# Patient Record
Sex: MALE | Race: WHITE | Employment: OTHER | URBAN - METROPOLITAN AREA
[De-identification: names, ages, dates, MRNs, and addresses within clinical notes are randomized per-mention and may not be internally consistent; named-entity substitution may affect disease eponyms.]

---

## 2018-04-23 ENCOUNTER — OFFICE VISIT (OUTPATIENT)
Dept: GERIATRIC MEDICINE | Age: 78
End: 2018-04-23
Payer: MEDICARE

## 2018-04-23 VITALS
SYSTOLIC BLOOD PRESSURE: 110 MMHG | WEIGHT: 153 LBS | HEART RATE: 68 BPM | TEMPERATURE: 98 F | BODY MASS INDEX: 24.59 KG/M2 | DIASTOLIC BLOOD PRESSURE: 78 MMHG | HEIGHT: 66 IN | RESPIRATION RATE: 16 BRPM

## 2018-04-23 DIAGNOSIS — G31.84 MCI (MILD COGNITIVE IMPAIRMENT): Primary | ICD-10-CM

## 2018-04-23 PROCEDURE — 99212 OFFICE O/P EST SF 10 MIN: CPT | Performed by: INTERNAL MEDICINE

## 2018-04-23 PROCEDURE — G8427 DOCREV CUR MEDS BY ELIG CLIN: HCPCS | Performed by: INTERNAL MEDICINE

## 2018-04-23 PROCEDURE — 1123F ACP DISCUSS/DSCN MKR DOCD: CPT | Performed by: INTERNAL MEDICINE

## 2018-04-23 PROCEDURE — 1036F TOBACCO NON-USER: CPT | Performed by: INTERNAL MEDICINE

## 2018-04-23 PROCEDURE — 4040F PNEUMOC VAC/ADMIN/RCVD: CPT | Performed by: INTERNAL MEDICINE

## 2018-04-23 PROCEDURE — 99211 OFF/OP EST MAY X REQ PHY/QHP: CPT

## 2018-04-23 PROCEDURE — G8598 ASA/ANTIPLAT THER USED: HCPCS | Performed by: INTERNAL MEDICINE

## 2018-04-23 PROCEDURE — G8420 CALC BMI NORM PARAMETERS: HCPCS | Performed by: INTERNAL MEDICINE

## 2018-04-23 RX ORDER — CYANOCOBALAMIN 1000 UG/ML
1000 INJECTION INTRAMUSCULAR; SUBCUTANEOUS
COMMUNITY
End: 2021-01-01

## 2018-04-23 RX ORDER — AMOXICILLIN 500 MG/1
2000 CAPSULE ORAL
Status: ON HOLD | COMMUNITY
End: 2019-04-04 | Stop reason: HOSPADM

## 2018-04-23 RX ORDER — WARFARIN SODIUM 5 MG/1
TABLET ORAL
COMMUNITY
End: 2020-09-09 | Stop reason: SDUPTHER

## 2018-10-15 ENCOUNTER — HOSPITAL ENCOUNTER (OUTPATIENT)
Age: 78
Discharge: HOME | End: 2018-10-17
Payer: MEDICARE

## 2018-10-15 LAB — PROSTATE SPECIFIC ANTIGEN: 5.88 NG/ML (ref 0–4)

## 2018-10-15 PROCEDURE — 84153 ASSAY OF PSA TOTAL: CPT

## 2018-10-23 ENCOUNTER — OFFICE VISIT (OUTPATIENT)
Dept: GERIATRIC MEDICINE | Age: 78
End: 2018-10-23
Payer: MEDICARE

## 2018-10-23 VITALS
WEIGHT: 151.3 LBS | HEART RATE: 72 BPM | SYSTOLIC BLOOD PRESSURE: 132 MMHG | HEIGHT: 66 IN | TEMPERATURE: 98 F | DIASTOLIC BLOOD PRESSURE: 76 MMHG | BODY MASS INDEX: 24.32 KG/M2 | RESPIRATION RATE: 16 BRPM

## 2018-10-23 DIAGNOSIS — R41.0 CONFUSION: Primary | ICD-10-CM

## 2018-10-23 PROCEDURE — G8598 ASA/ANTIPLAT THER USED: HCPCS | Performed by: INTERNAL MEDICINE

## 2018-10-23 PROCEDURE — G8427 DOCREV CUR MEDS BY ELIG CLIN: HCPCS | Performed by: INTERNAL MEDICINE

## 2018-10-23 PROCEDURE — 1036F TOBACCO NON-USER: CPT | Performed by: INTERNAL MEDICINE

## 2018-10-23 PROCEDURE — 99211 OFF/OP EST MAY X REQ PHY/QHP: CPT | Performed by: INTERNAL MEDICINE

## 2018-10-23 PROCEDURE — G8420 CALC BMI NORM PARAMETERS: HCPCS | Performed by: INTERNAL MEDICINE

## 2018-10-23 PROCEDURE — 99212 OFFICE O/P EST SF 10 MIN: CPT | Performed by: INTERNAL MEDICINE

## 2018-10-23 PROCEDURE — 1123F ACP DISCUSS/DSCN MKR DOCD: CPT | Performed by: INTERNAL MEDICINE

## 2018-10-23 PROCEDURE — G8484 FLU IMMUNIZE NO ADMIN: HCPCS | Performed by: INTERNAL MEDICINE

## 2018-10-23 PROCEDURE — 1101F PT FALLS ASSESS-DOCD LE1/YR: CPT | Performed by: INTERNAL MEDICINE

## 2018-10-23 PROCEDURE — 4040F PNEUMOC VAC/ADMIN/RCVD: CPT | Performed by: INTERNAL MEDICINE

## 2018-10-23 RX ORDER — LISINOPRIL 10 MG/1
5 TABLET ORAL DAILY
COMMUNITY
End: 2019-04-01 | Stop reason: ALTCHOICE

## 2018-10-23 ASSESSMENT — PATIENT HEALTH QUESTIONNAIRE - PHQ9
SUM OF ALL RESPONSES TO PHQ QUESTIONS 1-9: 0
2. FEELING DOWN, DEPRESSED OR HOPELESS: 0
1. LITTLE INTEREST OR PLEASURE IN DOING THINGS: 0
SUM OF ALL RESPONSES TO PHQ9 QUESTIONS 1 & 2: 0
SUM OF ALL RESPONSES TO PHQ QUESTIONS 1-9: 0

## 2018-10-23 NOTE — PROGRESS NOTES
Alert and may be doing worse acc to wife   And [place] worse  And NO DRIVING at this time  Reading lips  Wife doing all IADL's  ADL's independent  Sleeps poorly due to nightmares  Watches TV but cannot hear and poor focus   1/3 3 point fix and knew October   President ? ?    Minicog 0/3 and clock 4/4 and animals 12 and cube  As well as pentagons good   No hearing aids and will not use them   But unable to know year or age   More repeating   Social  [facility] [employer]   Impressions : Nightmares from [place]                           Worsening clinically with [facility]                            Question                           Very [place]   Plan: B12 level           Continue Aricept 20 mg and move dosage to AM           And Namenda 20 mg a day

## 2018-10-31 DIAGNOSIS — R41.0 CONFUSION: ICD-10-CM

## 2019-04-01 ENCOUNTER — APPOINTMENT (OUTPATIENT)
Dept: GENERAL RADIOLOGY | Age: 79
DRG: 535 | End: 2019-04-01
Payer: MEDICARE

## 2019-04-01 ENCOUNTER — APPOINTMENT (OUTPATIENT)
Dept: ULTRASOUND IMAGING | Age: 79
DRG: 535 | End: 2019-04-01
Payer: MEDICARE

## 2019-04-01 ENCOUNTER — APPOINTMENT (OUTPATIENT)
Dept: CT IMAGING | Age: 79
DRG: 535 | End: 2019-04-01
Payer: MEDICARE

## 2019-04-01 ENCOUNTER — HOSPITAL ENCOUNTER (INPATIENT)
Age: 79
LOS: 3 days | Discharge: NURSING, REHAB OR LONG-TERM CARE | DRG: 535 | End: 2019-04-04
Attending: EMERGENCY MEDICINE | Admitting: INTERNAL MEDICINE
Payer: MEDICARE

## 2019-04-01 DIAGNOSIS — R09.02 HYPOXIA: Primary | ICD-10-CM

## 2019-04-01 DIAGNOSIS — J96.01 ACUTE RESPIRATORY FAILURE WITH HYPOXIA (HCC): ICD-10-CM

## 2019-04-01 LAB
ALBUMIN SERPL-MCNC: 3.6 G/DL (ref 3.5–5.2)
ALP BLD-CCNC: 67 U/L (ref 40–129)
ALT SERPL-CCNC: 20 U/L (ref 0–40)
ANION GAP SERPL CALCULATED.3IONS-SCNC: 10 MMOL/L (ref 7–16)
AST SERPL-CCNC: 24 U/L (ref 0–39)
BILIRUB SERPL-MCNC: 0.4 MG/DL (ref 0–1.2)
BILIRUBIN URINE: NEGATIVE
BLOOD, URINE: NEGATIVE
BUN BLDV-MCNC: 19 MG/DL (ref 8–23)
CALCIUM SERPL-MCNC: 8.9 MG/DL (ref 8.6–10.2)
CHLORIDE BLD-SCNC: 102 MMOL/L (ref 98–107)
CLARITY: CLEAR
CO2: 27 MMOL/L (ref 22–29)
COLOR: YELLOW
CREAT SERPL-MCNC: 1 MG/DL (ref 0.7–1.2)
GFR AFRICAN AMERICAN: >60
GFR NON-AFRICAN AMERICAN: >60 ML/MIN/1.73
GLUCOSE BLD-MCNC: 145 MG/DL (ref 74–99)
GLUCOSE URINE: NEGATIVE MG/DL
HCT VFR BLD CALC: 39.6 % (ref 37–54)
HEMOGLOBIN: 12.7 G/DL (ref 12.5–16.5)
INR BLD: 2.5
KETONES, URINE: NEGATIVE MG/DL
LACTIC ACID: 2.1 MMOL/L (ref 0.5–2.2)
LEUKOCYTE ESTERASE, URINE: NEGATIVE
MCH RBC QN AUTO: 31.5 PG (ref 26–35)
MCHC RBC AUTO-ENTMCNC: 32.1 % (ref 32–34.5)
MCV RBC AUTO: 98.3 FL (ref 80–99.9)
NITRITE, URINE: NEGATIVE
PDW BLD-RTO: 14.4 FL (ref 11.5–15)
PH UA: 6 (ref 5–9)
PLATELET # BLD: 182 E9/L (ref 130–450)
PMV BLD AUTO: 9.8 FL (ref 7–12)
POTASSIUM SERPL-SCNC: 4.3 MMOL/L (ref 3.5–5)
PRO-BNP: 676 PG/ML (ref 0–450)
PROTEIN UA: NEGATIVE MG/DL
PROTHROMBIN TIME: 28.1 SEC (ref 9.3–12.4)
RBC # BLD: 4.03 E12/L (ref 3.8–5.8)
SODIUM BLD-SCNC: 139 MMOL/L (ref 132–146)
SPECIFIC GRAVITY UA: 1.01 (ref 1–1.03)
TOTAL PROTEIN: 6.3 G/DL (ref 6.4–8.3)
TROPONIN: 0.01 NG/ML (ref 0–0.03)
UROBILINOGEN, URINE: 1 E.U./DL
WBC # BLD: 7.1 E9/L (ref 4.5–11.5)

## 2019-04-01 PROCEDURE — 36415 COLL VENOUS BLD VENIPUNCTURE: CPT

## 2019-04-01 PROCEDURE — 93005 ELECTROCARDIOGRAM TRACING: CPT | Performed by: NURSE PRACTITIONER

## 2019-04-01 PROCEDURE — 2500000003 HC RX 250 WO HCPCS: Performed by: NURSE PRACTITIONER

## 2019-04-01 PROCEDURE — 70450 CT HEAD/BRAIN W/O DYE: CPT

## 2019-04-01 PROCEDURE — 83605 ASSAY OF LACTIC ACID: CPT

## 2019-04-01 PROCEDURE — 71250 CT THORAX DX C-: CPT

## 2019-04-01 PROCEDURE — 6370000000 HC RX 637 (ALT 250 FOR IP): Performed by: NURSE PRACTITIONER

## 2019-04-01 PROCEDURE — 2700000000 HC OXYGEN THERAPY PER DAY

## 2019-04-01 PROCEDURE — 2060000000 HC ICU INTERMEDIATE R&B

## 2019-04-01 PROCEDURE — 84484 ASSAY OF TROPONIN QUANT: CPT

## 2019-04-01 PROCEDURE — 6360000002 HC RX W HCPCS: Performed by: EMERGENCY MEDICINE

## 2019-04-01 PROCEDURE — 94150 VITAL CAPACITY TEST: CPT

## 2019-04-01 PROCEDURE — 71046 X-RAY EXAM CHEST 2 VIEWS: CPT

## 2019-04-01 PROCEDURE — 85610 PROTHROMBIN TIME: CPT

## 2019-04-01 PROCEDURE — 96374 THER/PROPH/DIAG INJ IV PUSH: CPT

## 2019-04-01 PROCEDURE — 73130 X-RAY EXAM OF HAND: CPT

## 2019-04-01 PROCEDURE — 80053 COMPREHEN METABOLIC PANEL: CPT

## 2019-04-01 PROCEDURE — 72074 X-RAY EXAM THORAC SPINE4/>VW: CPT

## 2019-04-01 PROCEDURE — 72125 CT NECK SPINE W/O DYE: CPT

## 2019-04-01 PROCEDURE — 93971 EXTREMITY STUDY: CPT

## 2019-04-01 PROCEDURE — 83880 ASSAY OF NATRIURETIC PEPTIDE: CPT

## 2019-04-01 PROCEDURE — 85027 COMPLETE CBC AUTOMATED: CPT

## 2019-04-01 PROCEDURE — 6370000000 HC RX 637 (ALT 250 FOR IP): Performed by: STUDENT IN AN ORGANIZED HEALTH CARE EDUCATION/TRAINING PROGRAM

## 2019-04-01 PROCEDURE — 71110 X-RAY EXAM RIBS BIL 3 VIEWS: CPT

## 2019-04-01 PROCEDURE — 99285 EMERGENCY DEPT VISIT HI MDM: CPT

## 2019-04-01 PROCEDURE — 81003 URINALYSIS AUTO W/O SCOPE: CPT

## 2019-04-01 RX ORDER — FUROSEMIDE 10 MG/ML
20 INJECTION INTRAMUSCULAR; INTRAVENOUS ONCE
Status: COMPLETED | OUTPATIENT
Start: 2019-04-01 | End: 2019-04-01

## 2019-04-01 RX ORDER — DIAPER,BRIEF,INFANT-TODD,DISP
EACH MISCELLANEOUS ONCE
Status: COMPLETED | OUTPATIENT
Start: 2019-04-01 | End: 2019-04-01

## 2019-04-01 RX ORDER — RANOLAZINE 500 MG/1
500 TABLET, EXTENDED RELEASE ORAL DAILY
COMMUNITY
End: 2020-07-08 | Stop reason: SDUPTHER

## 2019-04-01 RX ORDER — ACETAMINOPHEN 500 MG
1000 TABLET ORAL EVERY 6 HOURS PRN
Status: DISCONTINUED | OUTPATIENT
Start: 2019-04-01 | End: 2019-04-02 | Stop reason: ALTCHOICE

## 2019-04-01 RX ORDER — TAMSULOSIN HYDROCHLORIDE 0.4 MG/1
0.4 CAPSULE ORAL DAILY
COMMUNITY

## 2019-04-01 RX ORDER — SUCRALFATE 1 G/1
1 TABLET ORAL DAILY
COMMUNITY
End: 2019-10-15

## 2019-04-01 RX ORDER — LIDOCAINE HYDROCHLORIDE 10 MG/ML
5 INJECTION, SOLUTION INFILTRATION; PERINEURAL ONCE
Status: COMPLETED | OUTPATIENT
Start: 2019-04-01 | End: 2019-04-01

## 2019-04-01 RX ORDER — FINASTERIDE 5 MG/1
5 TABLET, FILM COATED ORAL
COMMUNITY

## 2019-04-01 RX ORDER — LIDOCAINE 4 G/G
1 PATCH TOPICAL DAILY
Status: DISCONTINUED | OUTPATIENT
Start: 2019-04-01 | End: 2019-04-04 | Stop reason: HOSPADM

## 2019-04-01 RX ADMIN — FUROSEMIDE 20 MG: 10 INJECTION, SOLUTION INTRAMUSCULAR; INTRAVENOUS at 15:35

## 2019-04-01 RX ADMIN — LIDOCAINE HYDROCHLORIDE 5 ML: 10 INJECTION, SOLUTION INFILTRATION; PERINEURAL at 17:30

## 2019-04-01 RX ADMIN — ACETAMINOPHEN 1000 MG: 500 TABLET ORAL at 18:33

## 2019-04-01 RX ADMIN — BACITRACIN ZINC: 500 OINTMENT TOPICAL at 17:22

## 2019-04-01 ASSESSMENT — PAIN DESCRIPTION - DESCRIPTORS
DESCRIPTORS: TINGLING
DESCRIPTORS: ACHING;DISCOMFORT

## 2019-04-01 ASSESSMENT — PAIN DESCRIPTION - PAIN TYPE
TYPE: ACUTE PAIN
TYPE: ACUTE PAIN

## 2019-04-01 ASSESSMENT — PAIN DESCRIPTION - FREQUENCY
FREQUENCY: CONTINUOUS
FREQUENCY: INTERMITTENT

## 2019-04-01 ASSESSMENT — PAIN SCALES - GENERAL
PAINLEVEL_OUTOF10: 3
PAINLEVEL_OUTOF10: 5
PAINLEVEL_OUTOF10: 2
PAINLEVEL_OUTOF10: 2

## 2019-04-01 ASSESSMENT — PAIN DESCRIPTION - LOCATION
LOCATION: GROIN
LOCATION: GROIN

## 2019-04-01 NOTE — CONSULTS
TRAUMA HISTORY & PHYSICAL  Resident   4/1/2019  5:09 PM    PRIMARY SURVEY    CHIEF COMPLAINT:  Chest pain, fall. Patient had a fall Wednesday last week and has been complaining of pain to the front of his chest as well as shortness of breath. He had a second fall at home today from a standing height and is here complaining of increased chest pain and shortness of breath. He Takes warfarin for blood thinners due to his heart history and blood clots.      AIRWAY:   Airway normal  EMS ETT Absent   Noisy respirations Absent   Retractions: Absent   Vomiting/bleeding: Absent     BREATHING:    Midaxillary breath sound left:  Present  Midaxillary breath sound right: Present  Cough sound intensity:  Good  Respiratory rate: 20  FiO2: 2 liters/min via nasal cannula   SPO2: 95%  SMI: 1000 ml    CIRCULATION:   Femerol pulse rate: within normal limits  Femerol pulse intensity: present  Palpebral conjunctiva: Pink      BP      P     SpO2       T      F    Patient Vitals for the past 8 hrs:   BP Temp Temp src Pulse Resp SpO2 Height Weight   04/01/19 1608 130/80 -- -- 100 20 94 % -- --   04/01/19 1536 112/75 97.4 °F (36.3 °C) Oral 99 15 93 % -- --   04/01/19 1214 128/71 -- -- 65 12 96 % -- --   04/01/19 1131 106/68 97.1 °F (36.2 °C) Temporal 70 16 93 % 5' 9\" (1.753 m) 160 lb (72.6 kg)       FAST EXAM: Not performed    Central Nervous System    GCS Initial 15 minutes   Eye  Motor  Verbal 4 - Opens eyes on own  6 - Follows simple motor commands  5 - Alert and oriented 4 - Opens eyes on own  6 - Follows simple motor commands  5 - Alert and oriented     Neuromuscular blockade: No  Pupil size:  Left 4 mm    Right 4 mm  Pupil reaction: Yes  Wiggles fingers: Left Yes Right Yes  Wiggles toes: Left Yes    Right Yes    Hand grasp:   Left normal       Right normal  Plantar flexion: Left normal     Right normal  Loss of consciousness: No:     History Obtained From:  patient, spouse  Private Medical Doctor: Sandhya Villela History:  no    Conditions: CAD, DVT, PE, placement of a vena cava filter, Pacemaker defibrillator     Medications: See MAR    Allergies: Heparin, Tape    Social History:   Tobacco use:  none  Alcohol use:  none  Illicit drug use:  no history of illicit drug use  History of drug use:  Never    Past Surgical History:  CABG, Vena cava filter    Family History: Non-contributory    NSAID use in last 72 hours: no  Taken PCN in past:  unknown    Complaints:     Head: none  Neck: none  Chest: moderate  Back: None  Abdomen: None  Extremities: mild  Comments: Left 5th finger nail missing. Skin tear to the left forearm.        SECONDARY SURVEY  Head/scalp: No soft tissue injuries    Face: No soft tissue injuries    Eyes/ears/nose: EOMI, PEERL, no soft tissue injuries    Pharynx/mouth:  No soft tissue injury, no malocclusion    Neck: No soft tissue injuries   Cervical spine tenderness: none  ROM:  Normal    Chest wall:  CTAB, no crepitus appreciated, no soft tissue injuries     Heart:  RRR    Abdomen: Soft, non-distended, no soft tissue injuries   Tenderness:  none    Pelvis: Stable, no soft tissue injuries, no pain with hip flexion   Tenderness: none    Thoracolumbar spine: No soft tissue injuries, no step-offs  Tenderness:  none    Extremities:   Sensory normal  Motor normal    Distal Pulses  Left arm Normal  Right arm Normal  Left leg Normal  Right leg Normal    Capillary refill   Left arm normal  Right arm normal  Left leg normal   Right leg normal    Procedures in ED:  None    Lacerations sutured by:   Description of repair:     Radiology:  CT chest  CT Chest WO Contrast   Status: Edited Result - FINAL   Order Providers     Authorizing Blayne Kumar MD          Signed by     Signed Date/Time  Phone Pager   Sindy Wilson 4/01/2019 15:47 [phone]    [facility] Radiologists     Read Date Phone Pager   Sindy Wilson Apr 1, 2019 [phone]    Radiation Dose Estimates     No lateral projections of the thoracic spine were   obtained.       FINDINGS:   Diffuse osteopenia diminishes detail by standard radiographs. In the   anterior projection, cardiac surgical changes and a left-sided ICD   device are documented. There is no evidence of apical pneumothorax or   dependent medial hemothorax. An inferior vena cava filter is noted at   the lower margin of the study.       Lateral projections show anterior wedging and loss of height, likely   of some chronicity given the overall kyphotic curvature, at the T3-5   levels, and there is concavity of the inferior endplate of T8.           Impression   Osteopenia, large habitus, and exaggerated kyphotic curvature limited   detail.       On lateral projections, there appears to be loss of height and   anterior wedging, likely of some chronicity given the kyphotic   curvature and uniformity of compression, most evident at the T3-5   levels, and possibly the inferior articular endplate of T8. No   associated paraspinous hematoma or misalignment of bony structures is   noted.                 Patient MRN: [id number]   : 1940   Age: [de-identified] years   Gender: Male   Order Date: 2019 12:00 PM       Exam: XR RIBS BILATERAL (3 VIEWS)       Number of Images: 7 views       Indication:  Pain following injury       Comparison: Two-view upright chest study 2019 at 1248 hours   and CT chest 2017       Findings: Anterior upright chest projection shows symmetrically   expanded lungs which are clear, without evidence of pleural fluid, and   chronically enlarged cardiovascular shadows without evidence of   decompensation. There is a left-sided ICD device and evidence of prior   cardiac surgery. The chest wall is incompletely characterized on the   initial anterior upright image, but is suggestive of lateral left rib   trauma, partially obscured by the pacemaker generator. Degenerative   changes of the spine and diffuse osteopenia are noted. Overlying EKG   leads and oxygen tubing are present. . An inferior vena cava filter is   noted on the rib images including the upper abdomen.       Ribs: Multilevel left-sided healed rib fractures are noted, likely at   the fifth through eighth rib levels, the most evident at the T7 level. There are lateral fourth through sixth right-sided rib fractures, also   apparently of some chronicity. There is superior subluxation of the   right humeral head with numerous suture anchors, and subluxation of   the left humeral head is also an indication of chronic rotator cuff   injury or atrophy. .       Degenerative changes of the spine are seen.           Impression   Multilevel bilateral healed rib fractures are suspected, with   deformity still evident in the lateral fourth through eighth ribs on   the left and fourth through sixth rib fractures on the right. There is   no definite evidence of acute fracture or related complication, such   as pneumothorax or hemothorax. Patient MRN:  [id number]   : 1940   Age: [de-identified] years   Gender: Male   Order Date:  2019 12:00 PM       EXAM: XR HAND LEFT (MIN 3 VIEWS)       NUMBER OF IMAGES:  4 views       INDICATION: Pain following injury, history of recurrent falls        COMPARISON: None       FINDINGS:    There is no evidence of acute fracture or misalignment. Advanced   degenerative osteoarthropathy findings are noted in the first Aia 16   joint, and the MCP joints of the first, second, third, and fifth   digits, and there is joint space narrowing of the interphalangeal   joints. There is prominent chondrocalcinosis of the proximal wrist   joint space and triangular fibrocartilage. There is a degree of   diffuse osteopenia.        No focal soft tissue abnormalities are identified.  Generalized   swelling over the ulnar aspect of the wrist is a chronic finding.           Impression   Extensive changes of degenerative osteoarthropathy and osteopenia,   including chondrocalcinosis.       No evidence of acute fracture or traumatic misalignment. Patient MRN: [id number]   : 1940   Age: [de-identified] years   Gender: Male   Order Date: 2019 12:00 PM   Exam: CT CERVICAL SPINE WO CONTRAST   Number of Images: 639 views   Indication:   C-SPINE TRAUMA, NEXUS/CCR POSITIVE     Comparison: None.       Technique: Sequential axial CT was performed from the level of C1 to   C7 without IV contrast. Multiplanar reformats were obtained.        Radiation Output: CTDIvol 37.92 (mGy); .29 (mGy-cm)       Findings: The study demonstrates there is normal vertebral body   heights and alignment. There is multilevel osteoarthritic changes and   disc disease with spinal canal stenosis seen at all level. Beckey Conquest Posterior   elements are normally visualized. There is osteopenia of the osseous   structure. Paravertebral soft tissues are within normal limits. The   occiput, C1 and C2 alignment is maintained. The craniovertebral   junction is maintained.            Impression       NO ACUTE FRACTURE OR SIGNIFICANT SUBLUXATION IS IDENTIFIED.        Multilevel osteoarthritic changes and disc disease with osteopenia   from C2-C3 to C7-T1. Patient MRN: [id number]   : 1940   Age: [de-identified] years   Gender: Male   Order Date: 2019 12:00 PM   Exam: CT HEAD WO CONTRAST   Number of Images: 152 views   Indication:   HEAD TRAUMA, CLOSED, MILD, GCS >= 13, NO RISK FACTORS,   NEURO EXAM NORMAL     Comparison: None.       Technique: Sequential axial CT of the head was obtained from the base   of the skull to the vertex without IV contrast.         Radiation Output: CTDIvol 60.18 (mGy); DLP 1127.67 (mGy-cm)        Findings: The study demonstrates the fourth ventricle to be midline. The posterior fossa appears to be normal. There is global atrophy with   periventricular ischemic white matter changes. There is no mass, mass   effect or midline shift.  The ventricles, sulci and cisterns are normal   in appearance for patient's age. The gray-white matter differentiation   is preserved throughout. There is no acute intracranial hemorrhage. No   extra-axial fluid collection is identified.        The bony calvarium is intact. The visualized portion of the paranasal   sinuses and the mastoid air cells are clear.  There is no focal   extracranial soft tissue swelling.            Impression       NO ACUTE INTRACRANIAL PROCESS                 Consultations:     Admission/Diagnosis:   [de-identified] y.o. male s/p Fall with Multiple anterior rib fractures    Code Status: Full    Plan of Treatment:  Admit to telemetry  [facility]  [facility]  Monitor [facility]als     Plan discussed with Dr. Abbie Alonzo on 4/1/2019 at 5:09 PM      Manoj Martinez on 4/1/2019 at 5:09 PM

## 2019-04-01 NOTE — ED NOTES
Placed pt on pulse ox. Stats were at 85% RA.  Placed pt on NC 2L sat went up to 95%      Anel Maier RN  04/01/19 [id number]

## 2019-04-01 NOTE — ED PROVIDER NOTES
ED Attending  CC: Myrtle         Department of Emergency Medicine   ED  Provider Note  Admit Date/RoomTime: 4/1/2019 11:30 AM  ED Room: 33/  Chief Complaint   Fall (multiple falls over the last couple days from NH. patient c/o rib pain and groin pain. )    History of Present Illness   Source of history provided by:  Patient and wife. History/Exam Limitations: none. Maranda Laughlin is a [de-identified] y.o. old male who has a past medical history of:   Past Medical History:   Diagnosis Date    Acute myocardial infarction, unspecified site 1995    Myocardial Infarction    Alzheimer's disease     Anemia associated with acute blood loss 4/11/2013    BPH associated with nocturia 4/9/2013    CAD (coronary artery disease), native coronary artery 4/9/2013    DVT, lower extremity, distal ([facility] [address] 4/9/2013    HTN, goal below 130/80 4/9/2013    Hyperlipidemia LDL goal < 70 4/9/2013    Pulmonary emboli ([facility] [address] 4/9/2013    Retroperitoneal hematoma 4/11/2013    Nayana 2008    presents to the emergency department for complaint of fall this morning when he was attempting to get out of the chair and go to the bathroom. Wife is at bedside and states that he fell Wednesday as well (5 days ago) and sustained a skin tear to his left forearm. He also reports that he has been having trouble with his balance. He is on Coumadin for history of PE. Denies striking his head and denies LOC. His complaint at this time is bilateral rib pain, mild thoracic pain, and left 5th digit. Wife reports he has had some ongoing issue with lower extremity edema. Denies fever, chills, shortness of breath, abdominal, nausea, vomiting, diarrhea, headache, numbness, or paresthesias. The patients tetanus status is up to date. Since onset the symptoms have been moderate in degree. His pain is aggraveated by movement and relieved by nothing. He denies any head injury, loss of consciousness, neck pain, abdominal pain or numbness.     ROS    Pertinent positives and negatives are stated within HPI, all other systems reviewed and are negative. Past Surgical History:   Procedure Laterality Date    BACK SURGERY      CARDIAC DEFIBRILLATOR PLACEMENT      CARDIAC SURGERY      COLONOSCOPY  2010    CORONARY ARTERY BYPASS GRAFT      ECHO COMPL W DOP COLOR FLOW  4/8/2013         ECHO COMPL W DOP COLOR FLOW  4/15/2013         ECHO COMPL W DOP COLOR FLOW  12/6/2013         ENDOSCOPY, COLON, DIAGNOSTIC  2006    HERNIA REPAIR      HERNIA REPAIR  1980    PACEMAKER INSERTION      UPPER GASTROINTESTINAL ENDOSCOPY  12/6/13    DR. TO   Social History:  reports that he quit smoking about 39 years ago. His smoking use included cigarettes. He started smoking about 59 years ago. He has a 20.00 pack-year smoking history. He has never used smokeless tobacco. He reports that he does not drink alcohol or use drugs. Family History: family history is not on file. Allergies: Heparin; Tape [adhesive tape]; and Tape [adhesive tape]    Physical Exam   Oxygen Saturation Interpretation: 86% on room air He does NOT use oxygen at home. ED Triage Vitals [04/01/19 1131]   BP Temp Temp Source Pulse Resp SpO2 Height Weight   106/68 97.1 °F (36.2 °C) Temporal 70 16 93 % 5' 9\" (1.753 m) 160 lb (72.6 kg)       Physical Exam  · Constitutional:  Alert, development consistent with age. · HEENT:  NC/NT. Airway patent. · Neck:  No midline or paravertebral tenderness. Normal ROM. Supple. · Chest:  Symmetrical without visible rash or tenderness. · Respiratory:  Lungs Clear to auscultation and breath sounds equal.  · CV:  Regular rate and rhythm, + systolic murmurs. · GI:  Abdomen Soft, nontender, good bowel sounds. large, healed scars. No firm or pulsatile mass. · Pelvis:  Stable, nontender to palpation. · Back:  Mild thoracic tenderness. · Extremities: No tenderness. Bilateral LE edema 2+ pitting. L greater than R  · Integument:  Normal turgor.   Warm, dry, without visible rash, L lateral forearm has a curved 3cm skin tear (occurred from his fall on Wednesday, 5 days ago, per wife). L 5th digit nail is lifted completely to the cuticle bed, scant amount of bleeding noted, L palmar aspect of finger ecchymotic   · Lymphatic: no lymphadenopathy noted  · Neurological:  Oriented x3, GCS 15. Motor functions intact.      Lab / Imaging Results   (All laboratory and radiology results have been personally reviewed by myself)  Labs:  Results for orders placed or performed during the hospital encounter of 04/01/19   CBC   Result Value Ref Range    WBC 7.1 4.5 - 11.5 E9/L    RBC 4.03 3.80 - 5.80 E12/L    Hemoglobin 12.7 12.5 - 16.5 g/dL    Hematocrit 39.6 37.0 - 54.0 %    MCV 98.3 80.0 - 99.9 fL    MCH 31.5 26.0 - 35.0 pg    MCHC 32.1 32.0 - 34.5 %    RDW 14.4 11.5 - 15.0 fL    Platelets 484 275 - 031 E9/L    MPV 9.8 7.0 - 12.0 fL   Comprehensive metabolic panel   Result Value Ref Range    Sodium 139 132 - 146 mmol/L    Potassium 4.3 3.5 - 5.0 mmol/L    Chloride 102 98 - 107 mmol/L    CO2 27 22 - 29 mmol/L    Anion Gap 10 7 - 16 mmol/L    Glucose 145 (H) 74 - 99 mg/dL    BUN 19 8 - 23 mg/dL    CREATININE 1.0 0.7 - 1.2 mg/dL    GFR Non-African American >60 >=60 mL/min/1.73    GFR African American >60     Calcium 8.9 8.6 - 10.2 mg/dL    Total Protein 6.3 (L) 6.4 - 8.3 g/dL    Alb 3.6 3.5 - 5.2 g/dL    Total Bilirubin 0.4 0.0 - 1.2 mg/dL    Alkaline Phosphatase 67 40 - 129 U/L    ALT 20 0 - 40 U/L    AST 24 0 - 39 U/L   Brain Natriuretic Peptide   Result Value Ref Range    Pro- (H) 0 - 450 pg/mL   Troponin   Result Value Ref Range    Troponin 0.01 0.00 - 0.03 ng/mL   Urinalysis   Result Value Ref Range    Color, UA Yellow Straw/Yellow    Clarity, UA Clear Clear    Glucose, Ur Negative Negative mg/dL    Bilirubin Urine Negative Negative    Ketones, Urine Negative Negative mg/dL    Specific Gravity, UA 1.015 1.005 - 1.030    Blood, Urine Negative Negative    pH, UA 6.0 5.0 - 9.0    Protein, UA Negative tolerated the procedure well. Re-examination:  4/1/19     Time: 1445: O2 saturation 86% on room air while he was at rest. Placed back on O2 3L n/c and saturation increased to 95%. Pt does NOT use oxygen at home. Patients symptoms are improving. Dr Alex Chowdhury spoke to Dr Alethea Steele for admission   Dr Alex Chowdhury spoke to trauma for consult    Consult(s):   IP CONSULT TO INTERNAL MEDICINE  IP CONSULT TO TRAUMA SURGERY    Procedure(s):   See above procedure note    MDM:   Steffany Bonilla is a [de-identified] yr old male who presents to the ED for c/o falling when he attempted to get up out of the chair to go to the bathroom. Denies striking his head and denies LOC he is on Coumadin for past history of PE. According to the wife who is at bedside he also fell on Wednesday and has been having some balance issues. At this time he complains of anterior rib pain, back pain, and lifted his left 5th digit nail bed up to cuticle. When he fell Wednesday he sustained a left forearm skin tear. He is alert slightly slow to respond but is oriented ×3. Urinalysis negative for UTI, CBC showed no leukocytosis H&H stable at 12.7 and 39.6. CMP unremarkable. Troponin 0.01, ProBNP 676, Lactic acid 2.1, INR 2.5. Chest x-ray and troponin pulmonary vascular congestion. Patient does not use oxygen at home and was found to have O2 saturation 86-88% on room air. He was placed on 3 L nasal cannula with saturation increasing to 95-96%. X-ray left hand no acute fracture. CT of chest bilateral anterior rib fractures without pneumothorax, multiple compression fractures of the thoracic spine. Dr Alethea Steele accepted admission and Trauma services was consulted. He was given Lasix, left 5th digit nail was removed after digital block, patient tolerated well and no active bleeding. Results discussed with pt and family, they verbalized understanding and agree with plan for admission. Counseling:        The emergency provider has spoken with the patient and family and discussed todays results, in addition to providing specific details for the plan of care and counseling regarding the diagnosis and prognosis. Questions are answered at this time and they are agreeable with the plan. Assessment      1. Hypoxia    2. Acute respiratory failure with hypoxia (HCC)         Admit to telemetry  Patient condition is stable    New Medications     New Prescriptions    No medications on file     Electronically signed by AGUSTIN Salas CNP   DD: 4/1/19  **This report was transcribed using voice recognition software. Every effort was made to ensure accuracy; however, inadvertent computerized transcription errors may be present.   END OF ED PROVIDER NOTE     AGUSTIN Salas CNP  04/04/19 9090

## 2019-04-02 ENCOUNTER — APPOINTMENT (OUTPATIENT)
Dept: GENERAL RADIOLOGY | Age: 79
DRG: 535 | End: 2019-04-02
Payer: MEDICARE

## 2019-04-02 PROBLEM — R06.02 SHORTNESS OF BREATH: Status: ACTIVE | Noted: 2019-04-02

## 2019-04-02 PROBLEM — S22.49XA MULTIPLE RIB FRACTURES: Status: ACTIVE | Noted: 2019-04-02

## 2019-04-02 LAB
ANION GAP SERPL CALCULATED.3IONS-SCNC: 11 MMOL/L (ref 7–16)
BUN BLDV-MCNC: 19 MG/DL (ref 8–23)
CALCIUM SERPL-MCNC: 8.6 MG/DL (ref 8.6–10.2)
CHLORIDE BLD-SCNC: 103 MMOL/L (ref 98–107)
CO2: 28 MMOL/L (ref 22–29)
CREAT SERPL-MCNC: 1 MG/DL (ref 0.7–1.2)
EKG ATRIAL RATE: 67 BPM
EKG P AXIS: 49 DEGREES
EKG Q-T INTERVAL: 502 MS
EKG QRS DURATION: 204 MS
EKG QTC CALCULATION (BAZETT): 530 MS
EKG R AXIS: -57 DEGREES
EKG T AXIS: 115 DEGREES
EKG VENTRICULAR RATE: 67 BPM
GFR AFRICAN AMERICAN: >60
GFR NON-AFRICAN AMERICAN: >60 ML/MIN/1.73
GLUCOSE BLD-MCNC: 123 MG/DL (ref 74–99)
INR BLD: 2.4
POTASSIUM REFLEX MAGNESIUM: 3.9 MMOL/L (ref 3.5–5)
PROTHROMBIN TIME: 27.2 SEC (ref 9.3–12.4)
SODIUM BLD-SCNC: 142 MMOL/L (ref 132–146)

## 2019-04-02 PROCEDURE — 97166 OT EVAL MOD COMPLEX 45 MIN: CPT

## 2019-04-02 PROCEDURE — 6370000000 HC RX 637 (ALT 250 FOR IP): Performed by: INTERNAL MEDICINE

## 2019-04-02 PROCEDURE — 97530 THERAPEUTIC ACTIVITIES: CPT

## 2019-04-02 PROCEDURE — APPSS15 APP SPLIT SHARED TIME 0-15 MINUTES: Performed by: NURSE PRACTITIONER

## 2019-04-02 PROCEDURE — 99232 SBSQ HOSP IP/OBS MODERATE 35: CPT | Performed by: SURGERY

## 2019-04-02 PROCEDURE — 72110 X-RAY EXAM L-2 SPINE 4/>VWS: CPT

## 2019-04-02 PROCEDURE — 2580000003 HC RX 258: Performed by: INTERNAL MEDICINE

## 2019-04-02 PROCEDURE — 6370000000 HC RX 637 (ALT 250 FOR IP): Performed by: STUDENT IN AN ORGANIZED HEALTH CARE EDUCATION/TRAINING PROGRAM

## 2019-04-02 PROCEDURE — 73502 X-RAY EXAM HIP UNI 2-3 VIEWS: CPT

## 2019-04-02 PROCEDURE — 97535 SELF CARE MNGMENT TRAINING: CPT

## 2019-04-02 PROCEDURE — 97162 PT EVAL MOD COMPLEX 30 MIN: CPT

## 2019-04-02 PROCEDURE — 1200000000 HC SEMI PRIVATE

## 2019-04-02 PROCEDURE — 85610 PROTHROMBIN TIME: CPT

## 2019-04-02 PROCEDURE — 80048 BASIC METABOLIC PNL TOTAL CA: CPT

## 2019-04-02 PROCEDURE — 36415 COLL VENOUS BLD VENIPUNCTURE: CPT

## 2019-04-02 PROCEDURE — 2700000000 HC OXYGEN THERAPY PER DAY

## 2019-04-02 RX ORDER — ACETAMINOPHEN 500 MG
500 TABLET ORAL EVERY 4 HOURS PRN
Status: DISCONTINUED | OUTPATIENT
Start: 2019-04-02 | End: 2019-04-04 | Stop reason: HOSPADM

## 2019-04-02 RX ORDER — MEMANTINE HYDROCHLORIDE 10 MG/1
10 TABLET ORAL 2 TIMES DAILY
Status: DISCONTINUED | OUTPATIENT
Start: 2019-04-02 | End: 2019-04-04 | Stop reason: HOSPADM

## 2019-04-02 RX ORDER — PANTOPRAZOLE SODIUM 40 MG/1
40 TABLET, DELAYED RELEASE ORAL
Status: DISCONTINUED | OUTPATIENT
Start: 2019-04-02 | End: 2019-04-04 | Stop reason: HOSPADM

## 2019-04-02 RX ORDER — ONDANSETRON 2 MG/ML
4 INJECTION INTRAMUSCULAR; INTRAVENOUS EVERY 6 HOURS PRN
Status: DISCONTINUED | OUTPATIENT
Start: 2019-04-02 | End: 2019-04-04 | Stop reason: HOSPADM

## 2019-04-02 RX ORDER — SODIUM CHLORIDE 0.9 % (FLUSH) 0.9 %
10 SYRINGE (ML) INJECTION EVERY 12 HOURS SCHEDULED
Status: DISCONTINUED | OUTPATIENT
Start: 2019-04-02 | End: 2019-04-04 | Stop reason: HOSPADM

## 2019-04-02 RX ORDER — SUCRALFATE 1 G/1
1 TABLET ORAL DAILY
Status: DISCONTINUED | OUTPATIENT
Start: 2019-04-02 | End: 2019-04-04 | Stop reason: HOSPADM

## 2019-04-02 RX ORDER — WARFARIN SODIUM 5 MG/1
5 TABLET ORAL DAILY
Status: DISCONTINUED | OUTPATIENT
Start: 2019-04-02 | End: 2019-04-02

## 2019-04-02 RX ORDER — LABETALOL 100 MG/1
100 TABLET, FILM COATED ORAL EVERY 12 HOURS SCHEDULED
Status: DISCONTINUED | OUTPATIENT
Start: 2019-04-02 | End: 2019-04-04 | Stop reason: HOSPADM

## 2019-04-02 RX ORDER — FINASTERIDE 5 MG/1
5 TABLET, FILM COATED ORAL
Status: DISCONTINUED | OUTPATIENT
Start: 2019-04-02 | End: 2019-04-04 | Stop reason: HOSPADM

## 2019-04-02 RX ORDER — NITROGLYCERIN 0.4 MG/1
0.4 TABLET SUBLINGUAL EVERY 5 MIN PRN
Status: DISCONTINUED | OUTPATIENT
Start: 2019-04-02 | End: 2019-04-04 | Stop reason: HOSPADM

## 2019-04-02 RX ORDER — RANOLAZINE 500 MG/1
500 TABLET, EXTENDED RELEASE ORAL DAILY
Status: DISCONTINUED | OUTPATIENT
Start: 2019-04-02 | End: 2019-04-04 | Stop reason: HOSPADM

## 2019-04-02 RX ORDER — DONEPEZIL HYDROCHLORIDE 5 MG/1
20 TABLET, FILM COATED ORAL
Status: DISCONTINUED | OUTPATIENT
Start: 2019-04-02 | End: 2019-04-04 | Stop reason: HOSPADM

## 2019-04-02 RX ORDER — TAMSULOSIN HYDROCHLORIDE 0.4 MG/1
0.4 CAPSULE ORAL DAILY
Status: DISCONTINUED | OUTPATIENT
Start: 2019-04-02 | End: 2019-04-04 | Stop reason: HOSPADM

## 2019-04-02 RX ORDER — ROSUVASTATIN CALCIUM 10 MG/1
10 TABLET, COATED ORAL DAILY
Status: DISCONTINUED | OUTPATIENT
Start: 2019-04-02 | End: 2019-04-04 | Stop reason: HOSPADM

## 2019-04-02 RX ORDER — WARFARIN SODIUM 5 MG/1
5 TABLET ORAL
Status: DISCONTINUED | OUTPATIENT
Start: 2019-04-03 | End: 2019-04-04 | Stop reason: HOSPADM

## 2019-04-02 RX ORDER — AMLODIPINE BESYLATE 5 MG/1
5 TABLET ORAL DAILY
Status: DISCONTINUED | OUTPATIENT
Start: 2019-04-02 | End: 2019-04-04 | Stop reason: HOSPADM

## 2019-04-02 RX ORDER — SODIUM CHLORIDE 0.9 % (FLUSH) 0.9 %
10 SYRINGE (ML) INJECTION PRN
Status: DISCONTINUED | OUTPATIENT
Start: 2019-04-02 | End: 2019-04-04 | Stop reason: HOSPADM

## 2019-04-02 RX ADMIN — SUCRALFATE 1 G: 1 TABLET ORAL at 09:35

## 2019-04-02 RX ADMIN — RANOLAZINE 500 MG: 500 TABLET, FILM COATED, EXTENDED RELEASE ORAL at 09:34

## 2019-04-02 RX ADMIN — MEMANTINE HYDROCHLORIDE 10 MG: 10 TABLET, FILM COATED ORAL at 09:35

## 2019-04-02 RX ADMIN — PANTOPRAZOLE SODIUM 40 MG: 40 TABLET, DELAYED RELEASE ORAL at 05:39

## 2019-04-02 RX ADMIN — MEMANTINE HYDROCHLORIDE 10 MG: 10 TABLET, FILM COATED ORAL at 22:52

## 2019-04-02 RX ADMIN — FINASTERIDE 5 MG: 5 TABLET, FILM COATED ORAL at 09:34

## 2019-04-02 RX ADMIN — Medication 10 ML: at 22:53

## 2019-04-02 RX ADMIN — LABETALOL HYDROCHLORIDE 100 MG: 100 TABLET, FILM COATED ORAL at 09:34

## 2019-04-02 RX ADMIN — ROSUVASTATIN CALCIUM 10 MG: 10 TABLET, FILM COATED ORAL at 09:35

## 2019-04-02 RX ADMIN — DONEPEZIL HYDROCHLORIDE 20 MG: 5 TABLET, FILM COATED ORAL at 09:34

## 2019-04-02 RX ADMIN — AMLODIPINE BESYLATE 5 MG: 5 TABLET ORAL at 09:35

## 2019-04-02 RX ADMIN — TAMSULOSIN HYDROCHLORIDE 0.4 MG: 0.4 CAPSULE ORAL at 09:35

## 2019-04-02 RX ADMIN — LABETALOL HYDROCHLORIDE 100 MG: 100 TABLET, FILM COATED ORAL at 22:52

## 2019-04-02 ASSESSMENT — PAIN SCALES - GENERAL
PAINLEVEL_OUTOF10: 0

## 2019-04-02 NOTE — PROGRESS NOTES
Dr. Arik Lara,    Due to this pts QTc interval being prolonged to 530 ms you may want to consider discontinuing this pts Zofran to prevent any additional risk of further prolongation. If the pt does need an antiemetic you may want to consider Tigan which does not effect the QTc and can be use safely in this pt.    Thanks,  Author Gema, JamariD. 4-2-19 @ 05:25

## 2019-04-02 NOTE — PROGRESS NOTES
Trauma Tertiary Survey    Admit Date: 4/1/2019  Hospital day 1    Fall SH-multiple    CC:  Follow up for bilateral rib fractures. Past Medical History:   Diagnosis Date    Acute myocardial infarction, unspecified site 1995    Myocardial Infarction    Alzheimer's disease     Anemia associated with acute blood loss 4/11/2013    BPH associated with nocturia 4/9/2013    CAD (coronary artery disease), native coronary artery 4/9/2013    DVT, lower extremity, distal ([address] 4/9/2013    HTN, goal below 130/80 4/9/2013    Hyperlipidemia LDL goal < 70 4/9/2013    Pulmonary emboli (HCC) 4/9/2013    Retroperitoneal hematoma 4/11/2013    Shingles 2008     Alcohol pre-screening:  Men: How many times in the past year have you had 5 or more drinks in a day?  none    Scheduled Meds:   amLODIPine  5 mg Oral Daily    donepezil  20 mg Oral Daily with breakfast    finasteride  5 mg Oral Once per day on Mon Wed Fri    labetalol  100 mg Oral 2 times per day    memantine  10 mg Oral BID    pantoprazole  40 mg Oral QAM AC    ranolazine  500 mg Oral Daily    rosuvastatin  10 mg Oral Daily    sucralfate  1 g Oral Daily    tamsulosin  0.4 mg Oral Daily    warfarin  5 mg Oral Daily    sodium chloride flush  10 mL Intravenous 2 times per day    lidocaine  1 patch Transdermal Daily     Continuous Infusions:  PRN Meds:acetaminophen, nitroGLYCERIN, sodium chloride flush, magnesium hydroxide, ondansetron, acetaminophen    Subjective:     Patient has no complaint of chest wal pain or pain anywhere. He denies any numbness or tingling.       Objective:     Patient Vitals for the past 8 hrs:   BP Temp Temp src Pulse Resp SpO2   04/02/19 0500 124/68 97.4 °F (36.3 °C) Oral 78 16 98 %     Past Medical History:   Diagnosis Date    Acute myocardial infarction, unspecified site 1995    Myocardial Infarction    Alzheimer's disease     Anemia associated with acute blood loss 4/11/2013    BPH associated with nocturia 4/9/2013    Recent Labs     04/01/19  1210      K 4.3   CO2 27   BUN 19   CREATININE 1.0     RADIOLOGY  Xr Chest Standard (2 Vw). Result Date: 4/1/2019.  1. Stable, enlarged cardiomediastinal silhouette with nonspecific bibasilar airspace disease which could be reflective of a developing infiltrate/pneumonia and/or atelectasis with small amounts of bilateral pleural fluid. 2. Right hemidiaphragmatic elevation with thoracic aortic vascular calcifications. 3. Central pulmonary vascular congestion. Xr Ribs Bilateral (3 Views). Result Date: 4/1/2019. Multilevel bilateral healed rib fractures are suspected, with deformity still evident in the lateral fourth through eighth ribs on the left and fourth through sixth rib fractures on the right. There is no definite evidence of acute fracture or related complication, such as pneumothorax or hemothorax. Xr Hand Left (min 3 Views). Result Date: 4/1/2019. Extensive changes of degenerative osteoarthropathy and osteopenia, including chondrocalcinosis. No evidence of acute fracture or traumatic misalignment. Ct Head Wo Contrast.  Result Date: 4/1/2019. NO ACUTE INTRACRANIAL PROCESS     Ct Chest Wo Contrast.    Addendum Date: 4/1/2019  3 mm nonspecific pleural-based nodule in the right lung base is noted. Result Date: 4/1/2019. Cardiomegaly with  coronary artery calcification with atelectasis/infiltrates and contusions in the lung bases likely mild CHF. Multiple bilateral anterior rib fractures without pneumothorax. There is also multiple compression fractures of the thoracic spine. Ct Cervical Spine Wo Contrast.  Result Date: 4/1/2019. NO ACUTE FRACTURE OR SIGNIFICANT SUBLUXATION IS IDENTIFIED. Multilevel osteoarthritic changes and disc disease with osteopenia from C2-C3 to C7-T1. Xr Thoracic Spine (min 4 Views). Result Date: 4/1/2019. Osteopenia, large habitus, and exaggerated kyphotic curvature limited detail.  On lateral projections, there appears to be loss of height and anterior wedging, likely of some chronicity given the kyphotic curvature and uniformity of compression, most evident at the T3-5 levels, and possibly the inferior articular endplate of T8. No associated paraspinous hematoma or misalignment of bony structures is noted. Us Dup Lower Extremity Left Frank. Result Date: 4/1/2019. No evidence of deep venous thrombosis in the left lower extremity. INJURIES:   Active Problems:    Acute respiratory failure with hypoxemia (HCC)    Shortness of breath    Multiple rib fractures  Resolved Problems:    * No resolved hospital problems. *    Assessment/Plan:     Neuro:  Multiple falls. Alzheimer's - Monitor neuro status. Aricept. Namenda. CV: No acute issues. Hx of DVT, HTN, pacemaker placement,  hyperlipidemia, & CAD - Monitor hemodynamics. Meds as from home. Statin. Norvasc. Labetalol. NTG prn.    Pulm: Bilateral fractured ribs. Left ribs 4 though 8 fractured. Right ribs 4 though 6 fractured - Room air. Monitor RR & SpO2. Encourage cough, SMI & deep breathing. GI: No acute issues. BMI 24. Diet:  Cardiac. Monitor bowel function. Zofran. Carafate. Renal: No acute issues. Hx of BPH - Flomax. Proscar. ID: No acute issues  Endocrine: No acute issues. MSK: Deconditioned. Multiple falls - ROM. Turn & reposition. PT/OT pending recommendation. Monitor for skin breakdown. Ranexa. Heme: Anemia. Hx of DVT - Monitor CBC. Warfarin. Pharmacy to dose. Bowel regime: Colace. Senna. MOM. Ducolax suppository. Pain control/Sedation:  Lidoderm. Tylenol. DVT prophylaxis: SCDs. On Warfarin. GI: Protonix. Diet. Code status:   Full Code  Patient/Family update:  Questions answered & support given . Disposition:  Admitted to medicine.       Electronically signed by Justino Figueroa RN MSN APRN-NP [facility] NP  [facility] 4/2/2019 11:56 AM

## 2019-04-02 NOTE — PROGRESS NOTES
PCP is Dr. Alonso Marvin. Office notified of admission.       Electronically signed by Rush Johnston RN MSN APRN-NP [facility] NP  [facility] CCR[facility] 4/2/2019 12:05 PM

## 2019-04-02 NOTE — PROGRESS NOTES
walker       Functional Assessment:   Initial Eval Status  Date: 4-2-19 Treatment Status  Date: Short Term Goals  Treatment frequency: PRN 2-4 x/week   Feeding SUP/Set up    Required some assistance to open containers on tray  Mod I   Grooming Min A/Set up    Able to wash hands/face while seated EOB, min A for functional reaching for task of combing hair d/t limited BRIT Gutierrez Shoulders  Min A/Set up  Standing At The Sink   UB Dressing Mod A    Required Mod A to wrap garment around back, thread UEs into garment while seated EOB - limited reaching/ROM  Min A/Set up   LB Dressing Max A - socks  Dep - pants/set up    Required Max A to don/doff gutierrez Socks while seated EOB, demonstrated his own adaptive tech but unable to complete task   Required Mod-Max A of 2   Mod A   Bathing NT      Mod A   Toileting NT      Mod A   Bed Mobility  Rolling:  Min A/Mod VCs  Repositioning: Mod A/Mod VCs   Supine to Sit:  Mod A/Mod VCs    Sit to Supine: Mod A/Mod VCs      Required assist to lift Gutierrez LEs in/out of bed  Min A   Functional Transfers Sit to stand: Mod A of 2  Stand to sit:  Min-Mod A of 2      Pt experienced increase in severity of pain L groin w/ attempt to transfer requiring Mod A of 2 to complete transfer, pt ed for safety/hand placement sit<>stand from EOB 2x  Mod A   Functional Mobility Mod-Max of 2 w/ FWW    1st trial pt was not able to advance either of feet to step fwd or along EOB. After seated rest break, pt able to step ~ 1-2' along EOB, pain increased to 8/10  Mod A   Balance Sitting:  Close SUP     Static:  Good - Close SUP at EOB    Dynamic:  Good (-) - CGA/Close SUP at EOB w/ ADL task    Standing:   Max A of 2 + use of FWW     Static/Dynamic:  Poor - Max A of 2     Activity Tolerance Tolerated Sitting:  EOB ~ 15 mins w/ Close SUP/Ax  Tolerated Standing:  ~ 3 mins 2x     Visual/  Perceptual WFL  Glasses:  None at b/s      Hearing WFL  Hearing Aids  None at b/s       Hand dominance: Right    UE ROM: RUE: Distally WFL, Shoulder flex to ~ 30* AROM to ~ 90* AAROM - arthritic deformities throughout Gutierrez UEs    LUE: Distally WFL, Shoulder flex to ~ 60* AROM to ~ 120* AAROM    Strength: RUE: grossly 3-/5 shoulder flex 4+/5 throughout otherwise - no C/O pain w/ testing     LUE: grossly 4/5 shoulder flex 4+/5 throughout otherwise - no C/O pain w/ testing     Strength:  WFL Gutierrez UEs    Fine Motor Coordination:  WFL Gutierrez UEs    Sensation:  Denies numbness or tingling Gutierrez UEs  Tone:  WFL Gutierrez UEs  Edema:  None Noted                            Treatment:       -- Education:  Provided Pt/Family ed re: Benefits/Purpose of OT services;  OT Plan of Care;  Transfer Safety, Walker safety; Benefits of use of DME/AD/Adaptive equip/techs to increase safety/IND with Functional Ax; Techs to increase Safety/Safety Awareness w/ Functional Ax; Energy Conservation Techs/Pursed-Lip Breathing;  Benefits of Cont'd Participation in OT services at D/C      Pt and/or Family verbalized/demonstrated a good understanding of education provided. Will Review PRN. Provided Skilled SUP/Assist w/ Pt safety, Proper Positioning, ADLs, Transfers and Functional Mobility as noted above, as well as set up and clean up for session. Skilled monitoring of Vitals and pts response to treatment. Consulted PT, RN, Family      [] Malnutrition indicators have been identified and nursing has been notified to ensure a dietitian consult is ordered. Comments/Treatment:  Upon arrival, pt was found semi-supine in bed. He was agreeable to participate in assessment ax. Several family members present during assessment. Received permission from RN prior to engaging pt in assessment ax. At the end of the session, patient was properly positioned in semi-supine with call light and phone within reach, all lines and tubes intact. Oriented pt to call bell. Bed Alarm activated.   Made all appropriate Environmental Modifications to facilitate pt's level of IND and

## 2019-04-02 NOTE — PROGRESS NOTES
Pharmacy Consultation Note  (Warfarin Dosing and Monitoring)    Initial consult date: 4/2/196  Consulting physician: Dr. Ricci Hernández    Allergies:  Heparin; Tape Pioneer Ligas tape]; and Tape Pioneer Ligas tape]    Ht Readings from Last 1 Encounters:   04/01/19 5' 9\" (1.753 m)     Wt Readings from Last 1 Encounters:   04/01/19 160 lb (72.6 kg)         Warfarin Indication Target   INR [place] Dose  (if applicable)   Diet/Feeding Tube   Bilateral Pulmonary Emboli 2-3 Warfarin 5 mg daily except on Tuesdays (patient reported he does not take warfarin on Tuesdays) DIET CARDIAC;       DI Interaction Effect with warfarin Comment                      Vitamin K or Blood product  Administration Date                    TSH:    Lab Results   Component Value Date    TSH 1.495 04/16/2013        Hepatic Function Panel:                            Lab Results   Component Value Date    ALKPHOS 67 04/01/2019    ALT 20 04/01/2019    AST 24 04/01/2019    PROT 6.3 04/01/2019    BILITOT 0.4 04/01/2019    LABALBU 3.6 04/01/2019       Date Warfarin Dose INR Heparin or LMWH HBG/HCT PLT Comment   4/2 -- 2.4 X X X                                          Assessment:  · Naomy Escobar is a [de-identified] y.o. male who presented to the emergency department s/p fall with multiple rib fractures. Patient is currently on warfarin for bilateral pulmonary emboli, which did not need reversed secondary to the aforementioned. Upon admission his INR was therapeutic (2.5) and clinical pharmacy was consulted to dose/monitor warfarin. · Goal INR 2 - 3  · INR today 2.4 (therapeutic)    Plan:  · Will resume patients home dose of warfarin. Patient reported that he is unsure if he took his warfarin yesterday, thus will not further dose tonight. · Daily PT/INR until the INR is stable within the therapeutic range  · Pharmacist will follow and monitor/adjust dosing as necessary    Thank you for this consult. Please page with questions.     Lamberto Hernandez, PharmD-PGY1 4/2/2019 12:42 PM   Pager: [phone]

## 2019-04-02 NOTE — CARE COORDINATION
Cm met with patient and family at bedside to discuss transition of care. Per therapy chris, pt unable to bear weight on leg d/t groin/hip pain. Discussed with Dr Bassam Almaguer, who has ordered imaging of groin and spine. Pt is from [place], where he lives in an apartment with his spouse. Discussed OSMIN placement, and pt and family are agreeable. snf list offered, they choose [facility]. Referral called. Will await Patricia Smith's review.   Will continue to follow

## 2019-04-02 NOTE — H&P
emboli ([facility] [address] 4/9/2013    Retroperitoneal hematoma 4/11/2013    Shingles 2008       Past Surgical History:   Procedure Laterality Date    BACK SURGERY      CARDIAC DEFIBRILLATOR PLACEMENT      CARDIAC SURGERY      COLONOSCOPY  2010    CORONARY ARTERY BYPASS GRAFT      ECHO COMPL W DOP COLOR FLOW  4/8/2013         ECHO COMPL W DOP COLOR FLOW  4/15/2013         ECHO COMPL W DOP COLOR FLOW  12/6/2013         ENDOSCOPY, COLON, DIAGNOSTIC  2006    HERNIA REPAIR      HERNIA REPAIR  1980    PACEMAKER INSERTION      UPPER GASTROINTESTINAL ENDOSCOPY  12/6/13    DR. TO       No family status information on file. Prior to Admission medications    Medication Sig Start Date End Date Taking? Authorizing Provider   ranolazine (RANEXA) 500 MG extended release tablet Take 500 mg by mouth daily   Yes Historical Provider, MD   sucralfate (CARAFATE) 1 GM tablet Take 1 g by mouth daily   Yes Historical Provider, MD   finasteride (PROSCAR) 5 MG tablet Take 5 mg by mouth three times a week On Monday, Wednesday, and Friday   Yes Historical Provider, MD   tamsulosin (FLOMAX) 0.4 MG capsule Take 0.4 mg by mouth daily   Yes Historical Provider, MD   cyanocobalamin 1000 MCG/ML injection Inject 1,000 mcg into the muscle every 30 days   Yes Historical Provider, MD   magnesium hydroxide (MILK OF MAGNESIA) 400 MG/5ML suspension Take by mouth daily as needed for Constipation   Yes Historical Provider, MD   warfarin (COUMADIN) 5 MG tablet Take 5 mg by mouth Six times weekly (Every day except Tuesday)   Yes Historical Provider, MD   acetaminophen (TYLENOL) 500 MG tablet Take 500 mg by mouth as needed for Pain \"Seldom takes\".    Yes Historical Provider, MD   donepezil (ARICEPT) 10 MG tablet Take 20 mg by mouth daily (with breakfast)   Yes Historical Provider, MD   memantine (NAMENDA XR) 28 MG CP24 capsule Take 28 mg by mouth daily   Yes Historical Provider, MD   labetalol (NORMODYNE) 100 MG tablet Take 1 tablet by mouth every None     Forced sexual activity: None   Other Topics Concern    None   Social History Narrative    ** Merged History Encounter **            Allergies   Allergen Reactions    Heparin Other (See Comments)     \"Reaction\" to heparin per wife. Retroperitoneal hematoma.  Tape [place] Tape] Other (See Comments)     Skin tears.  Tape [place] Tape] Rash       The patient's medical records have been reviewed. Review of Systems:  He currently complains of musculoskeletal chest pain but no shortness of breath  · General: Denies malaise or weakness. Denies fever or chills. · Eyes: No visual changes or diplopia. No swelling or pain. · ENT: No Headaches, tinnitus or vertigo. No mouth sores or sore throat. · Cardiovascular: No chest pain, dyspnea on exertion, palpitations, syncope. · Respiratory: No cough or wheezing, hemoptysis, sob, pleuritic pain. · Gastrointestinal: No abdominal pain, anorexia, hematochezia, melena, hematemesis or change in bowels. · Genitourinary: No dysuria, trouble voiding, or hematuria. No change in urination. · Musculoskeletal:  No joint pain or inflammation. No limb weakness. Patient appears to be no acute musculoskeletal chest wall pain  · Integumentary: No rash or pruritis. No abnormal pigmentation,  masses, hair or nail changes  · Neurological: No unusual headaches, weakness, numbness or tingling. No change in gait, balance, coordination, memory, mentation, behavior. His daughter states that he's had unstable gait for a very long time  · Psychiatric: No anxiety, or depression. Mood and affect reported as normal  · Endocrine: No temperature intolerance. No polydipsia or polyuria. · Hematologic: No abnormal bruising or bleeding, blood clots or swollen lymph nodes. no anemia, abnormal bleeding/bruising, fever,chills, night sweats, swollen glands. · Allergic/Immunologic: No nasal congestion or hives. Physical Examination:       Wt Readings from Last 3 Encounters:   04/01/19 160 lb (72.6 kg)   10/23/18 151 lb 4.8 oz (68.6 kg)   04/23/18 153 lb (69.4 kg)     Temp Readings from Last 3 Encounters:   04/02/19 97.4 °F (36.3 °C) (Oral)   10/23/18 98 °F (36.7 °C) (Oral)   04/23/18 98 °F (36.7 °C) (Oral)     BP Readings from Last 3 Encounters:   04/02/19 124/68   10/23/18 132/76   04/23/18 110/78     Pulse Readings from Last 3 Encounters:   04/02/19 78   10/23/18 72   04/23/18 68       General appearance: Normal, awake, alert no distress. Skin: Color, texture, turgor normal. No rashes or lesions. Head: Normocephalic. No masses, lesions, tenderness or abnormalities   Face: Symmetric no visible lesions  Eyes: Conjunctivae/cornea clear. Hessie Hair. Sclera non icteric. Ears: External appearance normal.  Hearing grossly normal  Nose/Sinuses: Nares normal. No paranasal sinus tenderness. Mouth: Lips and tongue appear normal. Dentition noted  Neck:  Symmetric. No adenopathy. Thyroid symmetric, normal size, without nodules. Trachea is midline. Carotids palpable-and assessed. Chest: Even excursion he is tender on the left side of the chest but he is quite capable of taking a good deep inspiration  Lungs: Clear to auscultation. No rhonchi, crackles or rales. Heart: S1 > S2. Regular rate and rhythm. No gallop rub or murmur. Abdomen: Soft, mildly protuberant, non-tender. BS normal. No masses, organomegaly. Anatomic contours appear normal.   Extremities: No deformities, edema, or skin discoloration. Peripheral perfusion assessed in all exremities. No cyanosis  Musculoskeletal: No unusual pain or swelling. Muscular strength intact. Neuro:   · Cranial nerves grossly intact. · Motor: Strength grossly normal. No focal weakness. · Sensory: grossly normal to touch. Mental status: Awake, alert, cognizant and interactive-he is not aware of time and marginally aware of place. Patient appears incapable of directing self care   Mood: Normal and appropriate affect  Gait & balance: not assessed:     Labs     CBC:   Lab Results   Component Value Date    WBC 7.1 04/01/2019    RBC 4.03 04/01/2019    HGB 12.7 04/01/2019    HCT 39.6 04/01/2019     04/01/2019    MCV 98.3 04/01/2019     BMP:    Lab Results   Component Value Date     04/01/2019    K 4.3 04/01/2019     04/01/2019    CO2 27 04/01/2019    BUN 19 04/01/2019    CREATININE 1.0 04/01/2019    GLUCOSE 145 04/01/2019    CALCIUM 8.9 04/01/2019     Hepatic Function Panel:    Lab Results   Component Value Date    ALKPHOS 67 04/01/2019    AST 24 04/01/2019    ALT 20 04/01/2019    PROT 6.3 04/01/2019    LABALBU 3.6 04/01/2019    BILITOT 0.4 04/01/2019     Magnesium:    Lab Results   Component Value Date    MG 1.9 12/10/2013     Cardiac Enzymes:   Lab Results   Component Value Date    CKTOTAL 83 12/04/2013    CKTOTAL 144 04/13/2013    CKTOTAL 186 04/12/2013    CKMB 6.5 12/04/2013    CKMB 1.7 04/13/2013    CKMB 2.4 04/12/2013    TROPONINI 0.01 04/01/2019    TROPONINI 0.03 12/21/2017    TROPONINI 0.03 12/05/2013     LDH:  No results found for: LDH  PT/INR:    Lab Results   Component Value Date    PROTIME 28.1 04/01/2019    INR 2.5 04/01/2019     BNP: No results for input(s): BNP in the last 72 hours.    TSH:   Lab Results   Component Value Date    TSH 1.495 04/16/2013      Cardiac Injury Profile:   Recent Labs     04/01/19  1210   TROPONINI 0.01      Lipid Profile:   Lab Results   Component Value Date    TRIG 79 04/18/2013    HDL 35.0 04/18/2013    LDLCALC 60 04/18/2013    CHOL 111 04/18/2013      Hemoglobin A1C: No components found for: HGBA1C   U/A:   Lab Results   Component Value Date    LEUKOCYTESUR Negative 04/01/2019    PHUR 6.0 04/01/2019    WBCUA NONE 12/04/2013    RBCUA NONE 12/04/2013    BACTERIA NONE 12/04/2013    SPECGRAV 1.015 04/01/2019    BLOODU Negative 04/01/2019    GLUCOSEU Negative 04/01/2019         ADMISSION SCHEDULED MEDS:   Current Facility-Administered Medications Medication Dose Route Frequency Provider Last Rate Last Dose    acetaminophen (TYLENOL) tablet 500 mg  500 mg Oral Q4H PRN Keven Salvador MD        amLODIPine (NORVASC) tablet 5 mg  5 mg Oral Daily Keven Salvador MD        donepezil (ARICEPT) tablet 20 mg  20 mg Oral Daily with breakfast Keven Salvador MD        finasteride (PROSCAR) tablet 5 mg  5 mg Oral Once per day on Mon Wed Fri Keven Salvador MD        labetalol (NORMODYNE) tablet 100 mg  100 mg Oral 2 times per day Keven Salvador MD        memantine [facility]) tablet 10 mg  10 mg Oral BID Keven Salvador MD        nitroGLYCERIN (NITROSTAT) SL tablet 0.4 mg  0.4 mg Sublingual Q5 Min PRN Keven Salvador MD        pantoprazole (PROTONIX) tablet 40 mg  40 mg Oral QAM AC Keven Salvador MD   40 mg at 04/02/19 0539    ranolazine (RANEXA) extended release tablet 500 mg  500 mg Oral Daily Keven Salvador MD        rosuvastatin (CRESTOR) tablet 10 mg  10 mg Oral Daily Keven Salvador MD        sucralfate (CARAFATE) tablet 1 g  1 g Oral Daily Keven Salvador MD        tamsulosin [facility]) capsule 0.4 mg  0.4 mg Oral Daily Keven Salvador MD        warfarin (COUMADIN) tablet 5 mg  5 mg Oral Daily Keven Salvador MD        sodium chloride flush 0.9 % injection 10 mL  10 mL Intravenous 2 times per day Keven Salvador MD        sodium chloride flush 0.9 % injection 10 mL  10 mL Intravenous PRN Keven Salvador MD        magnesium hydroxide (MILK OF MAGNESIA) 400 MG/5ML suspension 30 mL  30 mL Oral Daily PRN Keven Salvador MD        ondansetron [facility]) injection 4 mg  4 mg Intravenous Q6H PRN Keven Salvador MD        lidocaine 4 % external patch 1 patch  1 patch Transdermal Daily Micheline Neal DO   1 patch at 04/01/19 1834    acetaminophen (TYLENOL) tablet 1,000 mg  1,000 mg Oral Q6H PRN Micheline Neal DO   1,000 mg at 04/01/19 1833       Current  Infusions      Prn Meds  acetaminophen, nitroGLYCERIN, sodium chloride flush, magnesium hydroxide, ondansetron, acetaminophen    Radiology Review:  CT Chest WO Contrast   Final Result   Addendum 1 of 1   3 mm nonspecific pleural-based nodule in the right lung base is noted. Final      US DUP LOWER EXTREMITY LEFT JUAN   Final Result      No evidence of deep venous thrombosis in the left lower extremity. XR THORACIC SPINE (MIN 4 VIEWS)   Final Result   Osteopenia, large habitus, and exaggerated kyphotic curvature limited   detail. On lateral projections, there appears to be loss of height and   anterior wedging, likely of some chronicity given the kyphotic   curvature and uniformity of compression, most evident at the T3-5   levels, and possibly the inferior articular endplate of T8. No   associated paraspinous hematoma or misalignment of bony structures is   noted. XR RIBS BILATERAL (3 VIEWS)   Final Result   Multilevel bilateral healed rib fractures are suspected, with   deformity still evident in the lateral fourth through eighth ribs on   the left and fourth through sixth rib fractures on the right. There is   no definite evidence of acute fracture or related complication, such   as pneumothorax or hemothorax. XR HAND LEFT (MIN 3 VIEWS)   Final Result   Extensive changes of degenerative osteoarthropathy and osteopenia,   including chondrocalcinosis. No evidence of acute fracture or traumatic misalignment. XR CHEST STANDARD (2 VW)   Final Result   1. Stable, enlarged cardiomediastinal silhouette with nonspecific   bibasilar airspace disease which could be reflective of a developing   infiltrate/pneumonia and/or atelectasis with small amounts of   bilateral pleural fluid. 2. Right hemidiaphragmatic elevation with thoracic aortic vascular   calcifications. 3. Central pulmonary vascular congestion.       CT Head WO Contrast   Final Result      NO ACUTE INTRACRANIAL PROCESS         CT Cervical Spine WO Contrast   Final Result      NO ACUTE FRACTURE OR SIGNIFICANT SUBLUXATION IS IDENTIFIED. Multilevel osteoarthritic changes and disc disease with osteopenia   from C2-C3 to C7-T1.                 ASSESSMENT:  Fall from a standing position  Thoracic wall contusion    Multiple rib fractures and compression fractures none of which appear to be new  There is no evidence of lung contusion  Is no evidence of congestive heart failure on examination    Patient Active Problem List   Diagnosis    Alzheimer's disease    Coronary artery disease    Hyperlipidemia    Hypertension    Syncope    Abrasion- posterior scalp     Pulmonary embolism, bilateral ([address]    CAD (coronary artery disease), native coronary artery    Dementia arising in the senium and presenium    DVT, lower extremity, distal ([address]    Pulmonary emboli ([address]    Hyperlipidemia with target LDL less than 70    BPH associated with nocturia    HTN, goal below 130/80    Anemia associated with acute blood loss    Abdominal hematoma    Hypertrophic obstructive cardiomyopathy (HCC)    Acute respiratory failure with hypoxemia (HCC)    Shortness of breath    Multiple rib fractures       PLAN:  At this time there appears to be no acute abnormality no evidence of acute rib fracture no evidence of congestive heart failure  He's had long-standing gait instability with refusal to use a walker  He has known progressive vascular dementia  And he likely has proprioceptive deficit of the lower extremities associated with lumbar stenosis    This patient can be evaluated by physical therapy and can transition to skilled for gait competence and strengthening as soon as possible    At some point depending on his progression with a walker may consider stopping anticoagulation with use of a filter      See  Orders  Arturo Dan MD, [place] , American Board of Internal Medicine  Diplomate, American Board of Geriatric Medicine  7:48 AM  4/2/2019

## 2019-04-02 NOTE — PROGRESS NOTES
[facility]   ATTENDING PHYSICIAN PROGRESS NOTE     I have examined the patient, reviewed the record, and discussed the case with the APN/ Resident. I have reviewed all relevant labs and imaging data. Please refer to the APN/ resident's note. I agree with the assessment and plan with the following corrections/ additions. The following summarizes my clinical findings and independent assessment. CC: fall    Patient without complaints. Denies chest wall pain. Awake and alert. Follows commands. Heart: Regular. Lungs: Fairly clear bilaterally. Abdomen: Soft; bowel sounds active; nontender/nondistended.   Skin: Warm/dry    Patient Active Problem List    Diagnosis Date Noted    Anemia associated with acute blood loss 04/11/2013     Priority: High    Abdominal hematoma 04/11/2013     Priority: High    CAD (coronary artery disease), native coronary artery 04/09/2013     Priority: High    Dementia arising in the senium and presenium 04/09/2013     Priority: High    DVT, lower extremity, distal ([address] 04/09/2013     Priority: High    Pulmonary emboli ([address] 04/09/2013     Priority: High    HTN, goal below 130/80 04/09/2013     Priority: High    Hyperlipidemia with target LDL less than 70 04/09/2013     Priority: Medium    BPH associated with nocturia 04/09/2013     Priority: Medium    Shortness of breath 04/02/2019    Multiple rib fractures 04/02/2019    Acute respiratory failure with hypoxemia ([address] 04/01/2019    Hypertrophic obstructive cardiomyopathy ([address] 04/16/2013    Syncope 04/07/2013    Abrasion- posterior scalp  04/07/2013    Pulmonary embolism, bilateral ([address] 04/07/2013    Alzheimer's disease     Coronary artery disease     Hyperlipidemia     Hypertension        Status post falls  Bilateral rib fractures--pain control; IS/cough/deep breathe  Diet as tolerated  PT/OT evals  PCDs/Coumadin    Tamar Baldwin MD, FACS  4/2/2019  1:33 PM      NOTE: This report was transcribed

## 2019-04-02 NOTE — PLAN OF CARE
Problem: Falls - Risk of:  Goal: Will remain free from falls  Description  Will remain free from falls  Outcome: Met This Shift  Goal: Absence of physical injury  Description  Absence of physical injury  Outcome: Met This Shift     Problem: Pain:  Goal: Pain level will decrease  Description  Pain level will decrease  Outcome: Met This Shift  Goal: Control of acute pain  Description  Control of acute pain  Outcome: Met This Shift  Goal: Control of chronic pain  Description  Control of chronic pain  Outcome: Met This Shift

## 2019-04-02 NOTE — PROGRESS NOTES
Physical Therapy    Facility/Department: [facility] MED SURG ONC  Initial Assessment    NAME: Azul Villafana  : 1940  MRN: [id number]    Date of Service: 2019    Evaluating Therapist: Amado Mcguire PT, DPT    Room #: 4356H  DIAGNOSIS: Acute respiratory failure with hypoxemia  PRECAUTIONS: Falls, O2, [place], R 5th digit finger nail trauma, Bilateral rib fractures of undetermined age  S/p fall at home    Social:  Pt lives with his wife in a 1 floor plan independent living apt with no step(s) and no rail(s) to enter. 10 steps with rail to apt level if needed. Elevator access used by patient. Prior to admission pt walked with no AD. Wife reported patient has a Rollator [employer] but does not use it. Patient reported independent with ADLs. Wife reported multiple falls at home. Initial Evaluation  Date: 19 Treatment  Date: NA    Short Term/ Long Term   Goals   Was pt agreeable to Eval/treatment? Yes      Does pt have pain? Patient reported L groin pain as 3/10 at rest, increased to 8-10/10 with mobility. Bed Mobility  Rolling: Mod A  Supine to sit: Mod A  Sit to supine: Max A  Scooting: Min A  Min A   Transfers Sit to stand: Mod A +2  Stand to sit: Mod A  Stand pivot: NT  Min A   Ambulation    2 feet laterally with WW with Mod A +2  >50 feet with [employer] with Min A   Stair negotiation: ascended and descended  NT  4 steps with 1 rail with Min A   AM-PAC Score 9/24       Pt is alert and Oriented x 3  BLE ROM is WFL. BLE strength is grossly RLE hip 3+/5, knee ext 3+/5, knee flx 4+/5, ankle 4/5; LLE hip 2/5, knee ext 3/5, knee flx 4/5, ankle 4/5. Balance: Seated: Supervision; Standing: Mod A with [employer]  Sensation: Denied N/T  Edema: None noted. Endurance: Poor  Bed/Chair alarm: Yes     ASSESSMENT  Pt displays functional ability as noted in the objective portion of this evaluation. Comments/Treatment:  Patient cleared by nurse and agreeable to assessment.  Patient found in semi Carrero's and educated on purpose of PT. Patient on supplemental O2 and SpO2 at 89% at rest and with mobility. Patient assisted to seated EOB and educated on proper breathing technique and able to recover to 93-94%. Patient required assist with trunk righting as he reported increased L groin pain with bed mobility. Patient denied dizziness with positional change. Patient reported increased L groin pain with MMT while seated EOB. Patient assisted to standing and demonstrated flexed hips/knees and again reported increased L groin pain with WB task. Patient encouraged to remain standing and focus on breathing. Patient attempted several times to sit and encouraged to remain standing. Patient assisted with weight shifting to offload LLE to attempt gait but unable to advance feet forward. Patient eventually able to perform very small, shuffled side steps to the [facility] but not farther and assisted to seated. Patient assisted back to supine with call light and tray table in reach. Family in room and observed assessment and educated on findings and concerns with L groin pain. Recommend imaging to rule out trauma to pelvis, spine, or hips and reported this to RN. Patient education  Pt educated on purpose of PT for discharge planning, importance of mobility, safety with mobility, hand placement with transfers, transfers, gait. Patient response to education:   Pt verbalized understanding Pt demonstrated skill Pt requires further education in this area   Yes  Partially with verbal cues and assist Yes      Rehab potential is Good for reaching above PT goals. Pts/ family goals   1. To feel better. Patient and or family understand(s) diagnosis, prognosis, and plan of care. Yes     PLAN  PT care will be provided in accordance with the objectives noted above. Whenever appropriate, clear delegation orders will be provided for nursing staff.   Exercises and functional mobility practice will be used as well as appropriate assistive devices or modalities to obtain goals. Patient and family education will also be administered as needed. Frequency of treatments will be 2-5x/week x 3-5 days.     Time in: 1135  Time out: [address] Lu PT, DPT   License number:  [id number]

## 2019-04-03 ENCOUNTER — APPOINTMENT (OUTPATIENT)
Dept: CT IMAGING | Age: 79
DRG: 535 | End: 2019-04-03
Payer: MEDICARE

## 2019-04-03 PROBLEM — M54.50 ACUTE MIDLINE LOW BACK PAIN WITHOUT SCIATICA: Status: ACTIVE | Noted: 2019-04-03

## 2019-04-03 LAB
INR BLD: 1.8
PROTHROMBIN TIME: 20.8 SEC (ref 9.3–12.4)

## 2019-04-03 PROCEDURE — 6370000000 HC RX 637 (ALT 250 FOR IP): Performed by: INTERNAL MEDICINE

## 2019-04-03 PROCEDURE — 6370000000 HC RX 637 (ALT 250 FOR IP): Performed by: STUDENT IN AN ORGANIZED HEALTH CARE EDUCATION/TRAINING PROGRAM

## 2019-04-03 PROCEDURE — 36415 COLL VENOUS BLD VENIPUNCTURE: CPT

## 2019-04-03 PROCEDURE — 85610 PROTHROMBIN TIME: CPT

## 2019-04-03 PROCEDURE — 72131 CT LUMBAR SPINE W/O DYE: CPT

## 2019-04-03 PROCEDURE — 1200000000 HC SEMI PRIVATE

## 2019-04-03 PROCEDURE — 2580000003 HC RX 258: Performed by: INTERNAL MEDICINE

## 2019-04-03 PROCEDURE — 2700000000 HC OXYGEN THERAPY PER DAY

## 2019-04-03 PROCEDURE — 99222 1ST HOSP IP/OBS MODERATE 55: CPT | Performed by: NEUROLOGICAL SURGERY

## 2019-04-03 RX ADMIN — MEMANTINE HYDROCHLORIDE 10 MG: 10 TABLET, FILM COATED ORAL at 09:17

## 2019-04-03 RX ADMIN — LABETALOL HYDROCHLORIDE 100 MG: 100 TABLET, FILM COATED ORAL at 22:06

## 2019-04-03 RX ADMIN — RANOLAZINE 500 MG: 500 TABLET, FILM COATED, EXTENDED RELEASE ORAL at 09:17

## 2019-04-03 RX ADMIN — AMLODIPINE BESYLATE 5 MG: 5 TABLET ORAL at 09:17

## 2019-04-03 RX ADMIN — Medication 10 ML: at 09:17

## 2019-04-03 RX ADMIN — MAGNESIUM HYDROXIDE 30 ML: 400 SUSPENSION ORAL at 17:19

## 2019-04-03 RX ADMIN — LABETALOL HYDROCHLORIDE 100 MG: 100 TABLET, FILM COATED ORAL at 09:17

## 2019-04-03 RX ADMIN — WARFARIN SODIUM 5 MG: 5 TABLET ORAL at 17:19

## 2019-04-03 RX ADMIN — MEMANTINE HYDROCHLORIDE 10 MG: 10 TABLET, FILM COATED ORAL at 22:06

## 2019-04-03 RX ADMIN — ROSUVASTATIN CALCIUM 10 MG: 10 TABLET, FILM COATED ORAL at 09:17

## 2019-04-03 RX ADMIN — Medication 10 ML: at 22:08

## 2019-04-03 RX ADMIN — PANTOPRAZOLE SODIUM 40 MG: 40 TABLET, DELAYED RELEASE ORAL at 05:58

## 2019-04-03 RX ADMIN — DONEPEZIL HYDROCHLORIDE 20 MG: 5 TABLET, FILM COATED ORAL at 09:30

## 2019-04-03 RX ADMIN — FINASTERIDE 5 MG: 5 TABLET, FILM COATED ORAL at 09:17

## 2019-04-03 RX ADMIN — TAMSULOSIN HYDROCHLORIDE 0.4 MG: 0.4 CAPSULE ORAL at 09:16

## 2019-04-03 RX ADMIN — SUCRALFATE 1 G: 1 TABLET ORAL at 09:17

## 2019-04-03 ASSESSMENT — PAIN SCALES - GENERAL
PAINLEVEL_OUTOF10: 0
PAINLEVEL_OUTOF10: 0

## 2019-04-03 NOTE — DISCHARGE INSTR - COC
Conjugate ([id number]) 02/27/2015    Pneumococcal Polysaccharide ([id number]) 10/29/2012    Pneumococcal Vaccine, unspecified formulation 02/16/2018    Tdap (Boostrix, Adacel) 12/19/2017    Tetanus 02/17/2010       Active Problems:  Patient Active Problem List   Diagnosis Code    Alzheimer's disease G30.9, F02.80    Coronary artery disease I25.10    Hyperlipidemia E78.5    Hypertension I10    Syncope R55    Abrasion- posterior scalp  T14. 8XXA    Pulmonary embolism, bilateral ([address] I26.99    CAD (coronary artery disease), native coronary artery I25.10    Dementia arising in the senium and presenium F03.90    DVT, lower extremity, distal (HCC) I82.4Z9    Pulmonary emboli (HCC) I26.99    Hyperlipidemia with target LDL less than 70 E78.5    BPH associated with nocturia N40.1, R35.1    HTN, goal below 130/80 I10    Anemia associated with acute blood loss D62    Abdominal hematoma S30. 1XXA    Hypertrophic obstructive cardiomyopathy (HCC) I42.1    Acute respiratory failure with hypoxemia (HCC) J96.01    Shortness of breath R06.02    Multiple rib fractures S22.49XA    Hypoxia R09.02    Acute respiratory failure with hypoxia (HCC) J96.01       Isolation/Infection:   Isolation          No Isolation            Nurse Assessment:  Last Vital Signs: /74   Pulse 79   Temp 98.4 °F (36.9 °C) (Temporal)   Resp 16   Ht 5' 9\" (1.753 m)   Wt 160 lb (72.6 kg)   SpO2 94%   BMI 23.63 kg/m²     Last documented pain score (0-10 scale): Pain Level: 0  Last Weight:   Wt Readings from Last 1 Encounters:   04/01/19 160 lb (72.6 kg)     Mental Status:  Alert and oriented with intermittent confusion     IV Access:  no    Nursing Mobility/ADLs:  Walking   needs assist   Transfer  needs 2 assist   Bathing Needs assist   Dressing Needs assist   Toileting Uses urinal  Feeding By mouth  Med Admin  whole  Med Delivery   Whole with water     Wound Care Documentation and Therapy:  Wound 04/01/19 Finger (Comment which one) cleaned applied bacitracin to nali bed and covered with 2x2 gauze and kerlix. (Active)   Dressing/Treatment Open to air 4/2/2019  8:57 PM   Wound Length (cm) 1.2 cm 4/2/2019  1:30 PM   Wound Width (cm) 1 cm 4/2/2019  1:30 PM   Wound Depth (cm) 0.1 cm 4/2/2019  1:30 PM   Wound Surface Area (cm^2) 1.2 cm^2 4/2/2019  1:30 PM   Wound Volume (cm^3) 0.12 cm^3 4/2/2019  1:30 PM   Wound Assessment Red 4/2/2019  8:57 PM   Drainage Amount None 4/2/2019  1:30 PM   Mariann-wound Assessment Other (Comment) 4/2/2019  8:57 PM   Number of days: 1       Wound 04/02/19 Arm Anterior; Left (Active)   Dressing Status Clean;Dry; Intact 4/3/2019  4:55 AM   Dressing/Treatment Other (comment) 4/3/2019  4:55 AM   Wound Length (cm) 3 cm 4/3/2019  4:55 AM   Wound Width (cm) 2.7 cm 4/3/2019  4:55 AM   Wound Depth (cm) 0.1 cm 4/3/2019  4:55 AM   Wound Surface Area (cm^2) 8.1 cm^2 4/3/2019  4:55 AM   Wound Volume (cm^3) 0.81 cm^3 4/3/2019  4:55 AM   Wound Assessment Other (Comment) 4/3/2019  4:55 AM   Drainage Amount Scant 4/3/2019  4:55 AM   Drainage Description Sanguinous 4/3/2019  4:55 AM   Odor None 4/3/2019  4:55 AM   Mariann-wound Assessment Intact 4/3/2019  4:55 AM   Number of days: 0        Elimination:  Continence:   · yes  · Bladder: yes  Urinary Catheter: no  Colostomy/Ileostomy/Ileal Conduit: no       Date of Last BM: 4/3/2019    Intake/Output Summary (Last 24 hours) at 4/3/2019 1029  Last data filed at 4/3/2019 0617  Gross per 24 hour   Intake 740 ml   Output 550 ml   Net 190 ml     I/O last 3 completed shifts:   In: 200 [P.O.:980]  Out: 550 [Urine:550]    Safety Concerns:     weakness    Impairments/Disabilities:          Nutrition Therapy:  Current Nutrition Therapy:   Cardiac diet     Routes of Feeding: by mouth  Liquids: regular   Daily Fluid Restriction: none  Last Modified Barium Swallow with Video (Video Swallowing Test): n/a    Treatments at the Time of Hospital Discharge:   Respiratory Treatments:  Oxygen Therapy:  3 litters Ventilator:   no    Rehab Therapies: pt/ot  Weight Bearing Status/Restrictions: Other Medical Equipment (for information only, NOT a DME order): Other Treatments:     Patient's personal belongings (please select all that are sent with patient):  yes    RN SIGNATURE:  Electronically signed by Henry Jimenez RN on 4/4/19 at 3:26 PM    CASE MANAGEMENT/SOCIAL WORK SECTION    Inpatient Status Date: 4/1/19    Readmission Risk Assessment Score:  Readmission Risk              Risk of Unplanned Readmission:        11           Discharging to Facility/ Agency   · Name: [facility]  · Address: [place]  · Phone:  · Fax:    Dialysis Facility (if applicable)   · Name:  · Address:  · Dialysis Schedule:  · Phone:  · Fax:    / signature: Electronically signed by FAIZA Salazar on 4/3/19 at 10:30 AM    PHYSICIAN SECTION    Prognosis: Good    Condition at Discharge: Stable    Rehab Potential (if transferring to Rehab): Good    Recommended Labs or Other Treatments After Discharge: ***    Physician Certification: I certify the above information and transfer of Mal[place]  is necessary for the continuing treatment of the diagnosis listed and that he requires [facility] for less 30 days.      Update Admission H&P: {CHP DME Changes in LMVTO:[id number]}     He is to continue with Incentive spirometry and oxygen support-then wean oxygen as he improves  He is to start weight bearing therapy for pelvic fractures slowly    PHYSICIAN SIGNATURE:  Syed Washington MD

## 2019-04-03 NOTE — CARE COORDINATION
Patient accepted to [facility], can go tomorrow if medically ready. Admission/criteria reviewed. SW/CM to follow. N-17 started. HENS complete. Ambulette form, facesheet, and envelope in soft chart.   Electronically signed by FAIZA Sesay on 4/3/2019 at 10:28 AM

## 2019-04-03 NOTE — PROGRESS NOTES
Pharmacy Consultation Note  (Warfarin Dosing and Monitoring)    Initial consult date: 4/2/196  Consulting physician: Dr. CAMPOS [facility]    Allergies:  Heparin; Tape Ok [place] tape]; and Tape Ok [place] tape]    Ht Readings from Last 1 Encounters:   04/01/19 5' 9\" (1.753 m)     Wt Readings from Last 1 Encounters:   04/01/19 160 lb (72.6 kg)         Warfarin Indication Target   INR [place] Dose  (if applicable)   Diet/Feeding Tube   Bilateral Pulmonary Emboli 2-3 Warfarin 5 mg daily except on Tuesdays (patient reported he does not take warfarin on Tuesdays) DIET CARDIAC;       DI Interaction Effect with warfarin Comment                      Vitamin K or Blood product  Administration Date                    TSH:    Lab Results   Component Value Date    TSH 1.495 04/16/2013        Hepatic Function Panel:                            Lab Results   Component Value Date    ALKPHOS 67 04/01/2019    ALT 20 04/01/2019    AST 24 04/01/2019    PROT 6.3 04/01/2019    BILITOT 0.4 04/01/2019    LABALBU 3.6 04/01/2019       Date Warfarin Dose INR Heparin or LMWH HBG/HCT PLT Comment   4/2 -- 2.4 X X X    4/3 5 mg 1.8 X X X                                 Assessment:  · Aretha Yan is a [de-identified] y.o. male who presented to the emergency department s/p fall with multiple rib fractures. Patient is currently on warfarin for bilateral pulmonary emboli, which did not need reversed secondary to the aforementioned. Upon admission his INR was therapeutic (2.5) and clinical pharmacy was consulted to dose/monitor warfarin. · Goal INR 2 - 3  · INR today 1.8    Plan:  · Warfarin 5 mg tonight. · Daily PT/INR until the INR is stable within the therapeutic range  · Pharmacist will follow and monitor/adjust dosing as necessary    Thank you for this consult. Please page with questions.     Mariella Rondon, PharmD-PGY1 4/3/2019 12:57 PM   Pager: [phone]

## 2019-04-03 NOTE — CONSULTS
[address]                  [address] [place] [place]  [facility]                                  CONSULTATION    PATIENT NAME: Filipe Lara                 :        1940  MED REC NO:   [id number]                            ROOM:       8402  ACCOUNT NO:   [de-identified]                           ADMIT DATE: 2019  PROVIDER:     MIKE Crowell      CONSULT DATE:  2019    NEUROSURGICAL CONSULTATION    REASON FOR CONSULTATION:  Low back pain after a fall a few days ago with  evidence of L5 pars fracture. HISTORY OF PRESENT ILLNESS:  An [de-identified]year-old gentleman with mild back  pain after a fall a few days ago. He states that the pain is there in  his lower back to right side of the lower back when he starts moving  around or upright. If he lays down, he has no pain. He describes the  pain is aching in character. He denies any radicular pain, numbness or  weakness in the lower extremities. No new bowel or bladder changes. He  did have an x-ray done of the lumbar spine which did show some severe  degenerative changes throughout the entire lumbar spine as well as an L5  pars defect and L5-S1 spondylolisthesis. PAST MEDICAL HISTORY:  Significant for coronary artery disease,  hypertension, hyperlipidemia, history of pulmonary emboli, Alzheimer's,  coronary artery disease, hypertension. ALLERGIES:  To HEPARIN and ADHESIVE TAPE. SOCIAL HISTORY:  No current tobacco or alcohol usage. HOME MEDICATIONS:  Please see the electronic medical record. PRIOR SURGERIES:  Include hernia repair, prior back surgery, cardiac  defibrillator placement, coronary artery bypass graft. REVIEW OF SYSTEMS:  The patient is denying any chest pain or shortness  of breath at the present time. Otherwise, please see the HPI for all  other pertinent positive review of systems.     PHYSICAL EXAMINATION:  GENERAL:  On exam, well-developed, well-nourished male appearing in his  stated age, in no acute distress. He is alert and oriented x3. SKIN:  Warm and dry. RESPIRATORY:  No respiratory distress. ABDOMEN:  No significant abdominal distention. EXTREMITIES:  He has 5/5 strength in the upper and lower extremities. NEUROMUSCULAR:  No obvious focal sensory deficits to light touch. He  does have some pain in palpation of the lower lumbar spine. IMPRESSION:  Pleasant [de-identified]year-old gentleman with some mild low back pain  with activity after a fall a few days. X-rays demonstrate an L5 pars  fracture and L5-S1 spondylolisthesis and diffuse degenerative changes  throughout the entire lumbar spine. PLAN:  Lumbar CT will be ordered. Unfortunately, he cannot have an MRI  due to this pacemaker. The patient may be weightbearing as tolerated  with physical therapy. MIKE CASTILLO    I have interviewed and examined the patient and agree with above. He has back pain and an L5 pars fracture. This is probably chronic. Unable to get an MRI.   We will get a CT scan    Lory Posada      D: 04/03/2019 10:25:15       T: 04/03/2019 10:27:59     [email or web address]  Job#: [id number]     Doc#: [id number]    CC:

## 2019-04-03 NOTE — PROGRESS NOTES
Cassidy Ram MD, [address]                   Patient Name: Oliverio Mai                   Age:  [de-identified] y.o. Gender:   male    CC: Fall followed by shortness of breath    HPI: This patient's history was obtained from the emergency room as well as subsequent interview and examination at bedside. Information was also obtained from the patient's daughter at bedside. This patient has a history of vascular dementia according to his daughter and has been having balance problems for quite a while. He has difficulty with stability when he gets up but refuses to use a walker. He also has loss of sensation in both of his feet because of disc problems and this worsens his ability to ambulate competently. The day of admission he stood up from his chair became unsteady and fell. He had fallen last week as well. This time he has significant pain in his chest after falling with some difficulty breathing. He was brought to the emergency room and found to have multiple rib fractures but no evidence of pneumothorax    He is on anticoagulants because of bilateral pulmonary emboli  He has a past history of syncope as well. This morning based on review of systems he feels quite well except some discomfort with deep breathing.   He is alert and interactive in capable of expressing his needs and symptoms quite well    4/3:  Patient was evaluated by PT-had hip pain on the left  X-ray survey ordered yesterday  Pelvic fracture, pars intra-articularis fracture and possible vertebral fracture  This correlates with his pain  The thoracic pain is likely from radicular component since the rib fractures are old  He feels ok today      Past Medical History:   Diagnosis Date    Acute myocardial infarction, unspecified site 1995    Myocardial Infarction    Alzheimer's disease     Anemia associated with acute blood loss 4/11/2013    BPH associated with nocturia 4/9/2013    CAD (coronary artery disease), native coronary artery 4/9/2013    DVT, lower extremity, distal ([address] 4/9/2013    HTN, goal below 130/80 4/9/2013    Hyperlipidemia LDL goal < 70 4/9/2013    Pulmonary emboli ([address] 4/9/2013    Retroperitoneal hematoma 4/11/2013    Shingles 2008       Past Surgical History:   Procedure Laterality Date    BACK SURGERY      CARDIAC DEFIBRILLATOR PLACEMENT      CARDIAC SURGERY      COLONOSCOPY  2010    CORONARY ARTERY BYPASS GRAFT      ECHO COMPL W DOP COLOR FLOW  4/8/2013         ECHO COMPL W DOP COLOR FLOW  4/15/2013         ECHO COMPL W DOP COLOR FLOW  12/6/2013         ENDOSCOPY, COLON, DIAGNOSTIC  2006    HERNIA REPAIR      HERNIA REPAIR  1980    PACEMAKER INSERTION      UPPER GASTROINTESTINAL ENDOSCOPY  12/6/13    DR. TO       The patient's medical records have been reviewed. Review of Systems:    He currently complains of musculoskeletal chest pain but no shortness of breath  · General: Denies malaise or weakness. Denies fever or chills. · Cardiovascular: No chest pain, dyspnea on exertion, palpitations, syncope. · Respiratory: No cough or wheezing, hemoptysis, sob  · Gastrointestinal: No abdominal pain, anorexia, hematochezia, melena, hematemesis or change in bowels. · Genitourinary: No dysuria, trouble voiding, or hematuria. No change in urination. · Musculoskeletal:  Left hip[ pain with weight bearing-   · Integumentary: No rash or pruritis. No abnormal pigmentation,  masses, hair or nail changes  · Neurological: No unusual headaches, weakness, numbness or tingling. No change in gait, balance, coordination, memory, mentation, behavior. His daughter states that he's had unstable gait for a very long time  · Psychiatric: No anxiety, or depression. Mood and affect reported as normal  · Endocrine: No temperature intolerance.  No polydipsia or polyuria. · Hematologic: No abnormal bruising or bleeding, blood clots or swollen lymph nodes. no anemia, abnormal bleeding/bruising, fever,chills, night sweats, swollen glands. · Allergic/Immunologic: No nasal congestion or hives. Physical Examination:      Wt Readings from Last 3 Encounters:   04/01/19 160 lb (72.6 kg)   10/23/18 151 lb 4.8 oz (68.6 kg)   04/23/18 153 lb (69.4 kg)     Temp Readings from Last 3 Encounters:   04/03/19 98.4 °F (36.9 °C) (Temporal)   10/23/18 98 °F (36.7 °C) (Oral)   04/23/18 98 °F (36.7 °C) (Oral)     BP Readings from Last 3 Encounters:   04/03/19 132/74   10/23/18 132/76   04/23/18 110/78     Pulse Readings from Last 3 Encounters:   04/03/19 79   10/23/18 72   04/23/18 68       General appearance:   Normal, awake, alert no distress. Eyes: Conjunctivae/cornea clear. Hessie Hair. Sclera non icteric. Mouth: Lips and tongue appear normal. Dentition noted  Neck:  Symmetric. No adenopathy. Thyroid symmetric, normal size, without nodules. Trachea is midline. Carotids palpable-and assessed. Chest: Even excursion he is tender on the left side of the chest but he is quite capable of taking a good deep inspiration  Lungs: Clear to auscultation. No rhonchi, crackles or rales. Heart: S1 > S2. Regular rate and rhythm. No gallop rub or murmur. Abdomen: Soft, mildly protuberant, non-tender. BS normal. No masses, organomegaly. Anatomic contours appear normal.   Extremities: No deformities, edema, or skin discoloration. Peripheral perfusion assessed in all exremities. No cyanosis  Musculoskeletal: pain is with deep inspiration-wraps around the chest- and there is pain with weight bearing of the left groin  Neuro:   · Cranial nerves grossly intact. Mental status: Awake, alert, cognizant and interactive-he is not aware of time and marginally aware of place. Patient appears incapable of directing self care   Mood: Normal and appropriate affect  Gait Provider Last Rate Last Dose    acetaminophen (TYLENOL) tablet 500 mg  500 mg Oral Q4H PRN Beverly Shermna MD        amLODIPine (NORVASC) tablet 5 mg  5 mg Oral Daily Beverly Sherman MD   5 mg at 04/03/19 6109    donepezil (ARICEPT) tablet 20 mg  20 mg Oral Daily with breakfast Beverly Sherman MD   20 mg at 04/03/19 0930    finasteride (PROSCAR) tablet 5 mg  5 mg Oral Once per day on Mon Wed Fri Beverly Sherman MD   5 mg at 04/03/19 0794    labetalol (NORMODYNE) tablet 100 mg  100 mg Oral 2 times per day Beverly Sherman MD   100 mg at 04/03/19 3186    memantine (NAMENDA) tablet 10 mg  10 mg Oral BID Beverly Sherman MD   10 mg at 04/03/19 0917    nitroGLYCERIN (NITROSTAT) SL tablet 0.4 mg  0.4 mg Sublingual Q5 Min PRN Beverly Sherman MD        pantoprazole (PROTONIX) tablet 40 mg  40 mg Oral QAM AC Beverly Sherman MD   40 mg at 04/03/19 0558    ranolazine (RANEXA) extended release tablet 500 mg  500 mg Oral Daily Beverly Sherman MD   500 mg at 04/03/19 0222    rosuvastatin (CRESTOR) tablet 10 mg  10 mg Oral Daily Beverly Sherman MD   10 mg at 04/03/19 0489    sucralfate (CARAFATE) tablet 1 g  1 g Oral Daily Beverly Sherman MD   1 g at 04/03/19 0917    tamsulosin (FLOMAX) capsule 0.4 mg  0.4 mg Oral Daily Beverly Sherman MD   0.4 mg at 04/03/19 6135    sodium chloride flush 0.9 % injection 10 mL  10 mL Intravenous 2 times per day Beverly Sherman MD   10 mL at 04/03/19 0917    sodium chloride flush 0.9 % injection 10 mL  10 mL Intravenous PRN Beverly Sherman MD        magnesium hydroxide (MILK OF MAGNESIA) 400 MG/5ML suspension 30 mL  30 mL Oral Daily PRN Beverly Sherman MD        ondansetron [facility]) injection 4 mg  4 mg Intravenous Q6H PRN Beverly Sherman MD        warfarin (COUMADIN) tablet 5 mg  5 mg Oral Once per day on Sun Mon Wed Thu Fri Sat Beverly Sherman MD        lidocaine 4 % external patch 1 patch  1 patch Transdermal Daily Pinky Dakin, DO   1 patch at 04/03/19 0917       Current  Infusions      Prn Meds  acetaminophen, nitroGLYCERIN, sodium chloride flush, magnesium hydroxide, ondansetron    Radiology Review:  XR LUMBAR SPINE (MIN 4 VIEWS)   Final Result   1. Moderately severe facet osteoarthropathy L2-S1, moderately severe   degenerative disc disease L3-S1, suspected underlying bilateral L5   pars interarticularis fracture/defect with grade 1 anterolisthesis of   L5 on S1.   2. No definitive acute fracture is seen. If there is clinical concern   for new acute fracture an MRI would be recommended. 3. Moderate bilateral osteoarthritis hips. 4. Convex left spinal curvature centered about the L3 vertebral body. XR HIP 2-3 VW W PELVIS LEFT   Final Result      Nondisplaced fracture left inferior and possibly superior pubic rami. CT Chest WO Contrast   Final Result   Addendum 1 of 1   3 mm nonspecific pleural-based nodule in the right lung base is noted. Final      US DUP LOWER EXTREMITY LEFT JUAN   Final Result      No evidence of deep venous thrombosis in the left lower extremity. XR THORACIC SPINE (MIN 4 VIEWS)   Final Result   Osteopenia, large habitus, and exaggerated kyphotic curvature limited   detail. On lateral projections, there appears to be loss of height and   anterior wedging, likely of some chronicity given the kyphotic   curvature and uniformity of compression, most evident at the T3-5   levels, and possibly the inferior articular endplate of T8. No   associated paraspinous hematoma or misalignment of bony structures is   noted. XR RIBS BILATERAL (3 VIEWS)   Final Result   Multilevel bilateral healed rib fractures are suspected, with   deformity still evident in the lateral fourth through eighth ribs on   the left and fourth through sixth rib fractures on the right. There is   no definite evidence of acute fracture or related complication, such   as pneumothorax or hemothorax.       XR HAND LEFT (MIN 3 VIEWS)   Final Result   Extensive changes of degenerative osteoarthropathy and osteopenia,   including chondrocalcinosis. No evidence of acute fracture or traumatic misalignment. XR CHEST STANDARD (2 VW)   Final Result   1. Stable, enlarged cardiomediastinal silhouette with nonspecific   bibasilar airspace disease which could be reflective of a developing   infiltrate/pneumonia and/or atelectasis with small amounts of   bilateral pleural fluid. 2. Right hemidiaphragmatic elevation with thoracic aortic vascular   calcifications. 3. Central pulmonary vascular congestion. CT Head WO Contrast   Final Result      NO ACUTE INTRACRANIAL PROCESS         CT Cervical Spine WO Contrast   Final Result      NO ACUTE FRACTURE OR SIGNIFICANT SUBLUXATION IS IDENTIFIED. Multilevel osteoarthritic changes and disc disease with osteopenia   from C2-C3 to C7-T1.           CT LUMBAR SPINE WO CONTRAST    (Results Pending)         ASSESSMENT:  Fall from a standing position  Thoracic wall contusion  Pelvic ramus fracture  Pars interarticularis fracture-age undetermined    Multiple rib fractures and compression fractures none of which appear to be new  There is no evidence of lung contusion  Is no evidence of congestive heart failure on examination    Patient Active Problem List   Diagnosis    Alzheimer's disease    Coronary artery disease    Hyperlipidemia    Hypertension    Syncope    Abrasion- posterior scalp     Pulmonary embolism, bilateral ([address]    CAD (coronary artery disease), native coronary artery    Dementia arising in the senium and presenium    DVT, lower extremity, distal ([address]    Pulmonary emboli ([address]    Hyperlipidemia with target LDL less than 70    BPH associated with nocturia    HTN, goal below 130/80    Anemia associated with acute blood loss    Abdominal hematoma    Hypertrophic obstructive cardiomyopathy ([address]    Acute respiratory failure with hypoxemia (HCC)    Shortness of breath    Multiple rib fractures    Hypoxia    Acute respiratory failure with hypoxia [facility])       PLAN:  Reviewed th above with daughter at bedside  Discussed with Neurosurgery although I believe approach will be conservative  Will assess then transfer to skilled if no intevention    At some point depending on his progression with a walker may consider stopping anticoagulation with use of a filter      See  Orders  Geno Garcia MD, Randa Malcolm, American Board of Internal Medicine  Diplomate, [address] [place] [address] Rd., [address] of Geriatric Medicine  10:09 AM  4/3/2019

## 2019-04-03 NOTE — PLAN OF CARE
Problem: Falls - Risk of:  Goal: Will remain free from falls  Description  Will remain free from falls  4/3/2019 1107 by Nando Patterson RN  Outcome: Met This Shift  4/2/2019 2350 by Lia Hsieh RN  Outcome: Met This Shift  Goal: Absence of physical injury  Description  Absence of physical injury  4/3/2019 1107 by Nando Patterson RN  Outcome: Met This Shift  4/2/2019 2350 by Lia Hsieh RN  Outcome: Met This Shift     Problem: Pain:  Goal: Pain level will decrease  Description  Pain level will decrease  Outcome: Met This Shift  Goal: Control of acute pain  Description  Control of acute pain  4/3/2019 1107 by Nando Patterson RN  Outcome: Met This Shift  4/2/2019 2350 by Lia Hsieh RN  Outcome: Met This Shift  Goal: Control of chronic pain  Description  Control of chronic pain  Outcome: Met This Shift

## 2019-04-03 NOTE — PROGRESS NOTES
OT SESSION ATTEMPT     Date:4/3/2019  Patient Name: Jamee Elizalde  MRN: [id number]  : 1940  [facility]     Attempted OT session this date:    [] unavailable due to other medical staff currently with pt   [] on hold, await MRI/ neurosurgical recommendations. [] on hold per nursing staff secondary to lab / radiology results    [] declined Occupational Therapy  this date due to ___. Benefits of participation in therapy reviewed with pt.    [] off unit   [x] Other: Hold for ortho recommendations     Will reattempt OT eval at a later time.     [place], [place] [id number]

## 2019-04-03 NOTE — PROGRESS NOTES
Pt seen and examined  Dictated  [de-identified]year old male with mild back pain after a fall a few days ago. Lumbar x-rays demonstrate diffuse severe degenerative changed and L5-S1 lithesis due to pars defect at L5  Lumbar CT will be ordered.   Pt cannot have MRI due to pacemaker

## 2019-04-04 ENCOUNTER — APPOINTMENT (OUTPATIENT)
Dept: GENERAL RADIOLOGY | Age: 79
DRG: 535 | End: 2019-04-04
Payer: MEDICARE

## 2019-04-04 VITALS
DIASTOLIC BLOOD PRESSURE: 68 MMHG | BODY MASS INDEX: 23.7 KG/M2 | HEART RATE: 78 BPM | SYSTOLIC BLOOD PRESSURE: 134 MMHG | RESPIRATION RATE: 16 BRPM | TEMPERATURE: 98.4 F | HEIGHT: 69 IN | OXYGEN SATURATION: 95 % | WEIGHT: 160 LBS

## 2019-04-04 LAB
INR BLD: 1.7
PROTHROMBIN TIME: 19 SEC (ref 9.3–12.4)

## 2019-04-04 PROCEDURE — 99232 SBSQ HOSP IP/OBS MODERATE 35: CPT | Performed by: NEUROLOGICAL SURGERY

## 2019-04-04 PROCEDURE — 73562 X-RAY EXAM OF KNEE 3: CPT

## 2019-04-04 PROCEDURE — 2700000000 HC OXYGEN THERAPY PER DAY

## 2019-04-04 PROCEDURE — 2580000003 HC RX 258: Performed by: INTERNAL MEDICINE

## 2019-04-04 PROCEDURE — 36415 COLL VENOUS BLD VENIPUNCTURE: CPT

## 2019-04-04 PROCEDURE — 85610 PROTHROMBIN TIME: CPT

## 2019-04-04 PROCEDURE — 6370000000 HC RX 637 (ALT 250 FOR IP): Performed by: STUDENT IN AN ORGANIZED HEALTH CARE EDUCATION/TRAINING PROGRAM

## 2019-04-04 PROCEDURE — 97530 THERAPEUTIC ACTIVITIES: CPT

## 2019-04-04 PROCEDURE — 6370000000 HC RX 637 (ALT 250 FOR IP): Performed by: INTERNAL MEDICINE

## 2019-04-04 RX ORDER — WARFARIN SODIUM 2.5 MG/1
2.5 TABLET ORAL
Status: COMPLETED | OUTPATIENT
Start: 2019-04-04 | End: 2019-04-04

## 2019-04-04 RX ORDER — LIDOCAINE 4 G/G
1 PATCH TOPICAL DAILY
DISCHARGE
Start: 2019-04-04 | End: 2019-06-12

## 2019-04-04 RX ADMIN — ROSUVASTATIN CALCIUM 10 MG: 10 TABLET, FILM COATED ORAL at 09:10

## 2019-04-04 RX ADMIN — LABETALOL HYDROCHLORIDE 100 MG: 100 TABLET, FILM COATED ORAL at 09:11

## 2019-04-04 RX ADMIN — PANTOPRAZOLE SODIUM 40 MG: 40 TABLET, DELAYED RELEASE ORAL at 06:29

## 2019-04-04 RX ADMIN — TAMSULOSIN HYDROCHLORIDE 0.4 MG: 0.4 CAPSULE ORAL at 09:11

## 2019-04-04 RX ADMIN — AMLODIPINE BESYLATE 5 MG: 5 TABLET ORAL at 09:11

## 2019-04-04 RX ADMIN — SUCRALFATE 1 G: 1 TABLET ORAL at 09:11

## 2019-04-04 RX ADMIN — WARFARIN SODIUM 2.5 MG: 5 TABLET ORAL at 17:59

## 2019-04-04 RX ADMIN — MEMANTINE HYDROCHLORIDE 10 MG: 10 TABLET, FILM COATED ORAL at 09:11

## 2019-04-04 RX ADMIN — RANOLAZINE 500 MG: 500 TABLET, FILM COATED, EXTENDED RELEASE ORAL at 09:11

## 2019-04-04 RX ADMIN — Medication 10 ML: at 09:11

## 2019-04-04 RX ADMIN — DONEPEZIL HYDROCHLORIDE 20 MG: 5 TABLET, FILM COATED ORAL at 09:12

## 2019-04-04 RX ADMIN — WARFARIN SODIUM 5 MG: 5 TABLET ORAL at 17:59

## 2019-04-04 ASSESSMENT — PAIN SCALES - GENERAL
PAINLEVEL_OUTOF10: 0
PAINLEVEL_OUTOF10: 0

## 2019-04-04 NOTE — PROGRESS NOTES
Occupational Therapy  OT SESSION ATTEMPT     Date:2019  Patient Name: Diana Brush  MRN: [id number]  : 1940  Room: [address] [place] [address]     Attempted OT session this date:    [] unavailable due to other medical staff currently with pt   [x] on hold - await xray L knee   [] on hold per nursing staff secondary to lab / radiology results    [] declined treatment  this date due to ____. Benefits of participation in therapy reviewed with pt.    [] off unit   [] Other:     Will reattempt OT at a later time.     CHAI RivasR/L #[id number]

## 2019-04-04 NOTE — CONSULTS
Department of Orthopedic Surgery  Resident Consult Note          Reason for Consult:  L hip pain    HISTORY OF PRESENT ILLNESS:       Patient is a [de-identified] y.o. male who presents with L hip sp multiple falls. Hx taken from pt and family in room. Pt has fallen multiple times in the past week has new-onset left hip pain. Days ago, he was unable to ambulate. However, he is now able to weight-bear with minimal ambulation. X-rays were taken demonstrating superior and inferior pubic rami fractures. Denies numbness/tingling/paresthesias. Denies any other orthopedic complaints at this time. Past Medical History:        Diagnosis Date    Acute myocardial infarction, unspecified site 1995    Myocardial Infarction    Alzheimer's disease     Anemia associated with acute blood loss 4/11/2013    BPH associated with nocturia 4/9/2013    CAD (coronary artery disease), native coronary artery 4/9/2013    DVT, lower extremity, distal ([facility] [address] 4/9/2013    HTN, goal below 130/80 4/9/2013    Hyperlipidemia LDL goal < 70 4/9/2013    Pulmonary emboli ([facility] [address] 4/9/2013    Retroperitoneal hematoma 4/11/2013    Shingles 2008     Past Surgical History:        Procedure Laterality Date    BACK SURGERY      CARDIAC DEFIBRILLATOR PLACEMENT      CARDIAC SURGERY      COLONOSCOPY  2010    CORONARY ARTERY BYPASS GRAFT      ECHO COMPL W DOP COLOR FLOW  4/8/2013         ECHO COMPL W DOP COLOR FLOW  4/15/2013         ECHO COMPL W DOP COLOR FLOW  12/6/2013         ENDOSCOPY, COLON, DIAGNOSTIC  2006    HERNIA REPAIR      HERNIA REPAIR  1980    PACEMAKER INSERTION      UPPER GASTROINTESTINAL ENDOSCOPY  12/6/13    DR. TO     Current Medications:   Current Facility-Administered Medications: warfarin (COUMADIN) tablet 2.5 mg, 2.5 mg, Oral, Once  acetaminophen (TYLENOL) tablet 500 mg, 500 mg, Oral, Q4H PRN  amLODIPine (NORVASC) tablet 5 mg, 5 mg, Oral, Daily  donepezil (ARICEPT) tablet 20 mg, 20 mg, Oral, Daily with breakfast  finasteride (PROSCAR) tablet 5 mg, 5 mg, Oral, Once per day on Mon Wed Fri  labetalol (NORMODYNE) tablet 100 mg, 100 mg, Oral, 2 times per day  memantine (NAMENDA) tablet 10 mg, 10 mg, Oral, BID  nitroGLYCERIN (NITROSTAT) SL tablet 0.4 mg, 0.4 mg, Sublingual, Q5 Min PRN  pantoprazole (PROTONIX) tablet 40 mg, 40 mg, Oral, QAM AC  ranolazine (RANEXA) extended release tablet 500 mg, 500 mg, Oral, Daily  rosuvastatin (CRESTOR) tablet 10 mg, 10 mg, Oral, Daily  sucralfate (CARAFATE) tablet 1 g, 1 g, Oral, Daily  tamsulosin (FLOMAX) capsule 0.4 mg, 0.4 mg, Oral, Daily  sodium chloride flush 0.9 % injection 10 mL, 10 mL, Intravenous, 2 times per day  sodium chloride flush 0.9 % injection 10 mL, 10 mL, Intravenous, PRN  magnesium hydroxide (MILK OF MAGNESIA) 400 MG/5ML suspension 30 mL, 30 mL, Oral, Daily PRN  ondansetron (ZOFRAN) injection 4 mg, 4 mg, Intravenous, Q6H PRN  warfarin (COUMADIN) tablet 5 mg, 5 mg, Oral, Once per day on Sun Mon Wed Thu Fri Sat  lidocaine 4 % external patch 1 patch, 1 patch, Transdermal, Daily  Allergies:  Heparin; Tape [adhesive tape]; and Tape [adhesive tape]    Social History:   TOBACCO:   reports that he quit smoking about 39 years ago. His smoking use included cigarettes. He started smoking about 59 years ago. He has a 20.00 pack-year smoking history. He has never used smokeless tobacco.  ETOH:   reports that he does not drink alcohol. DRUGS:   reports that he does not use drugs. ACTIVITIES OF DAILY LIVING:    OCCUPATION:    Family History:   History reviewed. No pertinent family history.     REVIEW OF SYSTEMS:  CONSTITUTIONAL:  negative for  fevers, chills  EYES:  negative for blurred vision, visual disturbance  HEENT:  negative for  hearing loss, voice change  RESPIRATORY:  negative for  dyspnea, wheezing  CARDIOVASCULAR:  negative for  chest pain, palpitations  GASTROINTESTINAL:  negative for nausea, vomiting  GENITOURINARY:  negative for frequency, urinary incontinence  HEMATOLOGIC/LYMPHATIC:  negative for bleeding and petechiae  MUSCULOSKELETAL:  positive for  Pain left hip  NEUROLOGICAL:  negative for headaches, dizziness  BEHAVIOR/PSYCH:  negative for increased agitation and anxiety    PHYSICAL EXAM:    VITALS:  /70   Pulse 62   Temp 98 °F (36.7 °C) (Temporal)   Resp 16   Ht 5' 9\" (1.753 m)   Wt 160 lb (72.6 kg)   SpO2 97%   BMI 23.63 kg/m²   CONSTITUTIONAL:  awake, alert, cooperative, no apparent distress, and appears stated age  MUSCULOSKELETAL:  Left lower Extremity:  Skin is intact circumferentially  +TTP of the anterior pelvis. Compartments soft and compressible  Knee effusion localized to the prepatellar space. No signs of ecchymosis, warmth  Palpable dorsalis pedis and posterior tibialis pulse, brisk cap refill to toes, foot warm and perfused  Sensation intact to light touch in sural/deep peroneal/superficial peroneal/saphenous/posterior tibial nerve distributions to foot/ankle  Demonstrates active ankle plantar/dorsiflexion/great toe extension    Secondary Exam:   · bilateralUE: No obvious signs of trauma. -TTP to fingers, hand, wrist, forearm, elbow, humerus, shoulder or clavicle. · leftLE: No obvious signs of trauma. -TTP to foot, ankle, leg, knee, thigh, hip.-- Patient able to flex/extend toes, ankle, knee and hip with active and passive ROM without pain,+2/4 DP & PT pulses, cap refill <3sec, +5/5 PF/DF/EHL, distal sensation grossly intact to L4-S1 dermatomes, compartments soft and compressible.     · Pelvis: -TTP, -Log roll, -Heel strike     DATA:    CBC:   Lab Results   Component Value Date    WBC 7.1 04/01/2019    RBC 4.03 04/01/2019    HGB 12.7 04/01/2019    HCT 39.6 04/01/2019    MCV 98.3 04/01/2019    MCH 31.5 04/01/2019    MCHC 32.1 04/01/2019    RDW 14.4 04/01/2019     04/01/2019    MPV 9.8 04/01/2019     PT/INR:    Lab Results   Component Value Date    PROTIME 19.0 04/04/2019    INR 1.7 04/04/2019       Radiology Review:  XR Pelvis: Nondisplaced fractures of the L superior and inferior pubic rami.   No other fractures or dislocations noted    IMPRESSION:  · Nondisplaced LC1 pelvic fracture  · L knee non-septic prepatellar bursitis    PLAN:  XR L knee  WBAT - BLE  No orthopedic intervention at this time  Pain Control per primary  PT/OT per primary  OK to DC from orthopedic standpoint, signing off  Have patient follow up with Dr. William Rosado in office, call for appointment  Discussed with Dr. Wliliam Rosado  ·

## 2019-04-04 NOTE — PLAN OF CARE
Problem: Falls - Risk of:  Goal: Will remain free from falls  Description  Will remain free from falls  Outcome: Met This Shift     Problem: Falls - Risk of:  Goal: Absence of physical injury  Description  Absence of physical injury  Outcome: Met This Shift

## 2019-04-04 NOTE — DISCHARGE SUMMARY
Discharge Summary    Sintia Tsai  :  1940  MRN:  [id number]    Admit date:  2019  Discharge date:  2019 7:48 AM    Admitting Physician:  Fariha Silva MD    Discharge Diagnoses:    · Fall   · Pars interarticularis fracture  · Pelvic fracture  · Thoracic compression fractures seem remote  · Multiple rib fractures from falls  · Respiratory insufficiency due to hypoventilation from rib fractures  Patient Active Problem List    Diagnosis Date Noted    Anemia associated with acute blood loss 2013     Priority: High    Abdominal hematoma 2013     Priority: High    CAD (coronary artery disease), native coronary artery 2013     Priority: High    Dementia arising in the senium and presenium 2013     Priority: High    DVT, lower extremity, distal ([address] 2013     Priority: High    Pulmonary emboli ([address] 2013     Priority: High    HTN, goal below 130/80 2013     Priority: High    Hyperlipidemia with target LDL less than 70 2013     Priority: Medium    BPH associated with nocturia 2013     Priority: Medium    Acute midline low back pain without sciatica 2019    Shortness of breath 2019    Multiple rib fractures 2019    Hypoxia     Acute respiratory failure with hypoxia (HCC)     Acute respiratory failure with hypoxemia ([address] 2019    Hypertrophic obstructive cardiomyopathy ([address] 2013    Syncope 2013    Abrasion- posterior scalp  2013    Pulmonary embolism, bilateral ([address] 2013    Alzheimer's disease     Coronary artery disease     Hyperlipidemia     Hypertension        Past Medical Hx :   Past Medical History:   Diagnosis Date    Acute myocardial infarction, unspecified site 1995    Myocardial Infarction    Alzheimer's disease     Anemia associated with acute blood loss 2013    BPH associated with nocturia 2013    CAD (coronary artery disease), native coronary artery stable, enlarged cardiomediastinal silhouette with stable appearance of a multilead cardiac AICD device with right hemidiaphragmatic elevation, nonspecific bibasilar airspace disease, thoracic aortic vascular calcifications with median sternotomy wires, surgical anchors overlying the right humeral head and central pulmonary vascular congestion. No pneumothorax. No acute displaced bony fracture on this limited fracture evaluation. 1. Stable, enlarged cardiomediastinal silhouette with nonspecific bibasilar airspace disease which could be reflective of a developing infiltrate/pneumonia and/or atelectasis with small amounts of bilateral pleural fluid. 2. Right hemidiaphragmatic elevation with thoracic aortic vascular calcifications. 3. Central pulmonary vascular congestion. Xr Ribs Bilateral (3 Views)    Result Date: 2019  Patient MRN: [id number] : 1940 Age:  [de-identified] years Gender: Male Order Date: 2019 12:00 PM Exam: XR RIBS BILATERAL (3 VIEWS) Number of Images: 7 views Indication:  Pain following injury Comparison: Two-view upright chest study 2019 at 1248 hours and CT chest 2017 Findings: Anterior upright chest projection shows symmetrically expanded lungs which are clear, without evidence of pleural fluid, and chronically enlarged cardiovascular shadows without evidence of decompensation. There is a left-sided ICD device and evidence of prior cardiac surgery. The chest wall is incompletely characterized on the initial anterior upright image, but is suggestive of lateral left rib trauma, partially obscured by the pacemaker generator. Degenerative changes of the spine and diffuse osteopenia are noted. Overlying EKG leads and oxygen tubing are present. . An inferior vena cava filter is noted on the rib images including the upper abdomen. Ribs: Multilevel left-sided healed rib fractures are noted, likely at the fifth through eighth rib levels, the most evident at the T7 level.  There are lateral fourth through sixth right-sided rib fractures, also apparently of some chronicity. There is superior subluxation of the right humeral head with numerous suture anchors, and subluxation of the left humeral head is also an indication of chronic rotator cuff injury or atrophy. . Degenerative changes of the spine are seen. Multilevel bilateral healed rib fractures are suspected, with deformity still evident in the lateral fourth through eighth ribs on the left and fourth through sixth rib fractures on the right. There is no definite evidence of acute fracture or related complication, such as pneumothorax or hemothorax. Xr Lumbar Spine (min 4 Views)    Result Date: 2019  Patient MRN: [id number] : 1940 Age:  [de-identified] years Gender: Male Order Date: 2019 1:00 PM. This study was available for interpretation at approximately 1528 hours. Exam: XR LUMBAR SPINE (MIN 4 VIEWS) Number of Views: 6 Indication:  Fall with back pain Comparison: 2017 radiographs. Findings: Convex left spinal curvature centered about the L3 vertebral body. Moderate degenerative changes bilateral hips. Nonobstructive bowel gas pattern. Inferior vena cava filter noted. Calcific density in the right midabdomen measured at approximately 0.43 cm. Moderately severe degenerative disc disease L3-S1 with grade 1 anterolisthesis at L5-S1 and suspected underlying bilateral L5 pars interarticularis fracture/defect. Moderately severe facet osteoarthropathy L2-S1. Vertebral body heights appear to be relatively stable when compared with the previous exam.     1. Moderately severe facet osteoarthropathy L2-S1, moderately severe degenerative disc disease L3-S1, suspected underlying bilateral L5 pars interarticularis fracture/defect with grade 1 anterolisthesis of L5 on S1. 2. No definitive acute fracture is seen. If there is clinical concern for new acute fracture an MRI would be recommended. 3. Moderate bilateral osteoarthritis hips.  4. Convex left spinal curvature centered about the L3 vertebral body. Xr Hand Left (min 3 Views)    Result Date: 2019  Patient MRN:  [id number] : 1940 Age: [de-identified] years Gender: Male Order Date:  2019 12:00 PM EXAM: XR HAND LEFT (MIN 3 VIEWS) NUMBER OF IMAGES:  4 views INDICATION: Pain following injury, history of recurrent falls COMPARISON: None FINDINGS: There is no evidence of acute fracture or misalignment. Advanced degenerative osteoarthropathy findings are noted in the first [facility] joint, and the MCP joints of the first, second, third, and fifth digits, and there is joint space narrowing of the interphalangeal joints. There is prominent chondrocalcinosis of the proximal wrist joint space and triangular fibrocartilage. There is a degree of diffuse osteopenia. No focal soft tissue abnormalities are identified. Generalized swelling over the ulnar aspect of the wrist is a chronic finding. Extensive changes of degenerative osteoarthropathy and osteopenia, including chondrocalcinosis. No evidence of acute fracture or traumatic misalignment. Ct Head Wo Contrast    Result Date: 2019  Patient MRN: [id number] : 1940 Age:  [de-identified] years Gender: Male Order Date: 2019 12:00 PM Exam: CT HEAD WO CONTRAST Number of Images: 152 views Indication:   HEAD TRAUMA, CLOSED, MILD, GCS >= 13, NO RISK FACTORS, NEURO EXAM NORMAL  Comparison: None. Technique: Sequential axial CT of the head was obtained from the base of the skull to the vertex without IV contrast.  Radiation Output: CTDIvol 60.18 (mGy); DLP 1127.67 (mGy-cm) Findings: The study demonstrates the fourth ventricle to be midline. The posterior fossa appears to be normal. There is global atrophy with periventricular ischemic white matter changes. There is no mass, mass effect or midline shift. The ventricles, sulci and cisterns are normal in appearance for patient's age. The gray-white matter differentiation is preserved throughout.  There is no acute CERVICAL SPINE WO CONTRAST Number of Images: 340 views Indication:   C-SPINE TRAUMA, NEXUS/CCR POSITIVE  Comparison: None. Technique: Sequential axial CT was performed from the level of C1 to C7 without IV contrast. Multiplanar reformats were obtained. Radiation Output: CTDIvol 37.92 (mGy); .29 (mGy-cm) Findings: The study demonstrates there is normal vertebral body heights and alignment. There is multilevel osteoarthritic changes and disc disease with spinal canal stenosis seen at all level. Trejo Challenger Posterior elements are normally visualized. There is osteopenia of the osseous structure. Paravertebral soft tissues are within normal limits. The occiput, C1 and C2 alignment is maintained. The craniovertebral junction is maintained. NO ACUTE FRACTURE OR SIGNIFICANT SUBLUXATION IS IDENTIFIED. Multilevel osteoarthritic changes and disc disease with osteopenia from C2-C3 to C7-T1. Xr Thoracic Spine (min 4 Views)    Result Date: 2019  Patient MRN:  [id number] : 1940 Age: [de-identified] years Gender: Male Order Date:  2019 12:00 PM EXAM: XR THORACIC SPINE (MIN 4 VIEWS) NUMBER OF IMAGES:  4 views INDICATION: Multiple recent falls, back pain COMPARISON: None Technique: Anterior and lateral projections of the thoracic spine were obtained. FINDINGS: Diffuse osteopenia diminishes detail by standard radiographs. In the anterior projection, cardiac surgical changes and a left-sided ICD device are documented. There is no evidence of apical pneumothorax or dependent medial hemothorax. An inferior vena cava filter is noted at the lower margin of the study. Lateral projections show anterior wedging and loss of height, likely of some chronicity given the overall kyphotic curvature, at the T3-5 levels, and there is concavity of the inferior endplate of T8. Osteopenia, large habitus, and exaggerated kyphotic curvature limited detail.  On lateral projections, there appears to be loss of height and anterior wedging, likely of some chronicity given the kyphotic curvature and uniformity of compression, most evident at the T3-5 levels, and possibly the inferior articular endplate of T8. No associated paraspinous hematoma or misalignment of bony structures is noted. Us Dup Lower Extremity Left Juan    Result Date: 2019  Patient MRN:  [id number] : 1940 Age: [de-identified] years Gender: Male Order Date:  2019 12:30 PM Exam: US DUP LOWER EXTREMITY LEFT JUAN Number of images:  26 Indication: Swelling. Rule out DVT. Comparison: None. Technique:  Gray-scale, color Doppler, and spectral Doppler ultrasonography of the deep veins was performed by the sonographic technologist. Selected images were submitted for radiologic interpretation. FINDINGS: The submitted images demonstrate compressibility, vascular flow, and augmentation of flow in the deep veins of the left lower extremity from the common femoral vein to the popliteal vein. Vascular flow is demonstrated on Doppler sonography in the deep calf veins. No evidence of deep venous thrombosis in the left lower extremity. Xr Hip 2-3 Vw W Pelvis Left    Result Date: 2019  3 views of the pelvis and left hip are obtained. COMPARISON: April 10, 2013. 2013 FINDINGS: These images reveal slight cortical buckling and lucency through the inferior left pubic ramus. This may propagate to the superior ramus but I cannot entirely confirm that at this time. No other evidence to suggest acute fracture or dislocation. There is osteophytosis and eburnation of the sacroiliac and acetabular articulating surfaces. There is mild diffuse bone demineralization. Degenerative spondylosis and rotoscoliosis with facet arthropathy within the lower lumbar spine. Total calcifications are present. Nondisplaced fracture left inferior and possibly superior pubic rami.        Hospital Course:         Discharge Medications:    Yesenia Devlin, [facility] Medication Instructions OAR:[id number]    Printed on:04/04/19 0748   Medication Information                      acetaminophen (TYLENOL) 500 MG tablet  Take 500 mg by mouth as needed for Pain \"Seldom takes\". amLODIPine (NORVASC) 5 MG tablet  Take 1 tablet by mouth daily. Cholecalciferol (VITAMIN D3) 2000 UNITS CAPS  Take 2,000 Units by mouth daily. cyanocobalamin 1000 MCG/ML injection  Inject 1,000 mcg into the muscle every 30 days             donepezil (ARICEPT) 10 MG tablet  Take 20 mg by mouth daily (with breakfast)             finasteride (PROSCAR) 5 MG tablet  Take 5 mg by mouth three times a week On Monday, Wednesday, and Friday             labetalol (NORMODYNE) 100 MG tablet  Take 1 tablet by mouth every 12 hours. lidocaine 4 % external patch  Place 1 patch onto the skin daily             magnesium hydroxide (MILK OF MAGNESIA) 400 MG/5ML suspension  Take by mouth daily as needed for Constipation             memantine (NAMENDA XR) 28 MG CP24 capsule  Take 28 mg by mouth daily             nitroGLYCERIN (NITROSTAT) 0.4 MG SL tablet  Place 0.4 mg under the tongue every 5 minutes as needed. omeprazole (PRILOSEC) 40 MG capsule  Take 40 mg by mouth every morning (before breakfast)              ranolazine (RANEXA) 500 MG extended release tablet  Take 500 mg by mouth daily             rosuvastatin (CRESTOR) 10 MG tablet  Take 10 mg by mouth daily.              sucralfate (CARAFATE) 1 GM tablet  Take 1 g by mouth daily             tamsulosin (FLOMAX) 0.4 MG capsule  Take 0.4 mg by mouth daily             warfarin (COUMADIN) 5 MG tablet  Take 5 mg by mouth Six times weekly (Every day except Tuesday)                 Discharge Exam:  General Appearance:    Alert, cooperative, no distress, appears stated age-he is normally interactive with diagnosis of dementia   Head:    Normocephalic, without obvious abnormality, atraumatic   Eyes:    PE-and miotic conjunctiva/corneas clear   Neck:   Symmetrical, trachea

## 2019-04-04 NOTE — PROGRESS NOTES
Pharmacy Consultation Note  (Warfarin Dosing and Monitoring)    Initial consult date: 4/2/196  Consulting physician: Dr. Manjinder Garza    Allergies:  Heparin; Tape [place] tape]; and Tape [place] tape]    Ht Readings from Last 1 Encounters:   04/01/19 5' 9\" (1.753 m)     Wt Readings from Last 1 Encounters:   04/01/19 160 lb (72.6 kg)         Warfarin Indication Target   INR [place] Dose  (if applicable)   Diet/Feeding Tube   Bilateral Pulmonary Emboli 2-3 Warfarin 5 mg daily except on Tuesdays (patient reported he does not take warfarin on Tuesdays) DIET CARDIAC;       DI Interaction Effect with warfarin Comment                      Vitamin K or Blood product  Administration Date                    TSH:    Lab Results   Component Value Date    TSH 1.495 04/16/2013        Hepatic Function Panel:                            Lab Results   Component Value Date    ALKPHOS 67 04/01/2019    ALT 20 04/01/2019    AST 24 04/01/2019    PROT 6.3 04/01/2019    BILITOT 0.4 04/01/2019    LABALBU 3.6 04/01/2019       Date Warfarin Dose INR Heparin or LMWH HBG/HCT PLT Comment   4/2 -- 2.4 X X X    4/3 5 mg 1.8 X X X    4/4 7.5 mg 1.7 X X X                        Assessment:  · Anna Reeves is a [de-identified] y.o. male who presented to the emergency department s/p fall with multiple rib fractures. Patient is currently on warfarin for bilateral pulmonary emboli, which did not need reversed secondary to the aforementioned. Upon admission his INR was therapeutic (2.5) and clinical pharmacy was consulted to dose/monitor warfarin. · Goal INR 2 - 3  · INR today 1.7    Plan:  · Will give additional 2.5 mg of warfarin tonight for a total of 7.5 mg to be given. · Daily PT/INR until the INR is stable within the therapeutic range  · Pharmacist will follow and monitor/adjust dosing as necessary    Thank you for this consult. Please page with questions.     Elyssa Escobedo, PharmD-PGY1 4/4/2019 2:27 PM   Pager: [phone]

## 2019-04-04 NOTE — CARE COORDINATION
LEONCIO Discharge planning:    LEONCIO set up transportation to [address] [facility] [place] today at [address] [address] [facility] [place] through [employer] [phone]. LEONCIO notified patient, patients family, nursing and Bhavna Hudson from [facility]. Envelope and n17 completed. If transportation needs canceled please call [employer] at [phone]. Patient will need PT eval prior to discharging. PT dept notified.  Madeleine Mcintyre [place]

## 2019-04-04 NOTE — PROGRESS NOTES
Patient's family is wanting to talk with Dr. Lorraine Lanier, he has been perfect served I also gave him the number for the room if he would prefer to call.

## 2019-04-23 ENCOUNTER — OFFICE VISIT (OUTPATIENT)
Dept: GERIATRIC MEDICINE | Age: 79
End: 2019-04-23
Payer: MEDICARE

## 2019-04-23 VITALS
DIASTOLIC BLOOD PRESSURE: 60 MMHG | SYSTOLIC BLOOD PRESSURE: 128 MMHG | RESPIRATION RATE: 16 BRPM | HEART RATE: 68 BPM | WEIGHT: 153.7 LBS | BODY MASS INDEX: 23.3 KG/M2 | HEIGHT: 68 IN | TEMPERATURE: 97.8 F

## 2019-04-23 DIAGNOSIS — G31.84 MCI (MILD COGNITIVE IMPAIRMENT): Primary | ICD-10-CM

## 2019-04-23 PROCEDURE — G8427 DOCREV CUR MEDS BY ELIG CLIN: HCPCS | Performed by: INTERNAL MEDICINE

## 2019-04-23 PROCEDURE — 1123F ACP DISCUSS/DSCN MKR DOCD: CPT | Performed by: INTERNAL MEDICINE

## 2019-04-23 PROCEDURE — 99211 OFF/OP EST MAY X REQ PHY/QHP: CPT | Performed by: INTERNAL MEDICINE

## 2019-04-23 PROCEDURE — 99212 OFFICE O/P EST SF 10 MIN: CPT | Performed by: INTERNAL MEDICINE

## 2019-04-23 PROCEDURE — 1111F DSCHRG MED/CURRENT MED MERGE: CPT | Performed by: INTERNAL MEDICINE

## 2019-04-23 PROCEDURE — G8598 ASA/ANTIPLAT THER USED: HCPCS | Performed by: INTERNAL MEDICINE

## 2019-04-23 PROCEDURE — G8420 CALC BMI NORM PARAMETERS: HCPCS | Performed by: INTERNAL MEDICINE

## 2019-04-23 PROCEDURE — 4040F PNEUMOC VAC/ADMIN/RCVD: CPT | Performed by: INTERNAL MEDICINE

## 2019-04-23 PROCEDURE — 1036F TOBACCO NON-USER: CPT | Performed by: INTERNAL MEDICINE

## 2019-04-23 ASSESSMENT — PATIENT HEALTH QUESTIONNAIRE - PHQ9
2. FEELING DOWN, DEPRESSED OR HOPELESS: 0
SUM OF ALL RESPONSES TO PHQ QUESTIONS 1-9: 0
SUM OF ALL RESPONSES TO PHQ9 QUESTIONS 1 & 2: 0
1. LITTLE INTEREST OR PLEASURE IN DOING THINGS: 0
SUM OF ALL RESPONSES TO PHQ QUESTIONS 1-9: 0

## 2019-04-23 NOTE — PROGRESS NOTES
In rehab at [facility] since April 4   HYM? \"Not as good\"  Here with wife and daughter   [facility] a [facility]   More confusion with translocation Using walker on rehab  And fell twice at home without walker   Adjusted poorly even before move to [address] [place] [address] dreams throughout day   In [address] in [facility] [address] [place] [address]  Wife will become caregiver at apartment   On [address] that he is in rehab  Could not remember I saw him in [facility] and bored   [address] 2/3 and clock 4/4   Impression;[facility]                     [place]                      Recent Falls and Move to [facility]    Plan: Aricept 20 mg and Namenda XR 28 mg a day

## 2019-04-23 NOTE — PATIENT INSTRUCTIONS
Patient Education        Preventing Falls: Care Instructions  Your Care Instructions    Getting around your home safely can be a challenge if you have injuries or health problems that make it easy for you to fall. Loose rugs and furniture in walkways are among the dangers for many older people who have problems walking or who have poor eyesight. People who have conditions such as arthritis, osteoporosis, or dementia also have to be careful not to fall. You can make your home safer with a few simple measures. Follow-up care is a key part of your treatment and safety. Be sure to make and go to all appointments, and call your doctor if you are having problems. It's also a good idea to know your test results and keep a list of the medicines you take. How can you care for yourself at home? Taking care of yourself  · You may get dizzy if you do not drink enough water. To prevent dehydration, drink plenty of fluids, enough so that your urine is light yellow or clear like water. Choose water and other caffeine-free clear liquids. If you have kidney, heart, or liver disease and have to limit fluids, talk with your doctor before you increase the amount of fluids you drink. · Exercise regularly to improve your strength, muscle tone, and balance. Walk if you can. Swimming may be a good choice if you cannot walk easily. · Have your vision and hearing checked each year or any time you notice a change. If you have trouble seeing and hearing, you might not be able to avoid objects and could lose your balance. · Know the side effects of the medicines you take. Ask your doctor or pharmacist whether the medicines you take can affect your balance. Sleeping pills or sedatives can affect your balance. · Limit the amount of alcohol you drink. Alcohol can impair your balance and other senses. · Ask your doctor whether calluses or corns on your feet need to be removed.  If you wear loose-fitting shoes because of calluses or corns, you can lose your balance and fall. · Talk to your doctor if you have numbness in your feet. Preventing falls at home  · Remove raised doorway thresholds, throw rugs, and clutter. Repair loose carpet or raised areas in the floor. · Move furniture and electrical cords to keep them out of walking paths. · Use nonskid floor wax, and wipe up spills right away, especially on ceramic tile floors. · If you use a walker or cane, put rubber tips on it. If you use crutches, clean the bottoms of them regularly with an abrasive pad, such as steel wool. · Keep your house well lit, especially Ladene Butler, and outside walkways. Use night-lights in areas such as hallways and bathrooms. Add extra light switches or use remote switches (such as switches that go on or off when you clap your hands) to make it easier to turn lights on if you have to get up during the night. · Install sturdy handrails on stairways. · Move items in your cabinets so that the things you use a lot are on the lower shelves (about waist level). · Keep a cordless phone and a flashlight with new batteries by your bed. If possible, put a phone in each of the main rooms of your house, or carry a cell phone in case you fall and cannot reach a phone. Or, you can wear a device around your neck or wrist. You push a button that sends a signal for help. · Wear low-heeled shoes that fit well and give your feet good support. Use footwear with nonskid soles. Check the heels and soles of your shoes for wear. Repair or replace worn heels or soles. · Do not wear socks without shoes on wood floors. · Walk on the grass when the sidewalks are slippery. If you live in an area that gets snow and ice in the winter, sprinkle salt on slippery steps and sidewalks. Preventing falls in the bath  · Install grab bars and nonskid mats inside and outside your shower or tub and near the toilet and sinks. · Use shower chairs and bath benches.   · Use a hand-held shower head that will allow you to sit while showering. · Get into a tub or shower by putting the weaker leg in first. Get out of a tub or shower with your strong side first.  · Repair loose toilet seats and consider installing a raised toilet seat to make getting on and off the toilet easier. · Keep your bathroom door unlocked while you are in the shower. Where can you learn more? Go to https://TIO NetworkspepicT-Networkseb.Bionaturis. org and sign in to your [employer] account. Enter [phone] in the SCVNGR box to learn more about \"Preventing Falls: Care Instructions. \"     If you do not have an account, please click on the \"Sign Up Now\" link. Current as of: March 15, 2018  Content Version: 11.9  © 1120-0242 [employer]. Care instructions adapted under license by [facility] ([facility]). If you have questions about a medical condition or this instruction, always ask your healthcare professional. [place] [address] any warranty or liability for your use of this information.

## 2019-06-12 ENCOUNTER — HOSPITAL ENCOUNTER (OUTPATIENT)
Dept: ULTRASOUND IMAGING | Age: 79
Discharge: HOME | End: 2019-06-14
Payer: MEDICARE

## 2019-06-12 ENCOUNTER — OFFICE VISIT (OUTPATIENT)
Dept: PODIATRY | Age: 79
End: 2019-06-12
Payer: MEDICARE

## 2019-06-12 VITALS
TEMPERATURE: 96.9 F | BODY MASS INDEX: 24.94 KG/M2 | DIASTOLIC BLOOD PRESSURE: 60 MMHG | SYSTOLIC BLOOD PRESSURE: 112 MMHG | WEIGHT: 164 LBS

## 2019-06-12 DIAGNOSIS — I82.491 ACUTE DEEP VEIN THROMBOSIS (DVT) OF OTHER SPECIFIED VEIN OF RIGHT LOWER EXTREMITY (HCC): ICD-10-CM

## 2019-06-12 DIAGNOSIS — I73.9 PERIPHERAL VASCULAR DISEASE, UNSPECIFIED (HCC): ICD-10-CM

## 2019-06-12 DIAGNOSIS — M79.674 PAIN IN TOE OF RIGHT FOOT: ICD-10-CM

## 2019-06-12 DIAGNOSIS — R26.2 DIFFICULTY WALKING: ICD-10-CM

## 2019-06-12 DIAGNOSIS — I82.491 ACUTE DEEP VEIN THROMBOSIS (DVT) OF OTHER SPECIFIED VEIN OF RIGHT LOWER EXTREMITY (HCC): Primary | ICD-10-CM

## 2019-06-12 DIAGNOSIS — B35.1 TINEA UNGUIUM: ICD-10-CM

## 2019-06-12 DIAGNOSIS — R60.0 LOCALIZED EDEMA: ICD-10-CM

## 2019-06-12 DIAGNOSIS — M79.675 PAIN IN LEFT TOE(S): ICD-10-CM

## 2019-06-12 PROBLEM — I82.409 DEEP VENOUS THROMBOSIS (HCC): Status: ACTIVE | Noted: 2019-06-12

## 2019-06-12 PROCEDURE — 11721 DEBRIDE NAIL 6 OR MORE: CPT | Performed by: PODIATRIST

## 2019-06-12 PROCEDURE — 93971 EXTREMITY STUDY: CPT

## 2019-06-12 PROCEDURE — 99213 OFFICE O/P EST LOW 20 MIN: CPT | Performed by: PODIATRIST

## 2019-06-12 RX ORDER — LORATADINE 10 MG/1
TABLET ORAL
COMMUNITY
End: 2020-07-08

## 2019-06-12 RX ORDER — MIRTAZAPINE 7.5 MG/1
TABLET, FILM COATED ORAL
COMMUNITY
End: 2019-06-12

## 2019-06-12 RX ORDER — SERTRALINE HYDROCHLORIDE 100 MG/1
TABLET, FILM COATED ORAL
COMMUNITY
End: 2019-06-12

## 2019-06-12 RX ORDER — LISINOPRIL 10 MG/1
10 TABLET ORAL
Status: ON HOLD | COMMUNITY
Start: 2016-02-11 | End: 2019-12-08 | Stop reason: HOSPADM

## 2019-06-12 RX ORDER — ASPIRIN 81 MG/1
81 TABLET, CHEWABLE ORAL
COMMUNITY
Start: 2016-03-03 | End: 2019-06-12

## 2019-06-12 ASSESSMENT — ENCOUNTER SYMPTOMS
EYES NEGATIVE: 1
RESPIRATORY NEGATIVE: 1
GASTROINTESTINAL NEGATIVE: 1

## 2019-06-12 NOTE — PROGRESS NOTES
Clinton Haroon  Return Patient    Chief Complaint   Patient presents with    Toe Pain     nail care saw PCP Dr. Liset Beltran on 6/5/2019       Subjective: This Alex Efra comes to clinic for foot and nail care. Pt currently has complaint of thickened, painful, elongated nails that he/she cannot manage by themselves. Pt. Relates pain to nails with shoe gear. Pt's primary care physician is Yevgeniy Vera MD.new issue left foot ankle swelling 2 weeks  Pt did fx pelvis   Months ago    Past Medical History:   Diagnosis Date    Acute myocardial infarction, unspecified site 1995    Myocardial Infarction    Alzheimer's disease     Anemia associated with acute blood loss 4/11/2013    BPH associated with nocturia 4/9/2013    CAD (coronary artery disease), native coronary artery 4/9/2013    DVT, lower extremity, distal ([facility] [address] 4/9/2013    HTN, goal below 130/80 4/9/2013    Hyperlipidemia LDL goal < 70 4/9/2013    Pulmonary emboli ([facility] [address] 4/9/2013    Retroperitoneal hematoma 4/11/2013    Shingles 2008       Allergies   Allergen Reactions    Heparin Other (See Comments)     \"Reaction\" to heparin per wife. Retroperitoneal hematoma.  Tape Ernesta Brocks Tape] Other (See Comments)     Skin tears.     Tape Ernesta Brocks Tape] Rash     Current Outpatient Medications on File Prior to Visit   Medication Sig Dispense Refill    lisinopril (PRINIVIL;ZESTRIL) 10 MG tablet Take by mouth      loratadine (CLARITIN) 10 MG tablet       ranolazine (RANEXA) 500 MG extended release tablet Take 500 mg by mouth daily      sucralfate (CARAFATE) 1 GM tablet Take 1 g by mouth daily      finasteride (PROSCAR) 5 MG tablet Take 5 mg by mouth three times a week On Monday, Wednesday, and Friday      tamsulosin (FLOMAX) 0.4 MG capsule Take 0.4 mg by mouth daily      cyanocobalamin 1000 MCG/ML injection Inject 1,000 mcg into the muscle every 30 days      magnesium hydroxide (MILK OF MAGNESIA) 400 MG/5ML suspension Take by mouth right foot    5. Pain in left toe(s)    6. Difficulty walking     No orders of the defined types were placed in this encounter. Plan:   Pt was evaluated and examined. Patient was given personalized discharge instructions. Nails 1-10 were debrided in length and thickness sharply with a nail nipper and  without incident. Pt will follow up in 9 weeks or sooner if any problems arise. Diagnosis was discussed with the pt and all of their questions were answered in detail. Proper foot hygiene and care was discussed with the pt. Patient to check feet daily and contact the office with any questions/problems/concerns. Other comorbidity noted and will be managed by PCP. Pain waiver discussed with patient and confirmed.      Stat ultra sound to r/o   dvt     Electronically signed by Kwame Montelongo DPM on 6/12/2019 at 3:08 PM  6/12/2019

## 2019-08-14 ENCOUNTER — PROCEDURE VISIT (OUTPATIENT)
Dept: PODIATRY | Age: 79
End: 2019-08-14
Payer: MEDICARE

## 2019-08-14 VITALS
BODY MASS INDEX: 23.26 KG/M2 | DIASTOLIC BLOOD PRESSURE: 68 MMHG | WEIGHT: 153 LBS | SYSTOLIC BLOOD PRESSURE: 110 MMHG | TEMPERATURE: 97.9 F

## 2019-08-14 DIAGNOSIS — M79.675 PAIN IN LEFT TOE(S): ICD-10-CM

## 2019-08-14 DIAGNOSIS — R26.2 DIFFICULTY WALKING: ICD-10-CM

## 2019-08-14 DIAGNOSIS — M79.674 PAIN IN TOE OF RIGHT FOOT: ICD-10-CM

## 2019-08-14 DIAGNOSIS — I73.9 PERIPHERAL VASCULAR DISEASE, UNSPECIFIED (HCC): ICD-10-CM

## 2019-08-14 DIAGNOSIS — B35.1 TINEA UNGUIUM: Primary | ICD-10-CM

## 2019-08-14 PROCEDURE — 11721 DEBRIDE NAIL 6 OR MORE: CPT | Performed by: PODIATRIST

## 2019-08-14 NOTE — PROGRESS NOTES
Left      Nails that are described above are all elongated thickened pitting mycotic yellowish incurvated causing pain with both shoe gear. Palpation nails greater then 3 mm thick painful       Dermatologic Exam:hair loss noted  lower extremity    Skin lesion/ulceration   Skin   Callus   Musculoskeletal:     1st MPJ ROM normal, Bilateral.  Muscle strength 5/5, Bilateral.  Pain present upon palpation of toenails 1-5   Bilateral., Bilateral.  Ankle ROM normal,Bilateral.    Dorsally contracted digits, Bilateral.     Vascular:  Pulses   bilateral DP     PT absnet    Neurological:  Sensation present to light touch to level of digits, Bilateral.    Foot Exam    General  General Appearance: appears stated age and healthy   Orientation: alert and oriented to person, place, and time   Assistance: walker use       Right Foot/Ankle     Inspection and Palpation  Skin Exam: skin changes and abnormal color; Neurovascular  Dorsalis pedis: absent  Posterior tibial: absent      Left Foot/Ankle      Inspection and Palpation  Skin Exam: skin changes and abnormal color; Neurovascular  Dorsalis pedis: absent  Posterior tibial: absent             Ortho Exam  Q7   []Yes    []No                Q8   [x]Yes    []No                     Q9   []Yes    []No  Assessment:  [age] [facility] y.o. male with:   1. Tinea unguium    2. Pain in left toe(s)    3. Pain in toe of right foot    4. Peripheral vascular disease, unspecified ([address]    5. Difficulty walking     No orders of the defined types were placed in this encounter. Plan:   Pt was evaluated and examined. Patient was given personalized discharge instructions. Nails 1-10 were debrided in length and thickness sharply with a nail nipper and  without incident. Pt will follow up in 9 weeks or sooner if any problems arise. Diagnosis was discussed with the pt and all of their questions were answered in detail.  Proper foot hygiene and care was discussed with the pt. Patient to check feet daily and contact the office with any questions/problems/concerns. Other comorbidity noted and will be managed by PCP. Pain waiver discussed with patient and confirmed.    8/14/2019      Electronically signed by Shania Frazier DPM on 8/14/2019 at 2:47 PM  8/14/2019

## 2019-10-15 ENCOUNTER — OFFICE VISIT (OUTPATIENT)
Dept: GERIATRIC MEDICINE | Age: 79
End: 2019-10-15
Payer: MEDICARE

## 2019-10-15 VITALS
WEIGHT: 153.2 LBS | HEIGHT: 66 IN | BODY MASS INDEX: 24.62 KG/M2 | HEART RATE: 64 BPM | RESPIRATION RATE: 16 BRPM | DIASTOLIC BLOOD PRESSURE: 64 MMHG | TEMPERATURE: 97.8 F | SYSTOLIC BLOOD PRESSURE: 118 MMHG

## 2019-10-15 DIAGNOSIS — G31.84 MCI (MILD COGNITIVE IMPAIRMENT): Primary | ICD-10-CM

## 2019-10-15 PROCEDURE — 4040F PNEUMOC VAC/ADMIN/RCVD: CPT | Performed by: INTERNAL MEDICINE

## 2019-10-15 PROCEDURE — 99211 OFF/OP EST MAY X REQ PHY/QHP: CPT | Performed by: INTERNAL MEDICINE

## 2019-10-15 PROCEDURE — G8598 ASA/ANTIPLAT THER USED: HCPCS | Performed by: INTERNAL MEDICINE

## 2019-10-15 PROCEDURE — 1123F ACP DISCUSS/DSCN MKR DOCD: CPT | Performed by: INTERNAL MEDICINE

## 2019-10-15 PROCEDURE — G8420 CALC BMI NORM PARAMETERS: HCPCS | Performed by: INTERNAL MEDICINE

## 2019-10-15 PROCEDURE — 99212 OFFICE O/P EST SF 10 MIN: CPT | Performed by: INTERNAL MEDICINE

## 2019-10-15 PROCEDURE — G8427 DOCREV CUR MEDS BY ELIG CLIN: HCPCS | Performed by: INTERNAL MEDICINE

## 2019-10-15 PROCEDURE — G8484 FLU IMMUNIZE NO ADMIN: HCPCS | Performed by: INTERNAL MEDICINE

## 2019-10-15 PROCEDURE — 1036F TOBACCO NON-USER: CPT | Performed by: INTERNAL MEDICINE

## 2019-10-17 ENCOUNTER — PROCEDURE VISIT (OUTPATIENT)
Dept: PODIATRY | Age: 79
End: 2019-10-17
Payer: MEDICARE

## 2019-10-17 VITALS
BODY MASS INDEX: 25.18 KG/M2 | SYSTOLIC BLOOD PRESSURE: 122 MMHG | DIASTOLIC BLOOD PRESSURE: 70 MMHG | WEIGHT: 156 LBS | TEMPERATURE: 97.8 F

## 2019-10-17 DIAGNOSIS — M79.675 PAIN IN LEFT TOE(S): ICD-10-CM

## 2019-10-17 DIAGNOSIS — M79.674 PAIN IN TOE OF RIGHT FOOT: ICD-10-CM

## 2019-10-17 DIAGNOSIS — R26.2 DIFFICULTY WALKING: ICD-10-CM

## 2019-10-17 DIAGNOSIS — B35.1 TINEA UNGUIUM: Primary | ICD-10-CM

## 2019-10-17 DIAGNOSIS — I73.9 PERIPHERAL VASCULAR DISEASE, UNSPECIFIED (HCC): ICD-10-CM

## 2019-10-17 PROCEDURE — 11721 DEBRIDE NAIL 6 OR MORE: CPT | Performed by: PODIATRIST

## 2019-10-17 ASSESSMENT — ENCOUNTER SYMPTOMS
EYES NEGATIVE: 1
RESPIRATORY NEGATIVE: 1

## 2019-10-23 ENCOUNTER — HOSPITAL ENCOUNTER (OUTPATIENT)
Age: 79
Discharge: HOME | End: 2019-10-25
Payer: MEDICARE

## 2019-10-23 LAB — PROSTATE SPECIFIC ANTIGEN: 3.06 NG/ML (ref 0–4)

## 2019-10-23 PROCEDURE — 84153 ASSAY OF PSA TOTAL: CPT

## 2019-12-05 ENCOUNTER — APPOINTMENT (OUTPATIENT)
Dept: CT IMAGING | Age: 79
DRG: 392 | End: 2019-12-05
Payer: MEDICARE

## 2019-12-05 ENCOUNTER — HOSPITAL ENCOUNTER (INPATIENT)
Age: 79
LOS: 3 days | Discharge: HOME | DRG: 392 | End: 2019-12-08
Attending: EMERGENCY MEDICINE | Admitting: INTERNAL MEDICINE
Payer: MEDICARE

## 2019-12-05 DIAGNOSIS — K56.7 ILEUS (HCC): ICD-10-CM

## 2019-12-05 DIAGNOSIS — R77.8 ELEVATED TROPONIN: ICD-10-CM

## 2019-12-05 DIAGNOSIS — J18.9 PNEUMONIA DUE TO ORGANISM: ICD-10-CM

## 2019-12-05 DIAGNOSIS — R79.1 ELEVATED INR: ICD-10-CM

## 2019-12-05 DIAGNOSIS — K57.32 DIVERTICULITIS OF COLON: Primary | ICD-10-CM

## 2019-12-05 DIAGNOSIS — R10.9 ABDOMINAL PAIN, UNSPECIFIED ABDOMINAL LOCATION: ICD-10-CM

## 2019-12-05 PROBLEM — K57.92 DIVERTICULITIS: Status: ACTIVE | Noted: 2019-12-05

## 2019-12-05 LAB
ALBUMIN SERPL-MCNC: 3.7 G/DL (ref 3.5–5.2)
ALP BLD-CCNC: 68 U/L (ref 40–129)
ALT SERPL-CCNC: 12 U/L (ref 0–40)
ANION GAP SERPL CALCULATED.3IONS-SCNC: 8 MMOL/L (ref 7–16)
AST SERPL-CCNC: 15 U/L (ref 0–39)
BASOPHILS ABSOLUTE: 0 E9/L (ref 0–0.2)
BASOPHILS RELATIVE PERCENT: 0.1 % (ref 0–2)
BILIRUB SERPL-MCNC: 1.1 MG/DL (ref 0–1.2)
BILIRUBIN URINE: NEGATIVE
BLOOD, URINE: NEGATIVE
BUN BLDV-MCNC: 19 MG/DL (ref 8–23)
BURR CELLS: ABNORMAL
CALCIUM SERPL-MCNC: 9.2 MG/DL (ref 8.6–10.2)
CHLORIDE BLD-SCNC: 106 MMOL/L (ref 98–107)
CLARITY: CLEAR
CO2: 26 MMOL/L (ref 22–29)
COLOR: YELLOW
CREAT SERPL-MCNC: 1 MG/DL (ref 0.7–1.2)
EOSINOPHILS ABSOLUTE: 0 E9/L (ref 0.05–0.5)
EOSINOPHILS RELATIVE PERCENT: 0.3 % (ref 0–6)
GFR AFRICAN AMERICAN: >60
GFR NON-AFRICAN AMERICAN: >60 ML/MIN/1.73
GLUCOSE BLD-MCNC: 123 MG/DL (ref 74–99)
GLUCOSE URINE: NEGATIVE MG/DL
HCT VFR BLD CALC: 43.9 % (ref 37–54)
HEMOGLOBIN: 13.5 G/DL (ref 12.5–16.5)
INR BLD: 3.1
KETONES, URINE: NEGATIVE MG/DL
LACTIC ACID: 1.3 MMOL/L (ref 0.5–2.2)
LEUKOCYTE ESTERASE, URINE: NEGATIVE
LIPASE: 3 U/L (ref 13–60)
LYMPHOCYTES ABSOLUTE: 0.44 E9/L (ref 1.5–4)
LYMPHOCYTES RELATIVE PERCENT: 3.5 % (ref 20–42)
MCH RBC QN AUTO: 31.7 PG (ref 26–35)
MCHC RBC AUTO-ENTMCNC: 30.8 % (ref 32–34.5)
MCV RBC AUTO: 103.1 FL (ref 80–99.9)
MONOCYTES ABSOLUTE: 0.76 E9/L (ref 0.1–0.95)
MONOCYTES RELATIVE PERCENT: 7 % (ref 2–12)
NEUTROPHILS ABSOLUTE: 9.81 E9/L (ref 1.8–7.3)
NEUTROPHILS RELATIVE PERCENT: 89.6 % (ref 43–80)
NITRITE, URINE: NEGATIVE
PDW BLD-RTO: 14.1 FL (ref 11.5–15)
PH UA: 5 (ref 5–9)
PLATELET # BLD: 175 E9/L (ref 130–450)
PMV BLD AUTO: 9.3 FL (ref 7–12)
POIKILOCYTES: ABNORMAL
POTASSIUM SERPL-SCNC: 4.1 MMOL/L (ref 3.5–5)
PROTEIN UA: NEGATIVE MG/DL
PROTHROMBIN TIME: 35.7 SEC (ref 9.3–12.4)
RBC # BLD: 4.26 E12/L (ref 3.8–5.8)
SODIUM BLD-SCNC: 140 MMOL/L (ref 132–146)
SPECIFIC GRAVITY UA: <=1.005 (ref 1–1.03)
TOTAL PROTEIN: 6.5 G/DL (ref 6.4–8.3)
TROPONIN: 0.04 NG/ML (ref 0–0.03)
UROBILINOGEN, URINE: 1 E.U./DL
WBC # BLD: 10.9 E9/L (ref 4.5–11.5)

## 2019-12-05 PROCEDURE — 84484 ASSAY OF TROPONIN QUANT: CPT

## 2019-12-05 PROCEDURE — 87186 SC STD MICRODIL/AGAR DIL: CPT

## 2019-12-05 PROCEDURE — 74177 CT ABD & PELVIS W/CONTRAST: CPT

## 2019-12-05 PROCEDURE — 2500000003 HC RX 250 WO HCPCS: Performed by: SURGERY

## 2019-12-05 PROCEDURE — 85025 COMPLETE CBC W/AUTO DIFF WBC: CPT

## 2019-12-05 PROCEDURE — 2580000003 HC RX 258: Performed by: STUDENT IN AN ORGANIZED HEALTH CARE EDUCATION/TRAINING PROGRAM

## 2019-12-05 PROCEDURE — 83605 ASSAY OF LACTIC ACID: CPT

## 2019-12-05 PROCEDURE — 6360000004 HC RX CONTRAST MEDICATION: Performed by: RADIOLOGY

## 2019-12-05 PROCEDURE — 6360000002 HC RX W HCPCS: Performed by: STUDENT IN AN ORGANIZED HEALTH CARE EDUCATION/TRAINING PROGRAM

## 2019-12-05 PROCEDURE — 87077 CULTURE AEROBIC IDENTIFY: CPT

## 2019-12-05 PROCEDURE — 93005 ELECTROCARDIOGRAM TRACING: CPT | Performed by: STUDENT IN AN ORGANIZED HEALTH CARE EDUCATION/TRAINING PROGRAM

## 2019-12-05 PROCEDURE — 2580000003 HC RX 258: Performed by: PHYSICIAN ASSISTANT

## 2019-12-05 PROCEDURE — 96375 TX/PRO/DX INJ NEW DRUG ADDON: CPT

## 2019-12-05 PROCEDURE — 83690 ASSAY OF LIPASE: CPT

## 2019-12-05 PROCEDURE — 99285 EMERGENCY DEPT VISIT HI MDM: CPT

## 2019-12-05 PROCEDURE — 87088 URINE BACTERIA CULTURE: CPT

## 2019-12-05 PROCEDURE — 80053 COMPREHEN METABOLIC PANEL: CPT

## 2019-12-05 PROCEDURE — 96365 THER/PROPH/DIAG IV INF INIT: CPT

## 2019-12-05 PROCEDURE — 81003 URINALYSIS AUTO W/O SCOPE: CPT

## 2019-12-05 PROCEDURE — 85610 PROTHROMBIN TIME: CPT

## 2019-12-05 PROCEDURE — 1200000000 HC SEMI PRIVATE

## 2019-12-05 PROCEDURE — 36415 COLL VENOUS BLD VENIPUNCTURE: CPT

## 2019-12-05 RX ORDER — FENTANYL CITRATE 50 UG/ML
50 INJECTION, SOLUTION INTRAMUSCULAR; INTRAVENOUS ONCE
Status: COMPLETED | OUTPATIENT
Start: 2019-12-05 | End: 2019-12-05

## 2019-12-05 RX ORDER — 0.9 % SODIUM CHLORIDE 0.9 %
1000 INTRAVENOUS SOLUTION INTRAVENOUS ONCE
Status: COMPLETED | OUTPATIENT
Start: 2019-12-05 | End: 2019-12-05

## 2019-12-05 RX ADMIN — FENTANYL CITRATE 50 MCG: 50 INJECTION, SOLUTION INTRAMUSCULAR; INTRAVENOUS at 15:35

## 2019-12-05 RX ADMIN — IOPAMIDOL 110 ML: 755 INJECTION, SOLUTION INTRAVENOUS at 19:16

## 2019-12-05 RX ADMIN — IOHEXOL 50 ML: 240 INJECTION, SOLUTION INTRATHECAL; INTRAVASCULAR; INTRAVENOUS; ORAL at 19:15

## 2019-12-05 RX ADMIN — METRONIDAZOLE 500 MG: 500 INJECTION, SOLUTION INTRAVENOUS at 23:45

## 2019-12-05 RX ADMIN — SODIUM CHLORIDE 1000 ML: 9 INJECTION, SOLUTION INTRAVENOUS at 15:35

## 2019-12-05 RX ADMIN — CEFTRIAXONE 2 G: 2 INJECTION, POWDER, FOR SOLUTION INTRAMUSCULAR; INTRAVENOUS at 20:46

## 2019-12-05 ASSESSMENT — ENCOUNTER SYMPTOMS
WHEEZING: 0
SORE THROAT: 0
NAUSEA: 0
DIARRHEA: 0
SHORTNESS OF BREATH: 0
BACK PAIN: 0
RHINORRHEA: 0
VOMITING: 0
CHEST TIGHTNESS: 0
BLOOD IN STOOL: 0
ABDOMINAL PAIN: 1
CONSTIPATION: 0
COUGH: 0

## 2019-12-05 ASSESSMENT — PAIN SCALES - GENERAL
PAINLEVEL_OUTOF10: 7
PAINLEVEL_OUTOF10: 0
PAINLEVEL_OUTOF10: 7

## 2019-12-06 ENCOUNTER — TELEPHONE (OUTPATIENT)
Dept: GERIATRIC MEDICINE | Age: 79
End: 2019-12-06

## 2019-12-06 LAB
ANION GAP SERPL CALCULATED.3IONS-SCNC: 8 MMOL/L (ref 7–16)
BASOPHILS ABSOLUTE: 0 E9/L (ref 0–0.2)
BASOPHILS RELATIVE PERCENT: 0.2 % (ref 0–2)
BUN BLDV-MCNC: 14 MG/DL (ref 8–23)
CALCIUM SERPL-MCNC: 9 MG/DL (ref 8.6–10.2)
CHLORIDE BLD-SCNC: 102 MMOL/L (ref 98–107)
CO2: 29 MMOL/L (ref 22–29)
CREAT SERPL-MCNC: 0.8 MG/DL (ref 0.7–1.2)
EKG ATRIAL RATE: 82 BPM
EKG Q-T INTERVAL: 448 MS
EKG QRS DURATION: 168 MS
EKG QTC CALCULATION (BAZETT): 516 MS
EKG R AXIS: -56 DEGREES
EKG T AXIS: 119 DEGREES
EKG VENTRICULAR RATE: 80 BPM
EOSINOPHILS ABSOLUTE: 0 E9/L (ref 0.05–0.5)
EOSINOPHILS RELATIVE PERCENT: 0.2 % (ref 0–6)
GFR AFRICAN AMERICAN: >60
GFR NON-AFRICAN AMERICAN: >60 ML/MIN/1.73
GLUCOSE BLD-MCNC: 103 MG/DL (ref 74–99)
HCT VFR BLD CALC: 37.5 % (ref 37–54)
HEMOGLOBIN: 11.9 G/DL (ref 12.5–16.5)
LYMPHOCYTES ABSOLUTE: 0.64 E9/L (ref 1.5–4)
LYMPHOCYTES RELATIVE PERCENT: 5.2 % (ref 20–42)
MCH RBC QN AUTO: 32.1 PG (ref 26–35)
MCHC RBC AUTO-ENTMCNC: 31.7 % (ref 32–34.5)
MCV RBC AUTO: 101.1 FL (ref 80–99.9)
MONOCYTES ABSOLUTE: 0.64 E9/L (ref 0.1–0.95)
MONOCYTES RELATIVE PERCENT: 5.2 % (ref 2–12)
NEUTROPHILS ABSOLUTE: 11.43 E9/L (ref 1.8–7.3)
NEUTROPHILS RELATIVE PERCENT: 89.6 % (ref 43–80)
OVALOCYTES: ABNORMAL
PDW BLD-RTO: 14.2 FL (ref 11.5–15)
PLATELET # BLD: 160 E9/L (ref 130–450)
PMV BLD AUTO: 9.7 FL (ref 7–12)
POIKILOCYTES: ABNORMAL
POTASSIUM SERPL-SCNC: 4.3 MMOL/L (ref 3.5–5)
PROCALCITONIN: 0.41 NG/ML (ref 0–0.08)
RBC # BLD: 3.71 E12/L (ref 3.8–5.8)
SODIUM BLD-SCNC: 139 MMOL/L (ref 132–146)
TROPONIN: 0.03 NG/ML (ref 0–0.03)
TROPONIN: 0.04 NG/ML (ref 0–0.03)
WBC # BLD: 12.7 E9/L (ref 4.5–11.5)

## 2019-12-06 PROCEDURE — 2580000003 HC RX 258: Performed by: SURGERY

## 2019-12-06 PROCEDURE — 2580000003 HC RX 258: Performed by: INTERNAL MEDICINE

## 2019-12-06 PROCEDURE — 85025 COMPLETE CBC W/AUTO DIFF WBC: CPT

## 2019-12-06 PROCEDURE — 97530 THERAPEUTIC ACTIVITIES: CPT | Performed by: PHYSICAL THERAPIST

## 2019-12-06 PROCEDURE — 6370000000 HC RX 637 (ALT 250 FOR IP): Performed by: INTERNAL MEDICINE

## 2019-12-06 PROCEDURE — 99222 1ST HOSP IP/OBS MODERATE 55: CPT | Performed by: SURGERY

## 2019-12-06 PROCEDURE — 1200000000 HC SEMI PRIVATE

## 2019-12-06 PROCEDURE — 6360000002 HC RX W HCPCS: Performed by: SURGERY

## 2019-12-06 PROCEDURE — 36415 COLL VENOUS BLD VENIPUNCTURE: CPT

## 2019-12-06 PROCEDURE — 93010 ELECTROCARDIOGRAM REPORT: CPT | Performed by: INTERNAL MEDICINE

## 2019-12-06 PROCEDURE — 97161 PT EVAL LOW COMPLEX 20 MIN: CPT | Performed by: PHYSICAL THERAPIST

## 2019-12-06 PROCEDURE — 84484 ASSAY OF TROPONIN QUANT: CPT

## 2019-12-06 PROCEDURE — 2700000000 HC OXYGEN THERAPY PER DAY

## 2019-12-06 PROCEDURE — 97165 OT EVAL LOW COMPLEX 30 MIN: CPT

## 2019-12-06 PROCEDURE — 84145 PROCALCITONIN (PCT): CPT

## 2019-12-06 PROCEDURE — 80048 BASIC METABOLIC PNL TOTAL CA: CPT

## 2019-12-06 PROCEDURE — 94640 AIRWAY INHALATION TREATMENT: CPT

## 2019-12-06 PROCEDURE — 94664 DEMO&/EVAL PT USE INHALER: CPT

## 2019-12-06 PROCEDURE — 97535 SELF CARE MNGMENT TRAINING: CPT

## 2019-12-06 PROCEDURE — 2500000003 HC RX 250 WO HCPCS: Performed by: SURGERY

## 2019-12-06 RX ORDER — WARFARIN SODIUM 3 MG/1
3 TABLET ORAL DAILY
Status: DISCONTINUED | OUTPATIENT
Start: 2019-12-06 | End: 2019-12-06 | Stop reason: DRUGHIGH

## 2019-12-06 RX ORDER — AMLODIPINE BESYLATE 5 MG/1
5 TABLET ORAL DAILY
Status: DISCONTINUED | OUTPATIENT
Start: 2019-12-06 | End: 2019-12-08 | Stop reason: HOSPADM

## 2019-12-06 RX ORDER — PANTOPRAZOLE SODIUM 40 MG/1
40 TABLET, DELAYED RELEASE ORAL
Status: DISCONTINUED | OUTPATIENT
Start: 2019-12-06 | End: 2019-12-08 | Stop reason: HOSPADM

## 2019-12-06 RX ORDER — RANOLAZINE 500 MG/1
500 TABLET, EXTENDED RELEASE ORAL DAILY
Status: DISCONTINUED | OUTPATIENT
Start: 2019-12-06 | End: 2019-12-08 | Stop reason: HOSPADM

## 2019-12-06 RX ORDER — WARFARIN SODIUM 5 MG/1
5 TABLET ORAL
Status: COMPLETED | OUTPATIENT
Start: 2019-12-06 | End: 2019-12-06

## 2019-12-06 RX ORDER — IPRATROPIUM BROMIDE AND ALBUTEROL SULFATE 2.5; .5 MG/3ML; MG/3ML
1 SOLUTION RESPIRATORY (INHALATION)
Status: DISCONTINUED | OUTPATIENT
Start: 2019-12-06 | End: 2019-12-08 | Stop reason: HOSPADM

## 2019-12-06 RX ORDER — FINASTERIDE 5 MG/1
5 TABLET, FILM COATED ORAL
Status: DISCONTINUED | OUTPATIENT
Start: 2019-12-06 | End: 2019-12-08 | Stop reason: HOSPADM

## 2019-12-06 RX ORDER — SODIUM CHLORIDE 0.9 % (FLUSH) 0.9 %
10 SYRINGE (ML) INJECTION EVERY 12 HOURS SCHEDULED
Status: DISCONTINUED | OUTPATIENT
Start: 2019-12-06 | End: 2019-12-08 | Stop reason: HOSPADM

## 2019-12-06 RX ORDER — SODIUM CHLORIDE 0.9 % (FLUSH) 0.9 %
10 SYRINGE (ML) INJECTION PRN
Status: DISCONTINUED | OUTPATIENT
Start: 2019-12-06 | End: 2019-12-08 | Stop reason: HOSPADM

## 2019-12-06 RX ORDER — OMEPRAZOLE 20 MG/1
40 CAPSULE, DELAYED RELEASE ORAL
Status: DISCONTINUED | OUTPATIENT
Start: 2019-12-06 | End: 2019-12-06 | Stop reason: CLARIF

## 2019-12-06 RX ORDER — LABETALOL 100 MG/1
100 TABLET, FILM COATED ORAL EVERY 12 HOURS SCHEDULED
Status: DISCONTINUED | OUTPATIENT
Start: 2019-12-06 | End: 2019-12-08 | Stop reason: HOSPADM

## 2019-12-06 RX ORDER — ONDANSETRON 2 MG/ML
4 INJECTION INTRAMUSCULAR; INTRAVENOUS EVERY 6 HOURS PRN
Status: DISCONTINUED | OUTPATIENT
Start: 2019-12-06 | End: 2019-12-08 | Stop reason: HOSPADM

## 2019-12-06 RX ORDER — TAMSULOSIN HYDROCHLORIDE 0.4 MG/1
0.4 CAPSULE ORAL DAILY
Status: DISCONTINUED | OUTPATIENT
Start: 2019-12-06 | End: 2019-12-08 | Stop reason: HOSPADM

## 2019-12-06 RX ORDER — LISINOPRIL 10 MG/1
10 TABLET ORAL DAILY
Status: DISCONTINUED | OUTPATIENT
Start: 2019-12-06 | End: 2019-12-08 | Stop reason: HOSPADM

## 2019-12-06 RX ADMIN — LABETALOL HYDROCHLORIDE 100 MG: 100 TABLET, FILM COATED ORAL at 11:58

## 2019-12-06 RX ADMIN — LISINOPRIL 10 MG: 10 TABLET ORAL at 11:58

## 2019-12-06 RX ADMIN — METRONIDAZOLE 500 MG: 500 INJECTION, SOLUTION INTRAVENOUS at 18:22

## 2019-12-06 RX ADMIN — SODIUM CHLORIDE, PRESERVATIVE FREE 10 ML: 5 INJECTION INTRAVENOUS at 20:50

## 2019-12-06 RX ADMIN — PANTOPRAZOLE SODIUM 40 MG: 40 TABLET, DELAYED RELEASE ORAL at 08:55

## 2019-12-06 RX ADMIN — METRONIDAZOLE 500 MG: 500 INJECTION, SOLUTION INTRAVENOUS at 23:13

## 2019-12-06 RX ADMIN — METRONIDAZOLE 500 MG: 500 INJECTION, SOLUTION INTRAVENOUS at 08:55

## 2019-12-06 RX ADMIN — FINASTERIDE 5 MG: 5 TABLET, FILM COATED ORAL at 11:58

## 2019-12-06 RX ADMIN — CEFTRIAXONE 2 G: 2 INJECTION, POWDER, FOR SOLUTION INTRAMUSCULAR; INTRAVENOUS at 20:50

## 2019-12-06 RX ADMIN — TAMSULOSIN HYDROCHLORIDE 0.4 MG: 0.4 CAPSULE ORAL at 08:55

## 2019-12-06 RX ADMIN — AMLODIPINE BESYLATE 5 MG: 5 TABLET ORAL at 11:58

## 2019-12-06 RX ADMIN — RANOLAZINE 500 MG: 500 TABLET, FILM COATED, EXTENDED RELEASE ORAL at 12:00

## 2019-12-06 RX ADMIN — IPRATROPIUM BROMIDE AND ALBUTEROL SULFATE 1 AMPULE: 2.5; .5 SOLUTION RESPIRATORY (INHALATION) at 22:41

## 2019-12-06 RX ADMIN — IPRATROPIUM BROMIDE AND ALBUTEROL SULFATE 1 AMPULE: 2.5; .5 SOLUTION RESPIRATORY (INHALATION) at 13:29

## 2019-12-06 RX ADMIN — LABETALOL HYDROCHLORIDE 100 MG: 100 TABLET, FILM COATED ORAL at 20:50

## 2019-12-06 RX ADMIN — WARFARIN SODIUM 5 MG: 5 TABLET ORAL at 18:22

## 2019-12-06 RX ADMIN — SODIUM CHLORIDE, PRESERVATIVE FREE 10 ML: 5 INJECTION INTRAVENOUS at 08:56

## 2019-12-06 RX ADMIN — IPRATROPIUM BROMIDE AND ALBUTEROL SULFATE 1 AMPULE: 2.5; .5 SOLUTION RESPIRATORY (INHALATION) at 18:02

## 2019-12-06 ASSESSMENT — PAIN SCALES - GENERAL
PAINLEVEL_OUTOF10: 0

## 2019-12-07 LAB
ANION GAP SERPL CALCULATED.3IONS-SCNC: 13 MMOL/L (ref 7–16)
BASOPHILS ABSOLUTE: 0 E9/L (ref 0–0.2)
BASOPHILS RELATIVE PERCENT: 0.1 % (ref 0–2)
BUN BLDV-MCNC: 14 MG/DL (ref 8–23)
CALCIUM SERPL-MCNC: 8.6 MG/DL (ref 8.6–10.2)
CHLORIDE BLD-SCNC: 105 MMOL/L (ref 98–107)
CO2: 22 MMOL/L (ref 22–29)
CREAT SERPL-MCNC: 0.8 MG/DL (ref 0.7–1.2)
EOSINOPHILS ABSOLUTE: 0.15 E9/L (ref 0.05–0.5)
EOSINOPHILS RELATIVE PERCENT: 1.7 % (ref 0–6)
GFR AFRICAN AMERICAN: >60
GFR NON-AFRICAN AMERICAN: >60 ML/MIN/1.73
GLUCOSE BLD-MCNC: 92 MG/DL (ref 74–99)
HCT VFR BLD CALC: 37.9 % (ref 37–54)
HEMOGLOBIN: 11.9 G/DL (ref 12.5–16.5)
INR BLD: 3.8
LYMPHOCYTES ABSOLUTE: 0.09 E9/L (ref 1.5–4)
LYMPHOCYTES RELATIVE PERCENT: 0.9 % (ref 20–42)
MCH RBC QN AUTO: 31.9 PG (ref 26–35)
MCHC RBC AUTO-ENTMCNC: 31.4 % (ref 32–34.5)
MCV RBC AUTO: 101.6 FL (ref 80–99.9)
MONOCYTES ABSOLUTE: 0.34 E9/L (ref 0.1–0.95)
MONOCYTES RELATIVE PERCENT: 3.5 % (ref 2–12)
NEUTROPHILS ABSOLUTE: 8.08 E9/L (ref 1.8–7.3)
NEUTROPHILS RELATIVE PERCENT: 93.9 % (ref 43–80)
ORGANISM: ABNORMAL
OVALOCYTES: ABNORMAL
PDW BLD-RTO: 13.9 FL (ref 11.5–15)
PLATELET # BLD: 162 E9/L (ref 130–450)
PMV BLD AUTO: 9.9 FL (ref 7–12)
POIKILOCYTES: ABNORMAL
POTASSIUM SERPL-SCNC: 3.8 MMOL/L (ref 3.5–5)
PROTHROMBIN TIME: 44 SEC (ref 9.3–12.4)
RBC # BLD: 3.73 E12/L (ref 3.8–5.8)
SODIUM BLD-SCNC: 140 MMOL/L (ref 132–146)
URINE CULTURE, ROUTINE: ABNORMAL
WBC # BLD: 8.6 E9/L (ref 4.5–11.5)

## 2019-12-07 PROCEDURE — 1200000000 HC SEMI PRIVATE

## 2019-12-07 PROCEDURE — 2580000003 HC RX 258: Performed by: INTERNAL MEDICINE

## 2019-12-07 PROCEDURE — 85610 PROTHROMBIN TIME: CPT

## 2019-12-07 PROCEDURE — 36415 COLL VENOUS BLD VENIPUNCTURE: CPT

## 2019-12-07 PROCEDURE — 80048 BASIC METABOLIC PNL TOTAL CA: CPT

## 2019-12-07 PROCEDURE — 6360000002 HC RX W HCPCS: Performed by: SURGERY

## 2019-12-07 PROCEDURE — 2500000003 HC RX 250 WO HCPCS: Performed by: SURGERY

## 2019-12-07 PROCEDURE — 6370000000 HC RX 637 (ALT 250 FOR IP): Performed by: INTERNAL MEDICINE

## 2019-12-07 PROCEDURE — 99232 SBSQ HOSP IP/OBS MODERATE 35: CPT | Performed by: SURGERY

## 2019-12-07 PROCEDURE — 94640 AIRWAY INHALATION TREATMENT: CPT

## 2019-12-07 PROCEDURE — 2700000000 HC OXYGEN THERAPY PER DAY

## 2019-12-07 PROCEDURE — 2580000003 HC RX 258: Performed by: SURGERY

## 2019-12-07 PROCEDURE — 85025 COMPLETE CBC W/AUTO DIFF WBC: CPT

## 2019-12-07 RX ADMIN — IPRATROPIUM BROMIDE AND ALBUTEROL SULFATE 1 AMPULE: 2.5; .5 SOLUTION RESPIRATORY (INHALATION) at 11:35

## 2019-12-07 RX ADMIN — PANTOPRAZOLE SODIUM 40 MG: 40 TABLET, DELAYED RELEASE ORAL at 06:47

## 2019-12-07 RX ADMIN — LABETALOL HYDROCHLORIDE 100 MG: 100 TABLET, FILM COATED ORAL at 20:44

## 2019-12-07 RX ADMIN — LABETALOL HYDROCHLORIDE 100 MG: 100 TABLET, FILM COATED ORAL at 09:34

## 2019-12-07 RX ADMIN — SODIUM CHLORIDE, PRESERVATIVE FREE 10 ML: 5 INJECTION INTRAVENOUS at 16:02

## 2019-12-07 RX ADMIN — IPRATROPIUM BROMIDE AND ALBUTEROL SULFATE 1 AMPULE: 2.5; .5 SOLUTION RESPIRATORY (INHALATION) at 15:59

## 2019-12-07 RX ADMIN — IPRATROPIUM BROMIDE AND ALBUTEROL SULFATE 1 AMPULE: 2.5; .5 SOLUTION RESPIRATORY (INHALATION) at 19:43

## 2019-12-07 RX ADMIN — IPRATROPIUM BROMIDE AND ALBUTEROL SULFATE 1 AMPULE: 2.5; .5 SOLUTION RESPIRATORY (INHALATION) at 07:48

## 2019-12-07 RX ADMIN — METRONIDAZOLE 500 MG: 500 INJECTION, SOLUTION INTRAVENOUS at 06:47

## 2019-12-07 RX ADMIN — AMLODIPINE BESYLATE 5 MG: 5 TABLET ORAL at 09:34

## 2019-12-07 RX ADMIN — CEFTRIAXONE 2 G: 2 INJECTION, POWDER, FOR SOLUTION INTRAMUSCULAR; INTRAVENOUS at 20:38

## 2019-12-07 RX ADMIN — TAMSULOSIN HYDROCHLORIDE 0.4 MG: 0.4 CAPSULE ORAL at 09:33

## 2019-12-07 RX ADMIN — METRONIDAZOLE 500 MG: 500 INJECTION, SOLUTION INTRAVENOUS at 16:02

## 2019-12-07 RX ADMIN — METRONIDAZOLE 500 MG: 500 INJECTION, SOLUTION INTRAVENOUS at 23:04

## 2019-12-07 RX ADMIN — LISINOPRIL 10 MG: 10 TABLET ORAL at 09:34

## 2019-12-07 RX ADMIN — SODIUM CHLORIDE, PRESERVATIVE FREE 10 ML: 5 INJECTION INTRAVENOUS at 20:43

## 2019-12-07 RX ADMIN — RANOLAZINE 500 MG: 500 TABLET, FILM COATED, EXTENDED RELEASE ORAL at 09:34

## 2019-12-07 ASSESSMENT — PAIN SCALES - GENERAL
PAINLEVEL_OUTOF10: 0
PAINLEVEL_OUTOF10: 0

## 2019-12-08 VITALS
OXYGEN SATURATION: 93 % | SYSTOLIC BLOOD PRESSURE: 134 MMHG | RESPIRATION RATE: 17 BRPM | WEIGHT: 155 LBS | HEIGHT: 68 IN | DIASTOLIC BLOOD PRESSURE: 72 MMHG | TEMPERATURE: 99.7 F | HEART RATE: 77 BPM | BODY MASS INDEX: 23.49 KG/M2

## 2019-12-08 LAB
ANION GAP SERPL CALCULATED.3IONS-SCNC: 17 MMOL/L (ref 7–16)
BASOPHILS ABSOLUTE: 0.02 E9/L (ref 0–0.2)
BASOPHILS RELATIVE PERCENT: 0.3 % (ref 0–2)
BUN BLDV-MCNC: 11 MG/DL (ref 8–23)
BURR CELLS: ABNORMAL
CALCIUM SERPL-MCNC: 8.4 MG/DL (ref 8.6–10.2)
CHLORIDE BLD-SCNC: 108 MMOL/L (ref 98–107)
CO2: 19 MMOL/L (ref 22–29)
CREAT SERPL-MCNC: 0.8 MG/DL (ref 0.7–1.2)
EOSINOPHILS ABSOLUTE: 0.14 E9/L (ref 0.05–0.5)
EOSINOPHILS RELATIVE PERCENT: 2.3 % (ref 0–6)
GFR AFRICAN AMERICAN: >60
GFR NON-AFRICAN AMERICAN: >60 ML/MIN/1.73
GLUCOSE BLD-MCNC: 84 MG/DL (ref 74–99)
HCT VFR BLD CALC: 37.1 % (ref 37–54)
HEMOGLOBIN: 11.6 G/DL (ref 12.5–16.5)
IMMATURE GRANULOCYTES #: 0.02 E9/L
IMMATURE GRANULOCYTES %: 0.3 % (ref 0–5)
INR BLD: 3.6
LYMPHOCYTES ABSOLUTE: 0.38 E9/L (ref 1.5–4)
LYMPHOCYTES RELATIVE PERCENT: 6.3 % (ref 20–42)
MCH RBC QN AUTO: 31.7 PG (ref 26–35)
MCHC RBC AUTO-ENTMCNC: 31.3 % (ref 32–34.5)
MCV RBC AUTO: 101.4 FL (ref 80–99.9)
MONOCYTES ABSOLUTE: 0.55 E9/L (ref 0.1–0.95)
MONOCYTES RELATIVE PERCENT: 9.2 % (ref 2–12)
NEUTROPHILS ABSOLUTE: 4.88 E9/L (ref 1.8–7.3)
NEUTROPHILS RELATIVE PERCENT: 81.6 % (ref 43–80)
OVALOCYTES: ABNORMAL
PDW BLD-RTO: 13.8 FL (ref 11.5–15)
PLATELET # BLD: 155 E9/L (ref 130–450)
PMV BLD AUTO: 9.9 FL (ref 7–12)
POIKILOCYTES: ABNORMAL
POTASSIUM SERPL-SCNC: 3.4 MMOL/L (ref 3.5–5)
PROTHROMBIN TIME: 42.1 SEC (ref 9.3–12.4)
RBC # BLD: 3.66 E12/L (ref 3.8–5.8)
SCHISTOCYTES: ABNORMAL
SODIUM BLD-SCNC: 144 MMOL/L (ref 132–146)
WBC # BLD: 6 E9/L (ref 4.5–11.5)

## 2019-12-08 PROCEDURE — 6370000000 HC RX 637 (ALT 250 FOR IP): Performed by: INTERNAL MEDICINE

## 2019-12-08 PROCEDURE — 2500000003 HC RX 250 WO HCPCS: Performed by: SURGERY

## 2019-12-08 PROCEDURE — 85025 COMPLETE CBC W/AUTO DIFF WBC: CPT

## 2019-12-08 PROCEDURE — 80048 BASIC METABOLIC PNL TOTAL CA: CPT

## 2019-12-08 PROCEDURE — 94640 AIRWAY INHALATION TREATMENT: CPT

## 2019-12-08 PROCEDURE — 6360000002 HC RX W HCPCS: Performed by: INTERNAL MEDICINE

## 2019-12-08 PROCEDURE — 2700000000 HC OXYGEN THERAPY PER DAY

## 2019-12-08 PROCEDURE — 99232 SBSQ HOSP IP/OBS MODERATE 35: CPT | Performed by: SURGERY

## 2019-12-08 PROCEDURE — 2580000003 HC RX 258: Performed by: INTERNAL MEDICINE

## 2019-12-08 PROCEDURE — 36415 COLL VENOUS BLD VENIPUNCTURE: CPT

## 2019-12-08 PROCEDURE — 85610 PROTHROMBIN TIME: CPT

## 2019-12-08 RX ORDER — LISINOPRIL 10 MG/1
10 TABLET ORAL DAILY
Qty: 30 TABLET | Refills: 3 | Status: SHIPPED | OUTPATIENT
Start: 2019-12-09 | End: 2020-07-08

## 2019-12-08 RX ORDER — METRONIDAZOLE 500 MG/1
500 TABLET ORAL EVERY 8 HOURS SCHEDULED
Status: DISCONTINUED | OUTPATIENT
Start: 2019-12-08 | End: 2019-12-08 | Stop reason: HOSPADM

## 2019-12-08 RX ORDER — METRONIDAZOLE 500 MG/1
500 TABLET ORAL 3 TIMES DAILY
Qty: 30 TABLET | Refills: 0 | Status: SHIPPED | OUTPATIENT
Start: 2019-12-08 | End: 2019-12-18

## 2019-12-08 RX ORDER — POTASSIUM CHLORIDE 7.45 MG/ML
10 INJECTION INTRAVENOUS
Status: COMPLETED | OUTPATIENT
Start: 2019-12-08 | End: 2019-12-08

## 2019-12-08 RX ORDER — CEFDINIR 300 MG/1
300 CAPSULE ORAL 2 TIMES DAILY
Qty: 20 CAPSULE | Refills: 0 | Status: SHIPPED | OUTPATIENT
Start: 2019-12-08 | End: 2019-12-18

## 2019-12-08 RX ADMIN — LISINOPRIL 10 MG: 10 TABLET ORAL at 08:46

## 2019-12-08 RX ADMIN — METRONIDAZOLE 500 MG: 500 INJECTION, SOLUTION INTRAVENOUS at 07:13

## 2019-12-08 RX ADMIN — SODIUM CHLORIDE, PRESERVATIVE FREE 10 ML: 5 INJECTION INTRAVENOUS at 12:16

## 2019-12-08 RX ADMIN — IPRATROPIUM BROMIDE AND ALBUTEROL SULFATE 1 AMPULE: 2.5; .5 SOLUTION RESPIRATORY (INHALATION) at 11:11

## 2019-12-08 RX ADMIN — PANTOPRAZOLE SODIUM 40 MG: 40 TABLET, DELAYED RELEASE ORAL at 06:49

## 2019-12-08 RX ADMIN — METRONIDAZOLE 500 MG: 500 TABLET, FILM COATED ORAL at 17:06

## 2019-12-08 RX ADMIN — AMLODIPINE BESYLATE 5 MG: 5 TABLET ORAL at 08:46

## 2019-12-08 RX ADMIN — SODIUM CHLORIDE, PRESERVATIVE FREE 10 ML: 5 INJECTION INTRAVENOUS at 08:47

## 2019-12-08 RX ADMIN — LABETALOL HYDROCHLORIDE 100 MG: 100 TABLET, FILM COATED ORAL at 08:46

## 2019-12-08 RX ADMIN — IPRATROPIUM BROMIDE AND ALBUTEROL SULFATE 1 AMPULE: 2.5; .5 SOLUTION RESPIRATORY (INHALATION) at 07:27

## 2019-12-08 RX ADMIN — RANOLAZINE 500 MG: 500 TABLET, FILM COATED, EXTENDED RELEASE ORAL at 08:46

## 2019-12-08 RX ADMIN — TAMSULOSIN HYDROCHLORIDE 0.4 MG: 0.4 CAPSULE ORAL at 08:46

## 2019-12-08 RX ADMIN — POTASSIUM CHLORIDE 10 MEQ: 7.46 INJECTION, SOLUTION INTRAVENOUS at 12:16

## 2019-12-08 RX ADMIN — POTASSIUM CHLORIDE 10 MEQ: 7.46 INJECTION, SOLUTION INTRAVENOUS at 13:15

## 2019-12-08 ASSESSMENT — PAIN SCALES - GENERAL: PAINLEVEL_OUTOF10: 0

## 2019-12-18 ENCOUNTER — PROCEDURE VISIT (OUTPATIENT)
Dept: PODIATRY | Age: 79
End: 2019-12-18
Payer: MEDICARE

## 2019-12-18 VITALS
WEIGHT: 151 LBS | HEIGHT: 68 IN | BODY MASS INDEX: 22.88 KG/M2 | SYSTOLIC BLOOD PRESSURE: 112 MMHG | TEMPERATURE: 97.9 F | DIASTOLIC BLOOD PRESSURE: 62 MMHG

## 2019-12-18 DIAGNOSIS — B35.1 TINEA UNGUIUM: Primary | ICD-10-CM

## 2019-12-18 DIAGNOSIS — R26.2 DIFFICULTY WALKING: ICD-10-CM

## 2019-12-18 DIAGNOSIS — M79.675 PAIN IN LEFT TOE(S): ICD-10-CM

## 2019-12-18 DIAGNOSIS — I73.9 PERIPHERAL VASCULAR DISEASE, UNSPECIFIED (HCC): ICD-10-CM

## 2019-12-18 DIAGNOSIS — M79.674 PAIN IN TOE OF RIGHT FOOT: ICD-10-CM

## 2019-12-18 PROCEDURE — 11721 DEBRIDE NAIL 6 OR MORE: CPT | Performed by: PODIATRIST

## 2019-12-18 ASSESSMENT — ENCOUNTER SYMPTOMS
EYES NEGATIVE: 1
RESPIRATORY NEGATIVE: 1

## 2019-12-30 ENCOUNTER — HOSPITAL ENCOUNTER (OUTPATIENT)
Age: 79
Discharge: HOME | End: 2019-12-30
Payer: MEDICARE

## 2019-12-30 PROCEDURE — 87449 NOS EACH ORGANISM AG IA: CPT

## 2019-12-30 PROCEDURE — 87324 CLOSTRIDIUM AG IA: CPT

## 2019-12-31 LAB — C DIFF TOXIN/ANTIGEN: ABNORMAL

## 2020-01-01 ENCOUNTER — NURSE ONLY (OUTPATIENT)
Dept: GERIATRIC MEDICINE | Age: 80
End: 2020-01-01
Payer: MEDICARE

## 2020-01-01 ENCOUNTER — HOSPITAL ENCOUNTER (OUTPATIENT)
Dept: GENERAL RADIOLOGY | Age: 80
Discharge: HOME | End: 2021-01-01
Payer: MEDICARE

## 2020-01-01 ENCOUNTER — TELEPHONE (OUTPATIENT)
Dept: GERIATRIC MEDICINE | Age: 80
End: 2020-01-01

## 2020-01-01 ENCOUNTER — ANTI-COAG VISIT (OUTPATIENT)
Dept: GERIATRIC MEDICINE | Age: 80
End: 2020-01-01

## 2020-01-01 ENCOUNTER — OFFICE VISIT (OUTPATIENT)
Dept: GERIATRIC MEDICINE | Age: 80
End: 2020-01-01
Payer: MEDICARE

## 2020-01-01 ENCOUNTER — PROCEDURE VISIT (OUTPATIENT)
Dept: PODIATRY | Age: 80
End: 2020-01-01
Payer: MEDICARE

## 2020-01-01 ENCOUNTER — HOSPITAL ENCOUNTER (OUTPATIENT)
Age: 80
Discharge: HOME | End: 2020-10-09
Payer: MEDICARE

## 2020-01-01 VITALS — TEMPERATURE: 96.9 F

## 2020-01-01 VITALS
BODY MASS INDEX: 25.99 KG/M2 | TEMPERATURE: 97.6 F | DIASTOLIC BLOOD PRESSURE: 78 MMHG | SYSTOLIC BLOOD PRESSURE: 122 MMHG | WEIGHT: 161 LBS

## 2020-01-01 VITALS
DIASTOLIC BLOOD PRESSURE: 56 MMHG | HEART RATE: 84 BPM | OXYGEN SATURATION: 91 % | BODY MASS INDEX: 25.86 KG/M2 | WEIGHT: 160.2 LBS | SYSTOLIC BLOOD PRESSURE: 94 MMHG | RESPIRATION RATE: 22 BRPM | TEMPERATURE: 97.9 F

## 2020-01-01 VITALS — TEMPERATURE: 96.8 F

## 2020-01-01 DIAGNOSIS — F02.80 LATE ONSET ALZHEIMER'S DISEASE WITHOUT BEHAVIORAL DISTURBANCE (HCC): ICD-10-CM

## 2020-01-01 DIAGNOSIS — R06.02 SHORT OF BREATH ON EXERTION: ICD-10-CM

## 2020-01-01 DIAGNOSIS — I42.1 HYPERTROPHIC OBSTRUCTIVE CARDIOMYOPATHY (HCC): ICD-10-CM

## 2020-01-01 DIAGNOSIS — E78.2 MIXED HYPERLIPIDEMIA: ICD-10-CM

## 2020-01-01 DIAGNOSIS — F02.80 LATE ONSET ALZHEIMER'S DISEASE WITHOUT BEHAVIORAL DISTURBANCE (HCC): Primary | ICD-10-CM

## 2020-01-01 DIAGNOSIS — O22.30 DVT (DEEP VEIN THROMBOSIS) IN PREGNANCY: ICD-10-CM

## 2020-01-01 DIAGNOSIS — R53.83 TIRED: ICD-10-CM

## 2020-01-01 DIAGNOSIS — I10 ESSENTIAL HYPERTENSION: ICD-10-CM

## 2020-01-01 DIAGNOSIS — G30.1 LATE ONSET ALZHEIMER'S DISEASE WITHOUT BEHAVIORAL DISTURBANCE (HCC): Primary | ICD-10-CM

## 2020-01-01 DIAGNOSIS — Z79.01 CURRENT USE OF LONG TERM ANTICOAGULATION: ICD-10-CM

## 2020-01-01 DIAGNOSIS — G30.1 LATE ONSET ALZHEIMER'S DISEASE WITHOUT BEHAVIORAL DISTURBANCE (HCC): ICD-10-CM

## 2020-01-01 DIAGNOSIS — I82.412 DEEP VEIN THROMBOSIS (DVT) OF FEMORAL VEIN OF LEFT LOWER EXTREMITY, UNSPECIFIED CHRONICITY (HCC): ICD-10-CM

## 2020-01-01 LAB
ALBUMIN SERPL-MCNC: 4.1 G/DL (ref 3.5–5.2)
ALP BLD-CCNC: 64 U/L (ref 40–129)
ALT SERPL-CCNC: 16 U/L (ref 0–40)
ANION GAP SERPL CALCULATED.3IONS-SCNC: 15 MMOL/L (ref 7–16)
AST SERPL-CCNC: 17 U/L (ref 0–39)
BILIRUB SERPL-MCNC: 0.6 MG/DL (ref 0–1.2)
BUN BLDV-MCNC: 20 MG/DL (ref 8–23)
CALCIUM SERPL-MCNC: 9.5 MG/DL (ref 8.6–10.2)
CHLORIDE BLD-SCNC: 107 MMOL/L (ref 98–107)
CHOLESTEROL, TOTAL: 135 MG/DL (ref 0–199)
CO2: 20 MMOL/L (ref 22–29)
CREAT SERPL-MCNC: 1 MG/DL (ref 0.7–1.2)
GFR AFRICAN AMERICAN: >60
GFR NON-AFRICAN AMERICAN: >60 ML/MIN/1.73
GLUCOSE BLD-MCNC: 112 MG/DL (ref 74–99)
HCT VFR BLD CALC: 39.4 % (ref 37–54)
HDLC SERPL-MCNC: 57 MG/DL
HEMOGLOBIN: 13 G/DL (ref 12.5–16.5)
INR BLD: 3.1
INR BLD: 3.4
INR BLD: 3.8
INR BLD: 3.9
LDL CHOLESTEROL CALCULATED: 48 MG/DL (ref 0–99)
MCH RBC QN AUTO: 32.3 PG (ref 26–35)
MCHC RBC AUTO-ENTMCNC: 33 % (ref 32–34.5)
MCV RBC AUTO: 97.8 FL (ref 80–99.9)
PDW BLD-RTO: 14.3 FL (ref 11.5–15)
PLATELET # BLD: 190 E9/L (ref 130–450)
PMV BLD AUTO: 9.4 FL (ref 7–12)
POTASSIUM SERPL-SCNC: 4.8 MMOL/L (ref 3.5–5)
PRO-BNP: 968 PG/ML (ref 0–450)
PROSTATE SPECIFIC ANTIGEN: 2.88 NG/ML (ref 0–4)
PROTHROMBIN TIME: 35.1 SEC (ref 9.3–12.4)
PROTHROMBIN TIME: 38.8 SEC (ref 9.3–12.4)
PROTHROMBIN TIME: 43.9 SEC (ref 9.3–12.4)
PROTHROMBIN TIME: 45.2 SEC (ref 9.3–12.4)
RBC # BLD: 4.03 E12/L (ref 3.8–5.8)
SODIUM BLD-SCNC: 142 MMOL/L (ref 132–146)
TOTAL PROTEIN: 6.5 G/DL (ref 6.4–8.3)
TRIGL SERPL-MCNC: 151 MG/DL (ref 0–149)
TSH SERPL DL<=0.05 MIU/L-ACNC: 3.01 UIU/ML (ref 0.27–4.2)
VLDLC SERPL CALC-MCNC: 30 MG/DL
WBC # BLD: 8.2 E9/L (ref 4.5–11.5)

## 2020-01-01 PROCEDURE — 6360000002 HC RX W HCPCS

## 2020-01-01 PROCEDURE — 1036F TOBACCO NON-USER: CPT | Performed by: INTERNAL MEDICINE

## 2020-01-01 PROCEDURE — 36415 COLL VENOUS BLD VENIPUNCTURE: CPT | Performed by: INTERNAL MEDICINE

## 2020-01-01 PROCEDURE — 4040F PNEUMOC VAC/ADMIN/RCVD: CPT | Performed by: INTERNAL MEDICINE

## 2020-01-01 PROCEDURE — 71046 X-RAY EXAM CHEST 2 VIEWS: CPT

## 2020-01-01 PROCEDURE — G0008 ADMIN INFLUENZA VIRUS VAC: HCPCS | Performed by: INTERNAL MEDICINE

## 2020-01-01 PROCEDURE — 11721 DEBRIDE NAIL 6 OR MORE: CPT | Performed by: PODIATRIST

## 2020-01-01 PROCEDURE — G8427 DOCREV CUR MEDS BY ELIG CLIN: HCPCS | Performed by: INTERNAL MEDICINE

## 2020-01-01 PROCEDURE — 85610 PROTHROMBIN TIME: CPT

## 2020-01-01 PROCEDURE — 99212 OFFICE O/P EST SF 10 MIN: CPT | Performed by: INTERNAL MEDICINE

## 2020-01-01 PROCEDURE — G8417 CALC BMI ABV UP PARAM F/U: HCPCS | Performed by: INTERNAL MEDICINE

## 2020-01-01 PROCEDURE — 1123F ACP DISCUSS/DSCN MKR DOCD: CPT | Performed by: INTERNAL MEDICINE

## 2020-01-01 PROCEDURE — G8484 FLU IMMUNIZE NO ADMIN: HCPCS | Performed by: INTERNAL MEDICINE

## 2020-01-01 PROCEDURE — G0103 PSA SCREENING: HCPCS

## 2020-01-01 PROCEDURE — 90694 VACC AIIV4 NO PRSRV 0.5ML IM: CPT

## 2020-01-01 PROCEDURE — 90694 VACC AIIV4 NO PRSRV 0.5ML IM: CPT | Performed by: INTERNAL MEDICINE

## 2020-01-01 PROCEDURE — G0008 ADMIN INFLUENZA VIRUS VAC: HCPCS

## 2020-01-01 PROCEDURE — 36415 COLL VENOUS BLD VENIPUNCTURE: CPT

## 2020-01-01 RX ORDER — CYANOCOBALAMIN 1000 UG/ML
1000 INJECTION INTRAMUSCULAR; SUBCUTANEOUS ONCE
Status: COMPLETED | OUTPATIENT
Start: 2020-01-01 | End: 2020-01-01

## 2020-01-01 RX ADMIN — CYANOCOBALAMIN 1000 MCG: 1000 INJECTION INTRAMUSCULAR; SUBCUTANEOUS at 10:44

## 2020-01-01 RX ADMIN — CYANOCOBALAMIN 1000 MCG: 1000 INJECTION INTRAMUSCULAR; SUBCUTANEOUS at 16:22

## 2020-01-01 RX ADMIN — CYANOCOBALAMIN 1000 MCG: 1000 INJECTION INTRAMUSCULAR; SUBCUTANEOUS at 10:20

## 2020-01-01 ASSESSMENT — ENCOUNTER SYMPTOMS
RESPIRATORY NEGATIVE: 1
EYES NEGATIVE: 1

## 2020-02-26 ENCOUNTER — PROCEDURE VISIT (OUTPATIENT)
Dept: PODIATRY | Age: 80
End: 2020-02-26
Payer: MEDICARE

## 2020-02-26 VITALS
HEIGHT: 68 IN | DIASTOLIC BLOOD PRESSURE: 82 MMHG | BODY MASS INDEX: 23.04 KG/M2 | TEMPERATURE: 98.2 F | SYSTOLIC BLOOD PRESSURE: 126 MMHG | WEIGHT: 152 LBS

## 2020-02-26 PROCEDURE — 11721 DEBRIDE NAIL 6 OR MORE: CPT | Performed by: PODIATRIST

## 2020-02-26 ASSESSMENT — ENCOUNTER SYMPTOMS
EYES NEGATIVE: 1
RESPIRATORY NEGATIVE: 1

## 2020-02-26 NOTE — PROGRESS NOTES
daily Indications: 3mg alternalting 5mg       acetaminophen (TYLENOL) 500 MG tablet Take 500 mg by mouth as needed for Pain \"Seldom takes\".  donepezil (ARICEPT) 10 MG tablet Take 20 mg by mouth daily (with breakfast)      memantine (NAMENDA XR) 28 MG CP24 capsule Take 28 mg by mouth daily      labetalol (NORMODYNE) 100 MG tablet Take 1 tablet by mouth every 12 hours. (Patient taking differently: Take 100 mg by mouth every 12 hours Indications: 1/2 tablet daily ) 60 tablet 2    amLODIPine (NORVASC) 5 MG tablet Take 1 tablet by mouth daily. 30 tablet 2    omeprazole (PRILOSEC) 40 MG capsule Take 40 mg by mouth every morning (before breakfast)       Cholecalciferol (VITAMIN D3) 2000 UNITS CAPS Take 2,000 Units by mouth daily.  nitroGLYCERIN (NITROSTAT) 0.4 MG SL tablet Place 0.4 mg under the tongue every 5 minutes as needed.  rosuvastatin (CRESTOR) 10 MG tablet Take 10 mg by mouth daily. No current facility-administered medications on file prior to visit. Review of Systems   Constitutional: Negative. HENT: Negative. Eyes: Negative. Respiratory: Negative. Endocrine: Negative. Genitourinary: Negative. Objective:  General: AAO x 3 in NAD.     Derm  Toenail Description  Sites of Onychomycosis Involvement (Check affected area)  [x] [x] [x] [x] [x] [x] [x] [x] [x] [x]  5 4 3 2 1 1 2 3 4 5                          Right                                        Left    Thickness  [x] [x] [x] [x] [x] [x] [x] [x] [x] [x]  5 4 3 2 1 1 2 3 4 5                         Right                                        Left    Dystrophic Changes   [x] [x] [x] [x] [x] [x] [x] [x] [x] [x]  5 4 3 2 1 1 2 3 4 5                         Right                                        Left    Color  [x] [x] [x] [x] [x] [x] [x] [x] [x] [x]  5 4 3 2 1 1 2 3 4 5                          Right                                        Left    Incurvation/Ingrowin pedis: absent  Posterior tibial: absent  Saphenous nerve sensation: normal  Tibial nerve sensation: normal  Superficial peroneal nerve sensation: normal  Deep peroneal nerve sensation: normal  Sural nerve sensation: normal  Achilles reflex: 2+  Babinski reflex: 2+             Right Ankle Exam     Muscle Strength   Dorsiflexion:  3/5  Plantar flexion:  3/5          Q7   []Yes    []No                Q8   [x]Yes    []No                     Q9   []Yes    []No  Assessment:  79 y.o. male with:   1. Tinea unguium    2. Pain in left toe(s)    3. Pain in toe of right foot    4. Peripheral vascular disease, unspecified ([facility] [address] [facility]    5. Difficulty walking     No orders of the defined types were placed in this encounter. Plan:   Pt was evaluated and examined. Patient was given personalized discharge instructions. Nails 1-10 were debrided in length and thickness sharply with a nail nipper and  without incident. Pt will follow up in 9 weeks or sooner if any problems arise. Diagnosis was discussed with the pt and all of their questions were answered in detail. Proper foot hygiene and care was discussed with the pt. Patient to check feet daily and contact the office with any questions/problems/concerns. Other comorbidity noted and will be managed by PCP. Pain waiver discussed with patient and confirmed.    2/26/2020      Electronically signed by Beverly Ferro DPM on 2/26/2020 at 2:59 PM  2/26/2020

## 2020-05-12 ENCOUNTER — PROCEDURE VISIT (OUTPATIENT)
Dept: PODIATRY | Age: 80
End: 2020-05-12
Payer: MEDICARE

## 2020-05-12 VITALS
DIASTOLIC BLOOD PRESSURE: 84 MMHG | HEIGHT: 66 IN | WEIGHT: 156 LBS | TEMPERATURE: 98.4 F | SYSTOLIC BLOOD PRESSURE: 133 MMHG | BODY MASS INDEX: 25.07 KG/M2

## 2020-05-12 PROCEDURE — 11721 DEBRIDE NAIL 6 OR MORE: CPT | Performed by: PODIATRIST

## 2020-05-12 ASSESSMENT — ENCOUNTER SYMPTOMS
EYES NEGATIVE: 1
RESPIRATORY NEGATIVE: 1

## 2020-05-12 NOTE — PROGRESS NOTES
Chanelle Stanford  Return Patient    Chief Complaint   Patient presents with    Toe Pain     saw PCP Dr. Lolita Booth on 12/6/2019       Subjective: This [facility] [place] comes to clinic for foot and nail care. Pt currently has complaint of thickened, painful, elongated nails that he/she cannot manage by themselves. Pt. Relates pain to nails with shoe gear. Pt's primary care physician is Ady Espinoza MD.  Past Medical History:   Diagnosis Date    Acute myocardial infarction, unspecified site 1995    Myocardial Infarction    Alzheimer's disease ([facility] [address]     Anemia associated with acute blood loss 4/11/2013    BPH associated with nocturia 4/9/2013    CAD (coronary artery disease), native coronary artery 4/9/2013    DVT, lower extremity, distal ([facility] [address] 4/9/2013    HTN, goal below 130/80 4/9/2013    Hx of blood clots     Hyperlipidemia LDL goal < 70 4/9/2013    Pulmonary emboli ([facility] [address] 4/9/2013    Retroperitoneal hematoma 4/11/2013    Shingles 2008       Allergies   Allergen Reactions    Heparin Other (See Comments)     \"Reaction\" to heparin per wife. Retroperitoneal hematoma.  Tape Timmy Dotter Tape] Other (See Comments)     Skin tears.     Tape Timmy Dotter Tape] Rash     Current Outpatient Medications on File Prior to Visit   Medication Sig Dispense Refill    lisinopril (PRINIVIL;ZESTRIL) 10 MG tablet Take 1 tablet by mouth daily 30 tablet 3    loratadine (CLARITIN) 10 MG tablet       ranolazine (RANEXA) 500 MG extended release tablet Take 500 mg by mouth daily      finasteride (PROSCAR) 5 MG tablet Take 5 mg by mouth three times a week On Monday, Wednesday, and Friday      tamsulosin (FLOMAX) 0.4 MG capsule Take 0.4 mg by mouth daily      cyanocobalamin 1000 MCG/ML injection Inject 1,000 mcg into the muscle every 30 days      magnesium hydroxide (MILK OF MAGNESIA) 400 MG/5ML suspension Take by mouth daily as needed for Constipation      warfarin (COUMADIN) 5 MG tablet Take 3 mg by mouth daily Indications: 3mg alternalting 5mg       acetaminophen (TYLENOL) 500 MG tablet Take 500 mg by mouth as needed for Pain \"Seldom takes\".  donepezil (ARICEPT) 10 MG tablet Take 20 mg by mouth daily (with breakfast)      memantine (NAMENDA XR) 28 MG CP24 capsule Take 28 mg by mouth daily      labetalol (NORMODYNE) 100 MG tablet Take 1 tablet by mouth every 12 hours. (Patient taking differently: Take 100 mg by mouth every 12 hours Indications: 1/2 tablet daily ) 60 tablet 2    amLODIPine (NORVASC) 5 MG tablet Take 1 tablet by mouth daily. 30 tablet 2    omeprazole (PRILOSEC) 40 MG capsule Take 40 mg by mouth every morning (before breakfast)       Cholecalciferol (VITAMIN D3) 2000 UNITS CAPS Take 2,000 Units by mouth daily.  nitroGLYCERIN (NITROSTAT) 0.4 MG SL tablet Place 0.4 mg under the tongue every 5 minutes as needed.  rosuvastatin (CRESTOR) 10 MG tablet Take 10 mg by mouth daily. No current facility-administered medications on file prior to visit. Review of Systems   Constitutional: Negative. HENT: Negative. Eyes: Negative. Respiratory: Negative. Endocrine: Negative. Genitourinary: Negative. Objective:  General: AAO x 3 in NAD.     Derm  Toenail Description  Sites of Onychomycosis Involvement (Check affected area)  [x] [x] [x] [x] [x] [x] [x] [x] [x] [x]  5 4 3 2 1 1 2 3 4 5                          Right                                        Left    Thickness  [x] [x] [x] [x] [x] [x] [x] [x] [x] [x]  5 4 3 2 1 1 2 3 4 5                         Right                                        Left    Dystrophic Changes   [x] [x] [x] [x] [x] [x] [x] [x] [x] [x]  5 4 3 2 1 1 2 3 4 5                         Right                                        Left    Color  [x] [x] [x] [x] [x] [x] [x] [x] [x] [x]  5 4 3 2 1 1 2 3 4 5                          Right                                        Left    Incurvation/Ingrowin [x] [x] [x] [x] [x] [x] [x] [x] [x] [x]  5 4 3 2 1 1 2 3 4 5                         Right                                        Left    Inflammation/Pain   [x] [x] [x] [x] [x] [x] [x] [x] [x] [x]  5 4 3  2 1 1 2 3 4 5                         Right                                        Left      Nails that are described above are all elongated thickened pitting mycotic yellowish incurvated causing pain with both shoe gear. Palpation nails greater then 3 mm thick painful       Dermatologic Exam:hair loss noted  lower extremity    Skin lesion/ulceration   Skin   Callus   Musculoskeletal:     1st MPJ ROM normal, Bilateral.  Muscle strength 5/5, Bilateral.  Pain present upon palpation of toenails 1-5   Bilateral., Bilateral.  Ankle ROM normal,Bilateral.    Dorsally contracted digits, Bilateral.     Vascular:  Pulses   bilateral DP     PT absnet    Neurological:  Sensation present to light touch to level of digits, Bilateral.    Foot Exam    General  General Appearance: appears stated age and healthy   Orientation: alert and oriented to person, place, and time   Assistance: walker use       Right Foot/Ankle     Inspection and Palpation  Ecchymosis: none  Tenderness: none   Swelling: dorsum   Arch: pes planus  Skin Exam: skin changes and abnormal color; Neurovascular  Dorsalis pedis: absent  Posterior tibial: absent  Saphenous nerve sensation: normal  Tibial nerve sensation: normal  Superficial peroneal nerve sensation: normal  Deep peroneal nerve sensation: normal  Sural nerve sensation: normal  Achilles reflex: 2+  Babinski reflex: 2+    Muscle Strength  Ankle dorsiflexion: 3  Ankle plantar flexion: 3  Ankle inversion: 3  Great toe extension: 3  Great toe flexion: 3    Tests  Anterior drawer: negative   Varus tilt: negative   Syndesmosis squeeze: negative       Left Foot/Ankle      Inspection and Palpation  Ecchymosis: none  Tenderness: none   Skin Exam: skin changes and abnormal color;      Neurovascular  Dorsalis pedis: absent  Posterior tibial: absent  Saphenous nerve sensation: normal  Tibial nerve sensation: normal  Superficial peroneal nerve sensation: normal  Deep peroneal nerve sensation: normal  Sural nerve sensation: normal  Achilles reflex: 2+  Babinski reflex: 2+             Right Ankle Exam     Muscle Strength   Dorsiflexion:  3/5  Plantar flexion:  3/5          Q7   []Yes    []No                Q8   [x]Yes    []No                     Q9   []Yes    []No  Assessment:  79 y.o. male with:   1. Tinea unguium    2. Pain in left toe(s)    3. Pain in toe of right foot    4. Peripheral vascular disease, unspecified ([facility] [address]    5. Difficulty walking     No orders of the defined types were placed in this encounter. Plan:   Pt was evaluated and examined. Patient was given personalized discharge instructions. Nails 1-10 were debrided in length and thickness sharply with a nail nipper and  without incident. Pt will follow up in 9 weeks or sooner if any problems arise. Diagnosis was discussed with the pt and all of their questions were answered in detail. Proper foot hygiene and care was discussed with the pt. Patient to check feet daily and contact the office with any questions/problems/concerns. Other comorbidity noted and will be managed by PCP. Pain waiver discussed with patient and confirmed.    5/12/2020      Electronically signed by Karma Solano DPM on 5/12/2020 at 2:41 PM  5/12/2020

## 2020-07-08 ENCOUNTER — OFFICE VISIT (OUTPATIENT)
Dept: GERIATRIC MEDICINE | Age: 80
End: 2020-07-08
Payer: MEDICARE

## 2020-07-08 ENCOUNTER — HOSPITAL ENCOUNTER (OUTPATIENT)
Age: 80
Discharge: HOME | End: 2020-07-10
Payer: MEDICARE

## 2020-07-08 VITALS
SYSTOLIC BLOOD PRESSURE: 90 MMHG | BODY MASS INDEX: 24.68 KG/M2 | TEMPERATURE: 97.5 F | HEART RATE: 72 BPM | RESPIRATION RATE: 20 BRPM | WEIGHT: 152.9 LBS | DIASTOLIC BLOOD PRESSURE: 44 MMHG

## 2020-07-08 LAB
INR BLD: 3.2
PROTHROMBIN TIME: 37.3 SEC (ref 9.3–12.4)

## 2020-07-08 PROCEDURE — 1036F TOBACCO NON-USER: CPT | Performed by: INTERNAL MEDICINE

## 2020-07-08 PROCEDURE — 99212 OFFICE O/P EST SF 10 MIN: CPT | Performed by: INTERNAL MEDICINE

## 2020-07-08 PROCEDURE — G8420 CALC BMI NORM PARAMETERS: HCPCS | Performed by: INTERNAL MEDICINE

## 2020-07-08 PROCEDURE — 36415 COLL VENOUS BLD VENIPUNCTURE: CPT | Performed by: INTERNAL MEDICINE

## 2020-07-08 PROCEDURE — 36415 COLL VENOUS BLD VENIPUNCTURE: CPT

## 2020-07-08 PROCEDURE — 4040F PNEUMOC VAC/ADMIN/RCVD: CPT | Performed by: INTERNAL MEDICINE

## 2020-07-08 PROCEDURE — 1123F ACP DISCUSS/DSCN MKR DOCD: CPT | Performed by: INTERNAL MEDICINE

## 2020-07-08 PROCEDURE — G8427 DOCREV CUR MEDS BY ELIG CLIN: HCPCS | Performed by: INTERNAL MEDICINE

## 2020-07-08 PROCEDURE — 85610 PROTHROMBIN TIME: CPT

## 2020-07-08 RX ORDER — LISINOPRIL 10 MG/1
5 TABLET ORAL DAILY
COMMUNITY
End: 2020-01-01

## 2020-07-08 RX ORDER — RANOLAZINE 500 MG/1
500 TABLET, EXTENDED RELEASE ORAL DAILY
Qty: 90 TABLET | Refills: 3 | Status: SHIPPED
Start: 2020-07-08 | End: 2021-01-01

## 2020-07-08 RX ORDER — OMEPRAZOLE 40 MG/1
40 CAPSULE, DELAYED RELEASE ORAL
Qty: 90 CAPSULE | Refills: 3 | Status: SHIPPED
Start: 2020-07-08 | End: 2021-01-01

## 2020-07-08 RX ORDER — LABETALOL 200 MG/1
100 TABLET, FILM COATED ORAL 2 TIMES DAILY
COMMUNITY
End: 2020-08-07

## 2020-07-08 RX ORDER — AMOXICILLIN 500 MG/1
CAPSULE ORAL
COMMUNITY
End: 2021-01-01

## 2020-07-08 RX ORDER — CYANOCOBALAMIN 1000 UG/ML
1000 INJECTION INTRAMUSCULAR; SUBCUTANEOUS ONCE
Status: COMPLETED | OUTPATIENT
Start: 2020-07-08 | End: 2020-01-01

## 2020-07-08 ASSESSMENT — PATIENT HEALTH QUESTIONNAIRE - PHQ9
SUM OF ALL RESPONSES TO PHQ QUESTIONS 1-9: 0
2. FEELING DOWN, DEPRESSED OR HOPELESS: 0
1. LITTLE INTEREST OR PLEASURE IN DOING THINGS: 0
SUM OF ALL RESPONSES TO PHQ QUESTIONS 1-9: 0
SUM OF ALL RESPONSES TO PHQ9 QUESTIONS 1 & 2: 0

## 2020-07-08 NOTE — PROGRESS NOTES
PT/INR drawn during OV and sent to lab. Per wife, current Coumadin is 2.5 mg on Thursdays, 5 mg all remaining days of the week.

## 2020-07-08 NOTE — PROGRESS NOTES
Chief Complaint   Patient presents with    Dementia     Pt has been seen on referral, now transferring care to Dr Leatha Smyth as PCP as his previous DR retired. Wife feels pt's memory is getting worse -- since a bout of diverticulitis in December.  Other     Wife states pt is due for his monthly B12 injection and overdue for his INR. States most recent Coumadin is 5 mg daily Friday through Wednesday and 2.5 mg on Thursdays.  Hypotension     90/44. Wife states pt's BP runs low.

## 2020-07-08 NOTE — PROGRESS NOTES
Still at [address] more   Doing all ADL's  And sits out on balcony daily   Great suntan  Cognitive tests decreasing  Needs B 12 shot  And INR for DVT hx  Doing ADL's  Very [place]  Here with wife  No Falls   BB OK  Using walker   4/6 and 2/3    And clock 4/4  And normal cube  BP 90's today but asymptomatic and wife   Checks BP regularly through the day    Impression;MCI with progression                     Hx of DVT and B12 deficiency                      Aricept dreams                      Hx of Pacemaker and IVC filter   Plan INR today and B12  1000  mcg im                Today to left deltoid with tamponade            By my resident and my supervision.            Switch Aricept to 20 mg q AM           And Namenda XR 28 mg a day            Consider dietary therapy

## 2020-07-10 ENCOUNTER — ANTI-COAG VISIT (OUTPATIENT)
Dept: GERIATRIC MEDICINE | Age: 80
End: 2020-07-10

## 2020-07-10 NOTE — PROGRESS NOTES
Wife verified current Coumadin -- 2.5 mg on Thursdays and 5 mg all remaining days of the week. States pt already rcvd his dose today, so they will hold it tomorrow, then resume routine dosing schedule.

## 2020-07-16 ENCOUNTER — PROCEDURE VISIT (OUTPATIENT)
Dept: PODIATRY | Age: 80
End: 2020-07-16
Payer: MEDICARE

## 2020-07-16 VITALS
SYSTOLIC BLOOD PRESSURE: 116 MMHG | WEIGHT: 150 LBS | DIASTOLIC BLOOD PRESSURE: 64 MMHG | BODY MASS INDEX: 24.21 KG/M2 | TEMPERATURE: 97.8 F

## 2020-07-16 PROCEDURE — 11721 DEBRIDE NAIL 6 OR MORE: CPT | Performed by: PODIATRIST

## 2020-07-16 ASSESSMENT — ENCOUNTER SYMPTOMS
RESPIRATORY NEGATIVE: 1
EYES NEGATIVE: 1

## 2020-07-16 NOTE — PROGRESS NOTES
Madelin Rosa  Return Patient    Chief Complaint   Patient presents with    Toe Pain     saw pcp Dr. Kathie Tanner on 7/8/2020       Subjective: This Trina Mccallum comes to clinic for foot and nail care. Pt currently has complaint of thickened, painful, elongated nails that he/she cannot manage by themselves. Pt. Relates pain to nails with shoe gear. Pt's primary care physician is Mohit Allen MD.  Past Medical History:   Diagnosis Date    Acute myocardial infarction, unspecified site 1995    Myocardial Infarction    [facility]'s [facility] [address]     Anemia associated with acute blood loss 4/11/2013    BPH associated with nocturia 4/9/2013    CAD (coronary artery disease), native coronary artery 4/9/2013    DVT, lower extremity, distal ([facility] [address] 4/9/2013    HTN, goal below 130/80 4/9/2013    Hx of blood clots     Hyperlipidemia LDL goal < 70 4/9/2013    Pulmonary emboli ([facility] [address] 4/9/2013    Retroperitoneal hematoma 4/11/2013    Shingles 2008       Allergies   Allergen Reactions    Heparin Other (See Comments)     \"Reaction\" to heparin per wife. Retroperitoneal hematoma.  Tape [place] Tape] Other (See Comments)     Skin tears.     [facility]     Current Outpatient Medications on File Prior to Visit   Medication Sig Dispense Refill    omeprazole (PRILOSEC) 40 MG delayed release capsule Take 1 capsule by mouth every morning (before breakfast) 90 capsule 3    ranolazine (RANEXA) 500 MG extended release tablet Take 1 tablet by mouth daily 90 tablet 3    amoxicillin (AMOXIL) 500 MG capsule Take by mouth Pre-dental work      labetalol (NORMODYNE) 200 MG tablet Take 100 mg by mouth 2 times daily      lisinopril (PRINIVIL;ZESTRIL) 10 MG tablet Take 5 mg by mouth daily      finasteride (PROSCAR) 5 MG tablet Take 5 mg by mouth three times a week On Monday, Wednesday, and Friday      tamsulosin (FLOMAX) 0.4 MG capsule Take 0.4 mg by mouth daily      cyanocobalamin 1000 MCG/ML injection Inject 1,000 mcg into the muscle every 30 days      magnesium hydroxide (MILK OF MAGNESIA) 400 MG/5ML suspension Take by mouth daily as needed for Constipation      warfarin (COUMADIN) 5 MG tablet Take by mouth Dosing varies based on INR. Dr Leatha Smyth now handling. Most recently taking 2.5 mg on Thursdays and 5 mg all remaining days of the week.  acetaminophen (TYLENOL) 500 MG tablet Take 1,000 mg by mouth as needed for Pain \"Seldom takes\".  donepezil (ARICEPT) 10 MG tablet Take 10 mg by mouth 2 times daily       memantine (NAMENDA XR) 28 MG CP24 capsule Take 28 mg by mouth daily      amLODIPine (NORVASC) 5 MG tablet Take 1 tablet by mouth daily. 30 tablet 2    Cholecalciferol (VITAMIN D3) 2000 UNITS CAPS Take 2,000 Units by mouth daily.  nitroGLYCERIN (NITROSTAT) 0.4 MG SL tablet Place 0.4 mg under the tongue every 5 minutes as needed.  rosuvastatin (CRESTOR) 10 MG tablet Take 10 mg by mouth daily. Current Facility-Administered Medications on File Prior to Visit   Medication Dose Route Frequency Provider Last Rate Last Dose    cyanocobalamin injection 1,000 mcg  1,000 mcg Intramuscular Once Lois Garcia MD         Review of Systems   Constitutional: Negative. HENT: Negative. Eyes: Negative. Respiratory: Negative. Endocrine: Negative. Genitourinary: Negative. Objective:  General: AAO x 3 in NAD.     Derm  Toenail Description  Sites of Onychomycosis Involvement (Check affected area)  [x] [x] [x] [x] [x] [x] [x] [x] [x] [x]  5 4 3 2 1 1 2 3 4 5                          Right                                        Left    Thickness  [x] [x] [x] [x] [x] [x] [x] [x] [x] [x]  5 4 3 2 1 1 2 3 4 5                         Right                                        Left    Dystrophic Changes   [x] [x] [x] [x] [x] [x] [x] [x] [x] [x]  5 4 3 2 1 1 2 3 4 5                         Right Left    Color  [x] [x] [x] [x] [x] [x] [x] [x] [x] [x]  5 4 3 2 1 1 2 3 4 5                          Right                                        Left    Incurvation/Ingrowin   [x] [x] [x] [x] [x] [x] [x] [x] [x] [x]  5 4 3 2 1 1 2 3 4 5                         Right                                        Left    Inflammation/Pain   [x] [x] [x] [x] [x] [x] [x] [x] [x] [x]  5 4 3  2 1 1 2 3 4 5                         Right                                        Left      Nails that are described above are all elongated thickened pitting mycotic yellowish incurvated causing pain with both shoe gear. Palpation nails greater then 3 mm thick painful       Dermatologic Exam:hair loss noted  lower extremity    Skin lesion/ulceration   Skin   Callus   Musculoskeletal:     1st MPJ ROM normal, Bilateral.  Muscle strength 5/5, Bilateral.  Pain present upon palpation of toenails 1-5   Bilateral., Bilateral.  Ankle ROM normal,Bilateral.    Dorsally contracted digits, Bilateral.     Vascular:  Pulses   bilateral DP     PT absnet    Neurological:  Sensation present to light touch to level of digits, Bilateral.    Foot Exam    General  General Appearance: appears stated age and healthy   Orientation: alert and oriented to person, place, and time   Assistance: walker use       Right Foot/Ankle     Inspection and Palpation  Ecchymosis: none  Tenderness: none   Swelling: dorsum   Arch: pes planus  Skin Exam: skin changes and abnormal color;      Neurovascular  Dorsalis pedis: absent  Posterior tibial: absent  Saphenous nerve sensation: normal  Tibial nerve sensation: normal  Superficial peroneal nerve sensation: normal  Deep peroneal nerve sensation: normal  Sural nerve sensation: normal  Achilles reflex: 2+  Babinski reflex: 2+    Muscle Strength  Ankle dorsiflexion: 3  Ankle plantar flexion: 3  Ankle inversion: 3  Great toe extension: 3  Great toe flexion: 3    Tests  Anterior drawer: negative   Varus tilt: negative   Syndesmosis squeeze: negative       Left Foot/Ankle      Inspection and Palpation  Ecchymosis: none  Tenderness: none   Skin Exam: skin changes and abnormal color; Neurovascular  Dorsalis pedis: absent  Posterior tibial: absent  Saphenous nerve sensation: normal  Tibial nerve sensation: normal  Superficial peroneal nerve sensation: normal  Deep peroneal nerve sensation: normal  Sural nerve sensation: normal  Achilles reflex: 2+  Babinski reflex: 2+             Right Ankle Exam     Muscle Strength   Dorsiflexion:  3/5  Plantar flexion:  3/5          Q7   []Yes    []No                Q8   [x]Yes    []No                     Q9   []Yes    []No  Assessment:  79 y.o. male with:   1. Tinea unguium    2. Pain in left toe(s)    3. Pain in toe of right foot    4. Peripheral vascular disease, unspecified ([facility] [address]    5. Difficulty walking     No orders of the defined types were placed in this encounter. Plan:   Pt was evaluated and examined. Patient was given personalized discharge instructions. Nails 1-10 were debrided in length and thickness sharply with a nail nipper and  without incident. Pt will follow up in 9 weeks or sooner if any problems arise. Diagnosis was discussed with the pt and all of their questions were answered in detail. Proper foot hygiene and care was discussed with the pt. Patient to check feet daily and contact the office with any questions/problems/concerns. Other comorbidity noted and will be managed by PCP. Pain waiver discussed with patient and confirmed.    7/16/2020      Electronically signed by Neida Yeager DPM on 7/16/2020 at 2:48 PM  7/16/2020

## 2020-08-03 ENCOUNTER — TELEPHONE (OUTPATIENT)
Dept: GERIATRIC MEDICINE | Age: 80
End: 2020-08-03

## 2020-08-07 RX ORDER — ROSUVASTATIN CALCIUM 10 MG/1
10 TABLET, COATED ORAL DAILY
Qty: 90 TABLET | Refills: 3 | Status: SHIPPED
Start: 2020-08-07 | End: 2021-01-01

## 2020-08-07 RX ORDER — LABETALOL 100 MG/1
100 TABLET, FILM COATED ORAL 2 TIMES DAILY
Qty: 180 TABLET | Refills: 3 | Status: SHIPPED
Start: 2020-08-07 | End: 2021-01-01

## 2020-08-07 RX ORDER — MEMANTINE HYDROCHLORIDE 28 MG/1
28 CAPSULE, EXTENDED RELEASE ORAL DAILY
Qty: 90 CAPSULE | Refills: 3 | Status: SHIPPED
Start: 2020-08-07 | End: 2021-01-01 | Stop reason: SDUPTHER

## 2020-08-07 RX ORDER — AMLODIPINE BESYLATE 5 MG/1
5 TABLET ORAL DAILY
Qty: 90 TABLET | Refills: 3 | Status: SHIPPED
Start: 2020-08-07 | End: 2021-01-01

## 2020-08-07 RX ORDER — DONEPEZIL HYDROCHLORIDE 10 MG/1
10 TABLET, FILM COATED ORAL 2 TIMES DAILY
Qty: 180 TABLET | Refills: 3 | Status: SHIPPED
Start: 2020-08-07 | End: 2020-01-01

## 2020-08-07 NOTE — TELEPHONE ENCOUNTER
Spoke with wife re: Labetalol dosing. She confirmed pt is currently taking 1/2 of a 200 mg tablet BID. Discussed with her that a 100 mg tablet is available. Wife states she would rather have that if possible.

## 2020-08-12 ENCOUNTER — HOSPITAL ENCOUNTER (OUTPATIENT)
Age: 80
Discharge: HOME | End: 2020-08-14
Payer: MEDICARE

## 2020-08-12 ENCOUNTER — TELEPHONE (OUTPATIENT)
Dept: GERIATRIC MEDICINE | Age: 80
End: 2020-08-12

## 2020-08-12 ENCOUNTER — ANTI-COAG VISIT (OUTPATIENT)
Dept: GERIATRIC MEDICINE | Age: 80
End: 2020-08-12

## 2020-08-12 ENCOUNTER — NURSE ONLY (OUTPATIENT)
Dept: GERIATRIC MEDICINE | Age: 80
End: 2020-08-12
Payer: MEDICARE

## 2020-08-12 VITALS — TEMPERATURE: 98 F

## 2020-08-12 LAB
INR BLD: 3.5
PROTHROMBIN TIME: 40.3 SEC (ref 9.3–12.4)

## 2020-08-12 PROCEDURE — 85610 PROTHROMBIN TIME: CPT

## 2020-08-12 PROCEDURE — 36415 COLL VENOUS BLD VENIPUNCTURE: CPT

## 2020-08-12 RX ORDER — CYANOCOBALAMIN 1000 UG/ML
1000 INJECTION INTRAMUSCULAR; SUBCUTANEOUS ONCE
Status: COMPLETED | OUTPATIENT
Start: 2020-08-12 | End: 2020-08-12

## 2020-08-12 RX ADMIN — CYANOCOBALAMIN 1000 MCG: 1000 INJECTION INTRAMUSCULAR; SUBCUTANEOUS at 10:30

## 2020-08-12 NOTE — TELEPHONE ENCOUNTER
During lab visit this morning, wife reported that pt had incontinence of stool x 2 and of urine x 1 in the past 2.5 weeks. Happened during the night, & pt did not seem aware at the time. Pt mentioned it himself today. Sleeps a lot during the day, but that is not new, per wife. Suggestions?

## 2020-08-12 NOTE — PROGRESS NOTES
INR drawn & sent to lab. Per wife, Coumadin dosing is 5 mg daily Fridays through Wednesdays, and 2.5 mg on Thursdays. B12 injection given without difficulty. Pressure held to site d/t Coumadin therapy. No bleeding noted.

## 2020-08-12 NOTE — TELEPHONE ENCOUNTER
Spoke with wife and incontnence has cleared and probably due to excessive peaches he ate  And she will not give again  Also gave wife instructions on warfarin as listed

## 2020-09-09 RX ORDER — LISINOPRIL 5 MG/1
5 TABLET ORAL DAILY
Qty: 90 TABLET | Refills: 3 | Status: SHIPPED
Start: 2020-09-09 | End: 2021-01-01

## 2020-09-09 RX ORDER — WARFARIN SODIUM 5 MG/1
5 TABLET ORAL DAILY
Qty: 90 TABLET | Refills: 3 | Status: SHIPPED
Start: 2020-09-09 | End: 2021-01-01

## 2020-09-09 NOTE — TELEPHONE ENCOUNTER
Spoke with wife to schedule next INR, B12 injection. During the call, wife stated pt needs refills of Warfarin 5 mg (daily, except he takes 2.5 mg on Thursdays) and Lisinopril (takes 5 mg daily). Re: Lisinopril, pt has been cutting a 10 mg tab in 1/2. Wife would like to get a 5 mg tablet if available. Send to [employer].

## 2020-09-11 ENCOUNTER — ANTI-COAG VISIT (OUTPATIENT)
Dept: GERIATRIC MEDICINE | Age: 80
End: 2020-09-11

## 2020-09-11 ENCOUNTER — HOSPITAL ENCOUNTER (OUTPATIENT)
Age: 80
Discharge: HOME | End: 2020-09-13
Payer: MEDICARE

## 2020-09-11 ENCOUNTER — NURSE ONLY (OUTPATIENT)
Dept: GERIATRIC MEDICINE | Age: 80
End: 2020-09-11
Payer: MEDICARE

## 2020-09-11 VITALS — TEMPERATURE: 97.8 F

## 2020-09-11 LAB
INR BLD: 3.3
PROTHROMBIN TIME: 38.1 SEC (ref 9.3–12.4)

## 2020-09-11 PROCEDURE — 6360000002 HC RX W HCPCS

## 2020-09-11 PROCEDURE — 85610 PROTHROMBIN TIME: CPT

## 2020-09-11 RX ORDER — CYANOCOBALAMIN 1000 UG/ML
1000 INJECTION INTRAMUSCULAR; SUBCUTANEOUS ONCE
Status: COMPLETED | OUTPATIENT
Start: 2020-09-11 | End: 2020-09-11

## 2020-09-11 RX ADMIN — CYANOCOBALAMIN 1000 MCG: 1000 INJECTION INTRAMUSCULAR; SUBCUTANEOUS at 10:44

## 2020-09-11 NOTE — PROGRESS NOTES
Left message for wife on both the land line and cell phone, to have pt hold Warfarin x1 day, take only 2.5 mg the next day, then resume normal dosing schedule of 2.5 mg every Thursday and 5 mg all remaining days of the week. Recheck INR in 4 weeks. Requested wife call back on Monday to verify that she rcvd and understood these instructions.

## 2020-09-11 NOTE — PROGRESS NOTES
PT/INR drawn without difficulty from left antecubital area. Bloodwork sent to lab. Per wife, Warfarin dose is 5 mg daily Fridays through Wednesdays, 2.5 mg on Thursdays.

## 2020-09-14 NOTE — PROGRESS NOTES
Physical Therapy  [facility]  Daily Treatment Note  NAME: Meenakshi Posada  : 1940  MRN: [id number]    Date of Service: 2019    Evaluating Therapist: Maya Robin PT, DPT     Room #: 5340M  DIAGNOSIS: Acute respiratory failure with hypoxemia  PRECAUTIONS: Falls, O2, [place], R 5th digit finger nail trauma, Bilateral rib fractures of undetermined age, L pubic rami fx, WBAT B LE (per ortho note on )  S/p fall at home     Social:  Pt lives with his wife in a 1 floor plan independent living apt with no step(s) and no rail(s) to enter. 10 steps with rail to apt level if needed. Elevator access used by patient. Prior to admission pt walked with no AD. Wife reported patient has a Rollator [employer] but does not use it. Patient reported independent with ADLs. Wife reported multiple falls at home.                Initial Evaluation  Date: 19 Treatment Date: 19   Short Term/ Long Term   Goals   Was pt agreeable to Eval/treatment? Yes  Yes     Does pt have pain? Patient reported L groin pain as 3/10 at rest, increased to 8-10/10 with mobility. No pain at rest, 5/10 L hip pain when weightbearing (high pain threshold)     Bed Mobility  Rolling: Mod A  Supine to sit: Mod A  Sit to supine: Max A  Scooting: Min A Rolling: NT  Supine to sit: Mod A  Sit to supine: Mod A x2  Scooting: SBA toward EOB Min A   Transfers Sit to stand: Mod A +2  Stand to sit: Mod A  Stand pivot: NT Sit to stand: Max A  Stand to sit: Max A  Stand pivot: Max A with [employer] Min A   Ambulation    2 feet laterally with [employer] with Mod A +2 Sidestepped 1 foot to R with [employer] with Max A  1.5, 2 feet forward with [employer] with Max A >50 feet with [employer] with Min A   Stair negotiation: ascended and descended  NT NT 4 steps with 1 rail with Min A   AM-PAC Score 9/24 10/24       Patient education  Pt educated on WBAT status of B LE.     Patient response to education:   Pt verbalized understanding Pt demonstrated skill Pt requires further Other specify

## 2020-09-23 ENCOUNTER — PROCEDURE VISIT (OUTPATIENT)
Dept: PODIATRY | Age: 80
End: 2020-09-23
Payer: MEDICARE

## 2020-09-23 VITALS
WEIGHT: 153 LBS | DIASTOLIC BLOOD PRESSURE: 78 MMHG | BODY MASS INDEX: 24.69 KG/M2 | SYSTOLIC BLOOD PRESSURE: 133 MMHG | TEMPERATURE: 98.2 F

## 2020-09-23 PROCEDURE — 11721 DEBRIDE NAIL 6 OR MORE: CPT | Performed by: PODIATRIST

## 2020-09-23 ASSESSMENT — ENCOUNTER SYMPTOMS
EYES NEGATIVE: 1
RESPIRATORY NEGATIVE: 1

## 2020-09-23 NOTE — PROGRESS NOTES
Chacho Mccallum  Return Patient    Chief Complaint   Patient presents with    Toe Pain     saw pcp Dr. Masood Lee 7/8/2020       Subjective: This [place] comes to clinic for foot and nail care. Pt currently has complaint of thickened, painful, elongated nails that he/she cannot manage by themselves. Pt. Relates pain to nails with shoe gear. Pt's primary care physician is Ramón Guerra MD.  Past Medical History:   Diagnosis Date    Acute myocardial infarction, unspecified site 1995    Myocardial Infarction    Alzheimer's disease ([facility] [address]     Anemia associated with acute blood loss 4/11/2013    BPH associated with nocturia 4/9/2013    CAD (coronary artery disease), native coronary artery 4/9/2013    DVT, lower extremity, distal ([facility] [address] 4/9/2013    HTN, goal below 130/80 4/9/2013    Hx of blood clots     Hyperlipidemia LDL goal < 70 4/9/2013    Pulmonary emboli ([facility] [address] 4/9/2013    Retroperitoneal hematoma 4/11/2013    Shingles 2008       Allergies   Allergen Reactions    Heparin Other (See Comments)     \"Reaction\" to heparin per wife. Retroperitoneal hematoma.  Tape [place] Tape] Other (See Comments)     Skin tears.  Tape [place] Tape] Rash     Current Outpatient Medications on File Prior to Visit   Medication Sig Dispense Refill    warfarin (COUMADIN) 5 MG tablet Take 1 tablet by mouth daily As directed, based on INR results.  90 tablet 3    lisinopril (PRINIVIL;ZESTRIL) 5 MG tablet Take 1 tablet by mouth daily 90 tablet 3    donepezil (ARICEPT) 10 MG tablet Take 1 tablet by mouth 2 times daily 180 tablet 3    memantine ER (NAMENDA XR) 28 MG CP24 extended release capsule Take 1 capsule by mouth daily 90 capsule 3    amLODIPine (NORVASC) 5 MG tablet Take 1 tablet by mouth daily 90 tablet 3    rosuvastatin (CRESTOR) 10 MG tablet Take 1 tablet by mouth daily 90 tablet 3    labetalol (NORMODYNE) 100 MG tablet Take 1 tablet by mouth 2 times daily 180 tablet 3    omeprazole (PRILOSEC) 40 MG delayed release capsule Take 1 capsule by mouth every morning (before breakfast) 90 capsule 3    ranolazine (RANEXA) 500 MG extended release tablet Take 1 tablet by mouth daily 90 tablet 3    amoxicillin (AMOXIL) 500 MG capsule Take by mouth Pre-dental work      lisinopril (PRINIVIL;ZESTRIL) 10 MG tablet Take 5 mg by mouth daily      finasteride (PROSCAR) 5 MG tablet Take 5 mg by mouth three times a week On Monday, Wednesday, and Friday      tamsulosin (FLOMAX) 0.4 MG capsule Take 0.4 mg by mouth daily      cyanocobalamin 1000 MCG/ML injection Inject 1,000 mcg into the muscle every 30 days      magnesium hydroxide (MILK OF MAGNESIA) 400 MG/5ML suspension Take by mouth daily as needed for Constipation      acetaminophen (TYLENOL) 500 MG tablet Take 1,000 mg by mouth as needed for Pain \"Seldom takes\".  Cholecalciferol (VITAMIN D3) 2000 UNITS CAPS Take 2,000 Units by mouth daily.  nitroGLYCERIN (NITROSTAT) 0.4 MG SL tablet Place 0.4 mg under the tongue every 5 minutes as needed. Current Facility-Administered Medications on File Prior to Visit   Medication Dose Route Frequency Provider Last Rate Last Dose    cyanocobalamin injection 1,000 mcg  1,000 mcg Intramuscular Once Perlita Aleman MD         Review of Systems   Constitutional: Negative. HENT: Negative. Eyes: Negative. Respiratory: Negative. Endocrine: Negative. Genitourinary: Negative. Objective:  General: AAO x 3 in NAD.     Derm  Toenail Description  Sites of Onychomycosis Involvement (Check affected area)  [x] [x] [x] [x] [x] [x] [x] [x] [x] [x]  5 4 3 2 1 1 2 3 4 5                          Right                                        Left    Thickness  [x] [x] [x] [x] [x] [x] [x] [x] [x] [x]  5 4 3 2 1 1 2 3 4 5                         Right                                        Left    Dystrophic Changes   [x] [x] [x] [x] [x] [x] [x] [x] [x] [x]  5 4 3 2 1 1 2 3 4 5 Right                                        Left    Color  [x] [x] [x] [x] [x] [x] [x] [x] [x] [x]  5 4 3 2 1 1 2 3 4 5                          Right                                        Left    Incurvation/Ingrowin   [x] [x] [x] [x] [x] [x] [x] [x] [x] [x]  5 4 3 2 1 1 2 3 4 5                         Right                                        Left    Inflammation/Pain   [x] [x] [x] [x] [x] [x] [x] [x] [x] [x]  5 4 3  2 1 1 2 3 4 5                         Right                                        Left      Nails that are described above are all elongated thickened pitting mycotic yellowish incurvated causing pain with both shoe gear. Palpation nails greater then 3 mm thick painful       Dermatologic Exam:hair loss noted  lower extremity    Skin lesion/ulceration   Skin   Callus   Musculoskeletal:     1st MPJ ROM normal, Bilateral.  Muscle strength 5/5, Bilateral.  Pain present upon palpation of toenails 1-5   Bilateral., Bilateral.  Ankle ROM normal,Bilateral.    Dorsally contracted digits, Bilateral.     Vascular:  Pulses   bilateral DP     PT absnet    Neurological:  Sensation present to light touch to level of digits, Bilateral.    Foot Exam    General  General Appearance: appears stated age and healthy   Orientation: alert and oriented to person, place, and time   Assistance: walker use       Right Foot/Ankle     Inspection and Palpation  Ecchymosis: none  Tenderness: none   Swelling: dorsum   Arch: pes planus  Skin Exam: skin changes and abnormal color;      Neurovascular  Dorsalis pedis: absent  Posterior tibial: absent  Saphenous nerve sensation: normal  Tibial nerve sensation: normal  Superficial peroneal nerve sensation: normal  Deep peroneal nerve sensation: normal  Sural nerve sensation: normal  Achilles reflex: 2+  Babinski reflex: 2+    Muscle Strength  Ankle dorsiflexion: 3  Ankle plantar flexion: 3  Ankle inversion: 3  Great toe extension: 3  Great toe flexion: 3    Tests  Anterior drawer: negative   Varus tilt: negative   Syndesmosis squeeze: negative       Left Foot/Ankle      Inspection and Palpation  Ecchymosis: none  Tenderness: none   Skin Exam: skin changes and abnormal color; Neurovascular  Dorsalis pedis: absent  Posterior tibial: absent  Saphenous nerve sensation: normal  Tibial nerve sensation: normal  Superficial peroneal nerve sensation: normal  Deep peroneal nerve sensation: normal  Sural nerve sensation: normal  Achilles reflex: 2+  Babinski reflex: 2+             Right Ankle Exam     Muscle Strength   Dorsiflexion:  3/5  Plantar flexion:  3/5          Q7   []Yes    []No                Q8   [x]Yes    []No                     Q9   []Yes    []No  Assessment:  79 y.o. male with:   1. Tinea unguium    2. Pain in left toe(s)    3. Pain in toe of right foot    4. Peripheral vascular disease, unspecified ([facility] [address]    5. Difficulty walking     No orders of the defined types were placed in this encounter. Plan:   Pt was evaluated and examined. Patient was given personalized discharge instructions. Nails 1-10 were debrided in length and thickness sharply with a nail nipper and  without incident. Pt will follow up in 9 weeks or sooner if any problems arise. Diagnosis was discussed with the pt and all of their questions were answered in detail. Proper foot hygiene and care was discussed with the pt. Patient to check feet daily and contact the office with any questions/problems/concerns. Other comorbidity noted and will be managed by PCP. Pain waiver discussed with patient and confirmed.    9/23/2020      Electronically signed by Myrtle Hammonds DPM on 9/23/2020 at 2:38 PM  9/23/2020

## 2020-10-07 NOTE — PROGRESS NOTES
PT/INR and screening PSA drawn and sent to lab. Per wife, current Warfarin dose is 2.5 mg on Thursdays and 5 mg all remaining days of the week. Flu vaccine given without difficulty. Pressure held to site d/t Warfarin therapy. No bleeding noted.

## 2020-10-07 NOTE — PROGRESS NOTES
Wife notified to hold Warfarin x 3 days, then continue routine dosing schedule of 2.5 mg on Thursdays and 5 mg all remaining days of the week. Repeat INR in 4 weeks. States understanding.

## 2020-11-03 PROBLEM — I10 HYPERTENSION: Status: RESOLVED | Noted: 2020-01-01 | Resolved: 2020-01-01

## 2020-11-03 PROBLEM — I25.10 CORONARY ARTERY DISEASE: Status: RESOLVED | Noted: 2020-01-01 | Resolved: 2020-01-01

## 2020-11-04 NOTE — PROGRESS NOTES
Called pt and spoke to pt wife told her to have pt hold his coumadin for two days and then to resume reg schedule of 2.5 mg on Thursday and 5 mg all other days of the week.  Wife understood

## 2020-12-02 NOTE — PATIENT INSTRUCTIONS
Patient Education        Preventing Falls: Care Instructions  Your Care Instructions     Getting around your home safely can be a challenge if you have injuries or health problems that make it easy for you to fall. Loose rugs and furniture in walkways are among the dangers for many older people who have problems walking or who have poor eyesight. People who have conditions such as arthritis, osteoporosis, or dementia also have to be careful not to fall. You can make your home safer with a few simple measures. Follow-up care is a key part of your treatment and safety. Be sure to make and go to all appointments, and call your doctor if you are having problems. It's also a good idea to know your test results and keep a list of the medicines you take. How can you care for yourself at home? Taking care of yourself  · You may get dizzy if you do not drink enough water. To prevent dehydration, drink plenty of fluids, enough so that your urine is light yellow or clear like water. Choose water and other caffeine-free clear liquids. If you have kidney, heart, or liver disease and have to limit fluids, talk with your doctor before you increase the amount of fluids you drink. · Exercise regularly to improve your strength, muscle tone, and balance. Walk if you can. Swimming may be a good choice if you cannot walk easily. · Have your vision and hearing checked each year or any time you notice a change. If you have trouble seeing and hearing, you might not be able to avoid objects and could lose your balance. · Know the side effects of the medicines you take. Ask your doctor or pharmacist whether the medicines you take can affect your balance. Sleeping pills or sedatives can affect your balance. · Limit the amount of alcohol you drink. Alcohol can impair your balance and other senses. · Ask your doctor whether calluses or corns on your feet need to be removed.  If you wear loose-fitting shoes because of calluses or corns, that will allow you to sit while showering. · Get into a tub or shower by putting the weaker leg in first. Get out of a tub or shower with your strong side first.  · Repair loose toilet seats and consider installing a raised toilet seat to make getting on and off the toilet easier. · Keep your bathroom door unlocked while you are in the shower. Where can you learn more? Go to https://[employer]. org and sign in to your [employer] account. Enter [phone] in the [employer] box to learn more about \"Preventing Falls: Care Instructions. \"     If you do not have an account, please click on the \"Sign Up Now\" link. Current as of: April 15, 2020               Content Version: 12.6  © 0681-2749 [employer]. Care instructions adapted under license by [facility] ([facility]). If you have questions about a medical condition or this instruction, always ask your healthcare professional. [address] any warranty or liability for your use of this information.

## 2020-12-02 NOTE — PROGRESS NOTES
Wife notified of elevated INR. Verified current Warfarin dosing as 2.5 mg on Thursdays, and 5 mg all remaining days of the week. Instructed, per Dr Joya Weber' order, to have pt hold Warfarin x 3 days, then resume routine dosing schedule, and repeat INR on 12/30/20. States understanding.

## 2020-12-04 NOTE — TELEPHONE ENCOUNTER
D-in-law, Jevon Rodriguez has some concerns re: pt that she would like to discuss with  Requests a call back, at [facility] convenience.

## 2020-12-07 NOTE — TELEPHONE ENCOUNTER
Follow up from 12/4/20 phone encounter, re: stopping Donepezil. Per wife, she is stopping Donepezil as of today. She inquired what to do about the Namenda. Told her to keep pt on Namenda for now. Asked her to call back with an update in a week, sooner if needed, to see if stopping Donepezil has helped with the nightmares. States understanding.

## 2020-12-09 NOTE — PROGRESS NOTES
David Trevino  Return Patient    Chief Complaint   Patient presents with    Toe Pain     SAW PCP DR. Rachael Sequeira ON 12/2/2020       Subjective: This Sanya Larson comes to clinic for foot and nail care. Pt currently has complaint of thickened, painful, elongated nails that he/she cannot manage by themselves. Pt. Relates pain to nails with shoe gear. Pt's primary care physician is Bety Brownlee MD.  Past Medical History:   Diagnosis Date    Acute myocardial infarction, unspecified site 1995    Myocardial Infarction    Alzheimer's disease ([facility] [address]     Anemia associated with acute blood loss 4/11/2013    BPH associated with nocturia 4/9/2013    CAD (coronary artery disease), native coronary artery 4/9/2013    DVT, lower extremity, distal ([facility] [address] 4/9/2013    HTN, goal below 130/80 4/9/2013    Hx of blood clots     Hyperlipidemia LDL goal < 70 4/9/2013    Pulmonary emboli ([facility] [address] 4/9/2013    Retroperitoneal hematoma 4/11/2013    Shingles 2008       Allergies   Allergen Reactions    Heparin Other (See Comments)     \"Reaction\" to heparin per wife. Retroperitoneal hematoma.  Tape Destiney Colder Tape] Other (See Comments)     Skin tears.  Tape Destiney Colder Tape] Rash     Current Outpatient Medications on File Prior to Visit   Medication Sig Dispense Refill    warfarin (COUMADIN) 5 MG tablet Take 1 tablet by mouth daily As directed, based on INR results.  90 tablet 3    lisinopril (PRINIVIL;ZESTRIL) 5 MG tablet Take 1 tablet by mouth daily 90 tablet 3    memantine ER (NAMENDA XR) 28 MG CP24 extended release capsule Take 1 capsule by mouth daily 90 capsule 3    amLODIPine (NORVASC) 5 MG tablet Take 1 tablet by mouth daily 90 tablet 3    rosuvastatin (CRESTOR) 10 MG tablet Take 1 tablet by mouth daily 90 tablet 3    labetalol (NORMODYNE) 100 MG tablet Take 1 tablet by mouth 2 times daily 180 tablet 3    omeprazole (PRILOSEC) 40 MG delayed release capsule Take 1 capsule by mouth every morning (before breakfast) 90 capsule 3    ranolazine (RANEXA) 500 MG extended release tablet Take 1 tablet by mouth daily 90 tablet 3    amoxicillin (AMOXIL) 500 MG capsule Take by mouth Pre-dental work      lisinopril (PRINIVIL;ZESTRIL) 10 MG tablet Take 5 mg by mouth daily      finasteride (PROSCAR) 5 MG tablet Take 5 mg by mouth three times a week On Monday, Wednesday, and Friday      tamsulosin (FLOMAX) 0.4 MG capsule Take 0.4 mg by mouth daily      cyanocobalamin 1000 MCG/ML injection Inject 1,000 mcg into the muscle every 30 days      magnesium hydroxide (MILK OF MAGNESIA) 400 MG/5ML suspension Take by mouth daily as needed for Constipation      acetaminophen (TYLENOL) 500 MG tablet Take 1,000 mg by mouth as needed for Pain \"Seldom takes\".  Cholecalciferol (VITAMIN D3) 2000 UNITS CAPS Take 2,000 Units by mouth daily.  nitroGLYCERIN (NITROSTAT) 0.4 MG SL tablet Place 0.4 mg under the tongue every 5 minutes as needed. No current facility-administered medications on file prior to visit. Review of Systems   Constitutional: Negative. HENT: Negative. Eyes: Negative. Respiratory: Negative. Endocrine: Negative. Genitourinary: Negative. Objective:  General: AAO x 3 in NAD.     Derm  Toenail Description  Sites of Onychomycosis Involvement (Check affected area)  [x] [x] [x] [x] [x] [x] [x] [x] [x] [x]  5 4 3 2 1 1 2 3 4 5                          Right                                        Left    Thickness  [x] [x] [x] [x] [x] [x] [x] [x] [x] [x]  5 4 3 2 1 1 2 3 4 5                         Right                                        Left    Dystrophic Changes   [x] [x] [x] [x] [x] [x] [x] [x] [x] [x]  5 4 3 2 1 1 2 3 4 5                         Right                                        Left    Color  [x] [x] [x] [x] [x] [x] [x] [x] [x] [x]  5 4 3 2 1 1 2 3 4 5                          Right                                        Left    Incurvation/Ingrowin [x] [x] [x] [x] [x] [x] [x] [x] [x] [x]  5 4 3 2 1 1 2 3 4 5                         Right                                        Left    Inflammation/Pain   [x] [x] [x] [x] [x] [x] [x] [x] [x] [x]  5 4 3  2 1 1 2 3 4 5                         Right                                        Left      Nails that are described above are all elongated thickened pitting mycotic yellowish incurvated causing pain with both shoe gear. Palpation nails greater then 3 mm thick painful       Dermatologic Exam:hair loss noted  lower extremity    Skin lesion/ulceration   Skin   Callus   Musculoskeletal:     1st MPJ ROM normal, Bilateral.  Muscle strength 5/5, Bilateral.  Pain present upon palpation of toenails 1-5   Bilateral., Bilateral.  Ankle ROM normal,Bilateral.    Dorsally contracted digits, Bilateral.     Vascular:  Pulses   bilateral DP     PT absnet    Neurological:  Sensation present to light touch to level of digits, Bilateral.    Foot Exam    General  General Appearance: appears stated age and healthy   Orientation: alert and oriented to person, place, and time   Assistance: walker use       Right Foot/Ankle     Inspection and Palpation  Ecchymosis: none  Tenderness: none   Swelling: dorsum   Arch: pes planus  Skin Exam: skin changes and abnormal color; Neurovascular  Dorsalis pedis: absent  Posterior tibial: absent  Saphenous nerve sensation: normal  Tibial nerve sensation: normal  Superficial peroneal nerve sensation: normal  Deep peroneal nerve sensation: normal  Sural nerve sensation: normal  Achilles reflex: 2+  Babinski reflex: 2+    Muscle Strength  Ankle dorsiflexion: 3  Ankle plantar flexion: 3  Ankle inversion: 3  Great toe extension: 3  Great toe flexion: 3    Tests  Anterior drawer: negative   Varus tilt: negative   Syndesmosis squeeze: negative       Left Foot/Ankle      Inspection and Palpation  Ecchymosis: none  Tenderness: none   Skin Exam: skin changes and abnormal color;      Neurovascular  Dorsalis pedis: absent  Posterior tibial: absent  Saphenous nerve sensation: normal  Tibial nerve sensation: normal  Superficial peroneal nerve sensation: normal  Deep peroneal nerve sensation: normal  Sural nerve sensation: normal  Achilles reflex: 2+  Babinski reflex: 2+             Right Ankle Exam     Muscle Strength   Dorsiflexion:  3/5  Plantar flexion:  3/5          Q7   []Yes    []No                Q8   [x]Yes    []No                     Q9   []Yes    []No  Assessment:  79 y.o. male with:   1. Tinea unguium    2. Pain in left toe(s)    3. Pain in toe of right foot    4. Peripheral vascular disease, unspecified ([facility] [address]    5. Difficulty walking     No orders of the defined types were placed in this encounter. Plan:   Pt was evaluated and examined. Patient was given personalized discharge instructions. Nails 1-10 were debrided in length and thickness sharply with a nail nipper and  without incident. Pt will follow up in 9 weeks or sooner if any problems arise. Diagnosis was discussed with the pt and all of their questions were answered in detail. Proper foot hygiene and care was discussed with the pt. Patient to check feet daily and contact the office with any questions/problems/concerns. Other comorbidity noted and will be managed by PCP. Pain waiver discussed with patient and confirmed.    12/9/2020      Electronically signed by Alessio Joshi DPM on 12/9/2020 at 2:49 PM  12/9/2020

## 2020-12-30 NOTE — PROGRESS NOTES
More SOB recently and very SOB with walking from waiting room,   And word finding and still likes sweets like chocolates   Now seems to be in NSR  And BP standing at this time is 108/x   Ate about 2 hours ago  Probable PP Hypotension  H&L clear  2/3 3 point fix and said 80 instead of 81  KIZZY  Got Wednesday   Missed 30 th and month   4/6 6 point fix   Clock 3/4   Animals 9/0  Impression: OLIVEIRA and worse and no orthopnea                      BP now 108/x     Plan: Namenda 20 mg a day              Check pulse OX with exertion today              Standard blood work               Consider CXR

## 2020-12-30 NOTE — PROGRESS NOTES
Bloodwork, as ordered, drawn during OV & taken to lab. Re: pulse ox -- at 4:40 pm, on room air, with pt standing, pulse ox was 94%. Ambulated approx 50 ft. Pulse ox then 91%, on room air. Quickly returned to 94%. No distress noted in pt. Daughter-in-law mentioned that pt had a previous history of PE. Dr Suresh Gore notified of pulse ox results, & discussed history of PE with pt's family. CXR ordered. Script provided to pt / family & will have done today.

## 2021-01-01 ENCOUNTER — PROCEDURE VISIT (OUTPATIENT)
Dept: PODIATRY | Age: 81
End: 2021-01-01
Payer: MEDICARE

## 2021-01-01 ENCOUNTER — APPOINTMENT (OUTPATIENT)
Dept: GENERAL RADIOLOGY | Age: 81
DRG: 086 | End: 2021-01-01
Payer: MEDICARE

## 2021-01-01 ENCOUNTER — TELEPHONE (OUTPATIENT)
Dept: GERIATRIC MEDICINE | Age: 81
End: 2021-01-01

## 2021-01-01 ENCOUNTER — ANTI-COAG VISIT (OUTPATIENT)
Dept: GERIATRIC MEDICINE | Age: 81
End: 2021-01-01

## 2021-01-01 ENCOUNTER — NURSE ONLY (OUTPATIENT)
Dept: GERIATRIC MEDICINE | Age: 81
End: 2021-01-01
Payer: MEDICARE

## 2021-01-01 ENCOUNTER — APPOINTMENT (OUTPATIENT)
Dept: ULTRASOUND IMAGING | Age: 81
DRG: 086 | End: 2021-01-01
Payer: MEDICARE

## 2021-01-01 ENCOUNTER — OFFICE VISIT (OUTPATIENT)
Dept: NEUROSURGERY | Age: 81
End: 2021-01-01
Payer: MEDICARE

## 2021-01-01 ENCOUNTER — APPOINTMENT (OUTPATIENT)
Dept: CT IMAGING | Age: 81
End: 2021-01-01
Payer: MEDICARE

## 2021-01-01 ENCOUNTER — TELEPHONE (OUTPATIENT)
Dept: NEUROSURGERY | Age: 81
End: 2021-01-01

## 2021-01-01 ENCOUNTER — APPOINTMENT (OUTPATIENT)
Dept: CT IMAGING | Age: 81
DRG: 086 | End: 2021-01-01
Payer: MEDICARE

## 2021-01-01 ENCOUNTER — HOSPITAL ENCOUNTER (EMERGENCY)
Age: 81
Discharge: NURSING, REHAB OR LONG-TERM CARE | End: 2021-09-30
Attending: EMERGENCY MEDICINE | Admitting: INTERNAL MEDICINE
Payer: MEDICARE

## 2021-01-01 ENCOUNTER — HOSPITAL ENCOUNTER (OUTPATIENT)
Dept: ULTRASOUND IMAGING | Age: 81
Discharge: HOME | End: 2021-07-13
Payer: MEDICARE

## 2021-01-01 ENCOUNTER — HOSPITAL ENCOUNTER (EMERGENCY)
Age: 81
Discharge: ANOTHER HOSPITAL | End: 2021-09-29
Attending: EMERGENCY MEDICINE
Payer: MEDICARE

## 2021-01-01 ENCOUNTER — HOSPITAL ENCOUNTER (INPATIENT)
Age: 81
LOS: 4 days | Discharge: NURSING, REHAB OR LONG-TERM CARE | DRG: 086 | End: 2021-07-29
Attending: EMERGENCY MEDICINE | Admitting: SURGERY
Payer: MEDICARE

## 2021-01-01 ENCOUNTER — HOSPITAL ENCOUNTER (EMERGENCY)
Age: 81
Discharge: HOME | End: 2021-09-30
Attending: EMERGENCY MEDICINE
Payer: MEDICARE

## 2021-01-01 ENCOUNTER — APPOINTMENT (OUTPATIENT)
Dept: GENERAL RADIOLOGY | Age: 81
End: 2021-01-01
Payer: MEDICARE

## 2021-01-01 ENCOUNTER — OFFICE VISIT (OUTPATIENT)
Dept: GERIATRIC MEDICINE | Age: 81
End: 2021-01-01
Payer: MEDICARE

## 2021-01-01 VITALS
HEIGHT: 68 IN | WEIGHT: 163 LBS | HEART RATE: 68 BPM | TEMPERATURE: 97.7 F | DIASTOLIC BLOOD PRESSURE: 72 MMHG | BODY MASS INDEX: 24.71 KG/M2 | OXYGEN SATURATION: 95 % | SYSTOLIC BLOOD PRESSURE: 122 MMHG | RESPIRATION RATE: 16 BRPM

## 2021-01-01 VITALS
TEMPERATURE: 97.2 F | DIASTOLIC BLOOD PRESSURE: 72 MMHG | BODY MASS INDEX: 25.34 KG/M2 | SYSTOLIC BLOOD PRESSURE: 110 MMHG | WEIGHT: 157 LBS

## 2021-01-01 VITALS
DIASTOLIC BLOOD PRESSURE: 56 MMHG | HEART RATE: 70 BPM | WEIGHT: 163.8 LBS | RESPIRATION RATE: 22 BRPM | BODY MASS INDEX: 26.44 KG/M2 | SYSTOLIC BLOOD PRESSURE: 92 MMHG | TEMPERATURE: 97.3 F

## 2021-01-01 VITALS
DIASTOLIC BLOOD PRESSURE: 60 MMHG | OXYGEN SATURATION: 93 % | HEART RATE: 73 BPM | RESPIRATION RATE: 18 BRPM | BODY MASS INDEX: 24.71 KG/M2 | SYSTOLIC BLOOD PRESSURE: 103 MMHG | HEIGHT: 68 IN | TEMPERATURE: 97.3 F | WEIGHT: 163 LBS

## 2021-01-01 VITALS
SYSTOLIC BLOOD PRESSURE: 153 MMHG | BODY MASS INDEX: 24.71 KG/M2 | TEMPERATURE: 97.8 F | HEART RATE: 70 BPM | DIASTOLIC BLOOD PRESSURE: 81 MMHG | RESPIRATION RATE: 20 BRPM | HEIGHT: 68 IN | OXYGEN SATURATION: 95 % | WEIGHT: 163 LBS

## 2021-01-01 VITALS
DIASTOLIC BLOOD PRESSURE: 64 MMHG | BODY MASS INDEX: 26.79 KG/M2 | WEIGHT: 166 LBS | HEART RATE: 62 BPM | SYSTOLIC BLOOD PRESSURE: 110 MMHG | TEMPERATURE: 97.4 F | OXYGEN SATURATION: 97 %

## 2021-01-01 VITALS
OXYGEN SATURATION: 94 % | HEART RATE: 101 BPM | BODY MASS INDEX: 24.71 KG/M2 | SYSTOLIC BLOOD PRESSURE: 124 MMHG | TEMPERATURE: 97.6 F | DIASTOLIC BLOOD PRESSURE: 82 MMHG | WEIGHT: 163 LBS | HEIGHT: 68 IN | RESPIRATION RATE: 18 BRPM

## 2021-01-01 VITALS
BODY MASS INDEX: 26.31 KG/M2 | SYSTOLIC BLOOD PRESSURE: 110 MMHG | TEMPERATURE: 97.4 F | DIASTOLIC BLOOD PRESSURE: 70 MMHG | WEIGHT: 163 LBS

## 2021-01-01 VITALS
HEART RATE: 88 BPM | WEIGHT: 163 LBS | DIASTOLIC BLOOD PRESSURE: 66 MMHG | TEMPERATURE: 98.2 F | OXYGEN SATURATION: 97 % | HEIGHT: 68 IN | RESPIRATION RATE: 20 BRPM | SYSTOLIC BLOOD PRESSURE: 92 MMHG | BODY MASS INDEX: 24.71 KG/M2

## 2021-01-01 VITALS — TEMPERATURE: 97.9 F

## 2021-01-01 VITALS — TEMPERATURE: 96.9 F

## 2021-01-01 VITALS — TEMPERATURE: 96.8 F

## 2021-01-01 VITALS — TEMPERATURE: 97.5 F

## 2021-01-01 VITALS — TEMPERATURE: 97.4 F

## 2021-01-01 VITALS — TEMPERATURE: 96.6 F

## 2021-01-01 VITALS — TEMPERATURE: 97.8 F

## 2021-01-01 VITALS — TEMPERATURE: 96.4 F

## 2021-01-01 VITALS — TEMPERATURE: 97.1 F

## 2021-01-01 DIAGNOSIS — Z79.01 CURRENT USE OF LONG TERM ANTICOAGULATION: Primary | ICD-10-CM

## 2021-01-01 DIAGNOSIS — I82.493 ACUTE DEEP VEIN THROMBOSIS (DVT) OF OTHER SPECIFIED VEIN OF BOTH LOWER EXTREMITIES (HCC): ICD-10-CM

## 2021-01-01 DIAGNOSIS — R40.0 SOMNOLENCE: ICD-10-CM

## 2021-01-01 DIAGNOSIS — B35.1 TINEA UNGUIUM: ICD-10-CM

## 2021-01-01 DIAGNOSIS — Z79.01 CURRENT USE OF LONG TERM ANTICOAGULATION: ICD-10-CM

## 2021-01-01 DIAGNOSIS — I73.9 PERIPHERAL VASCULAR DISEASE, UNSPECIFIED (HCC): ICD-10-CM

## 2021-01-01 DIAGNOSIS — I42.1 HYPERTROPHIC OBSTRUCTIVE CARDIOMYOPATHY (HCC): ICD-10-CM

## 2021-01-01 DIAGNOSIS — S06.5XAA SDH (SUBDURAL HEMATOMA): Primary | ICD-10-CM

## 2021-01-01 DIAGNOSIS — I50.23 ACUTE ON CHRONIC SYSTOLIC (CONGESTIVE) HEART FAILURE (HCC): ICD-10-CM

## 2021-01-01 DIAGNOSIS — E53.8 B12 DEFICIENCY: ICD-10-CM

## 2021-01-01 DIAGNOSIS — I82.403 DEEP VEIN THROMBOSIS (DVT) OF BOTH LOWER EXTREMITIES, UNSPECIFIED CHRONICITY, UNSPECIFIED VEIN (HCC): ICD-10-CM

## 2021-01-01 DIAGNOSIS — R60.0 LOCALIZED EDEMA: ICD-10-CM

## 2021-01-01 DIAGNOSIS — I82.419 DVT OF DEEP FEMORAL VEIN, UNSPECIFIED LATERALITY (HCC): ICD-10-CM

## 2021-01-01 DIAGNOSIS — I82.403 DEEP VEIN THROMBOSIS (DVT) OF BOTH LOWER EXTREMITIES, UNSPECIFIED CHRONICITY, UNSPECIFIED VEIN (HCC): Primary | ICD-10-CM

## 2021-01-01 DIAGNOSIS — F02.80 LATE ONSET ALZHEIMER'S DISEASE WITHOUT BEHAVIORAL DISTURBANCE (HCC): ICD-10-CM

## 2021-01-01 DIAGNOSIS — B35.1 TINEA UNGUIUM: Primary | ICD-10-CM

## 2021-01-01 DIAGNOSIS — R26.2 DIFFICULTY WALKING: ICD-10-CM

## 2021-01-01 DIAGNOSIS — M79.674 PAIN IN TOE OF RIGHT FOOT: ICD-10-CM
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
## 2021-01-01 DIAGNOSIS — R06.02 SHORT OF BREATH ON EXERTION: ICD-10-CM

## 2021-01-01 DIAGNOSIS — S09.90XA INJURY OF HEAD, INITIAL ENCOUNTER: Primary | ICD-10-CM

## 2021-01-01 DIAGNOSIS — M79.675 PAIN IN LEFT TOE(S): ICD-10-CM

## 2021-01-01 DIAGNOSIS — I10 ESSENTIAL HYPERTENSION: ICD-10-CM

## 2021-01-01 DIAGNOSIS — S09.90XA CLOSED HEAD INJURY, INITIAL ENCOUNTER: ICD-10-CM

## 2021-01-01 DIAGNOSIS — I62.01 ACUTE ON CHRONIC INTRACRANIAL SUBDURAL HEMATOMA (HCC): ICD-10-CM

## 2021-01-01 DIAGNOSIS — I82.493 ACUTE DEEP VEIN THROMBOSIS (DVT) OF OTHER SPECIFIED VEIN OF BOTH LOWER EXTREMITIES (HCC): Primary | ICD-10-CM

## 2021-01-01 DIAGNOSIS — I60.9 SAH (SUBARACHNOID HEMORRHAGE) (HCC): Primary | ICD-10-CM

## 2021-01-01 DIAGNOSIS — R47.81 SLURRED SPEECH: Primary | ICD-10-CM

## 2021-01-01 DIAGNOSIS — R40.0 SOMNOLENCE: Primary | ICD-10-CM

## 2021-01-01 DIAGNOSIS — I10 ESSENTIAL HYPERTENSION: Primary | ICD-10-CM

## 2021-01-01 DIAGNOSIS — I62.03 ACUTE ON CHRONIC INTRACRANIAL SUBDURAL HEMATOMA (HCC): ICD-10-CM

## 2021-01-01 DIAGNOSIS — M79.662 PAIN IN BOTH LOWER LEGS: ICD-10-CM
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
## 2021-01-01 DIAGNOSIS — M79.661 PAIN IN BOTH LOWER LEGS: ICD-10-CM

## 2021-01-01 DIAGNOSIS — S06.5XAA SUBDURAL HEMATOMA: ICD-10-CM

## 2021-01-01 DIAGNOSIS — I82.491 ACUTE DEEP VEIN THROMBOSIS (DVT) OF OTHER SPECIFIED VEIN OF RIGHT LOWER EXTREMITY (HCC): ICD-10-CM

## 2021-01-01 DIAGNOSIS — S22.49XA CLOSED FRACTURE OF MULTIPLE RIBS, UNSPECIFIED LATERALITY, INITIAL ENCOUNTER: ICD-10-CM

## 2021-01-01 DIAGNOSIS — I82.4Z3 ACUTE DEEP VEIN THROMBOSIS (DVT) OF DISTAL VEIN OF BOTH LOWER EXTREMITIES (HCC): ICD-10-CM

## 2021-01-01 DIAGNOSIS — G30.1 LATE ONSET ALZHEIMER'S DISEASE WITHOUT BEHAVIORAL DISTURBANCE (HCC): ICD-10-CM

## 2021-01-01 DIAGNOSIS — U07.1 COVID-19 VIRUS INFECTION: ICD-10-CM

## 2021-01-01 DIAGNOSIS — I60.9 SAH (SUBARACHNOID HEMORRHAGE) (HCC): ICD-10-CM

## 2021-01-01 LAB
ABO/RH: NORMAL
ABO/RH: NORMAL
ALBUMIN SERPL-MCNC: 3.5 G/DL (ref 3.5–5.2)
ALBUMIN SERPL-MCNC: 3.6 G/DL (ref 3.5–5.2)
ALBUMIN SERPL-MCNC: 3.6 G/DL (ref 3.5–5.2)
ALBUMIN SERPL-MCNC: 3.7 G/DL (ref 3.5–5.2)
ALBUMIN SERPL-MCNC: 3.8 G/DL (ref 3.5–5.2)
ALP BLD-CCNC: 100 U/L (ref 40–129)
ALP BLD-CCNC: 106 U/L (ref 40–129)
ALP BLD-CCNC: 60 U/L (ref 40–129)
ALP BLD-CCNC: 62 U/L (ref 40–129)
ALP BLD-CCNC: 63 U/L (ref 40–129)
ALP BLD-CCNC: 68 U/L (ref 40–129)
ALP BLD-CCNC: 70 U/L (ref 40–129)
ALT SERPL-CCNC: 10 U/L (ref 0–40)
ALT SERPL-CCNC: 11 U/L (ref 0–40)
ALT SERPL-CCNC: 13 U/L (ref 0–40)
ALT SERPL-CCNC: 13 U/L (ref 0–40)
ALT SERPL-CCNC: 14 U/L (ref 0–40)
ALT SERPL-CCNC: 14 U/L (ref 0–40)
ALT SERPL-CCNC: 15 U/L (ref 0–40)
ANGLE (CLOT STRENGTH): 69.9 DEGREE (ref 59–74)
ANGLE (CLOT STRENGTH): 73.2 DEGREE (ref 59–74)
ANION GAP SERPL CALCULATED.3IONS-SCNC: 10 MMOL/L (ref 7–16)
ANION GAP SERPL CALCULATED.3IONS-SCNC: 10 MMOL/L (ref 7–16)
ANION GAP SERPL CALCULATED.3IONS-SCNC: 11 MMOL/L (ref 7–16)
ANION GAP SERPL CALCULATED.3IONS-SCNC: 12 MMOL/L (ref 7–16)
ANION GAP SERPL CALCULATED.3IONS-SCNC: 12 MMOL/L (ref 7–16)
ANION GAP SERPL CALCULATED.3IONS-SCNC: 8 MMOL/L (ref 7–16)
ANISOCYTOSIS: ABNORMAL
ANTIBODY SCREEN: NORMAL
ANTIBODY SCREEN: NORMAL
APTT: 27.9 SEC (ref 24.5–35.1)
AST SERPL-CCNC: 11 U/L (ref 0–39)
AST SERPL-CCNC: 13 U/L (ref 0–39)
AST SERPL-CCNC: 15 U/L (ref 0–39)
AST SERPL-CCNC: 15 U/L (ref 0–39)
AST SERPL-CCNC: 16 U/L (ref 0–39)
BACTERIA: NORMAL /HPF
BASOPHILS ABSOLUTE: 0 E9/L (ref 0–0.2)
BASOPHILS ABSOLUTE: 0.02 E9/L (ref 0–0.2)
BASOPHILS ABSOLUTE: 0.03 E9/L (ref 0–0.2)
BASOPHILS ABSOLUTE: 0.03 E9/L (ref 0–0.2)
BASOPHILS RELATIVE PERCENT: 0.2 % (ref 0–2)
BASOPHILS RELATIVE PERCENT: 0.2 % (ref 0–2)
BASOPHILS RELATIVE PERCENT: 0.3 % (ref 0–2)
BASOPHILS RELATIVE PERCENT: 0.4 % (ref 0–2)
BASOPHILS RELATIVE PERCENT: 0.4 % (ref 0–2)
BASOPHILS RELATIVE PERCENT: 0.5 % (ref 0–2)
BILIRUB SERPL-MCNC: 0.4 MG/DL (ref 0–1.2)
BILIRUB SERPL-MCNC: 0.5 MG/DL (ref 0–1.2)
BILIRUB SERPL-MCNC: 0.8 MG/DL (ref 0–1.2)
BILIRUB SERPL-MCNC: 0.9 MG/DL (ref 0–1.2)
BILIRUB SERPL-MCNC: 0.9 MG/DL (ref 0–1.2)
BILIRUBIN DIRECT: <0.2 MG/DL (ref 0–0.3)
BILIRUBIN URINE: NEGATIVE
BILIRUBIN, INDIRECT: ABNORMAL MG/DL (ref 0–1)
BLOOD, URINE: ABNORMAL
BUN BLDV-MCNC: 11 MG/DL (ref 6–23)
BUN BLDV-MCNC: 13 MG/DL (ref 6–23)
BUN BLDV-MCNC: 14 MG/DL (ref 6–23)
BUN BLDV-MCNC: 16 MG/DL (ref 6–23)
BUN BLDV-MCNC: 16 MG/DL (ref 6–23)
BUN BLDV-MCNC: 16 MG/DL (ref 8–23)
BUN BLDV-MCNC: 17 MG/DL (ref 6–23)
BUN BLDV-MCNC: 19 MG/DL (ref 6–23)
BURR CELLS: ABNORMAL
CALCIUM IONIZED: 1.25 MMOL/L (ref 1.15–1.33)
CALCIUM SERPL-MCNC: 8.9 MG/DL (ref 8.6–10.2)
CALCIUM SERPL-MCNC: 9 MG/DL (ref 8.6–10.2)
CALCIUM SERPL-MCNC: 9.1 MG/DL (ref 8.6–10.2)
CALCIUM SERPL-MCNC: 9.2 MG/DL (ref 8.6–10.2)
CALCIUM SERPL-MCNC: 9.3 MG/DL (ref 8.6–10.2)
CALCIUM SERPL-MCNC: 9.4 MG/DL (ref 8.6–10.2)
CHLORIDE BLD-SCNC: 102 MMOL/L (ref 98–107)
CHLORIDE BLD-SCNC: 103 MMOL/L (ref 98–107)
CHLORIDE BLD-SCNC: 103 MMOL/L (ref 98–107)
CHLORIDE BLD-SCNC: 104 MMOL/L (ref 98–107)
CHLORIDE BLD-SCNC: 106 MMOL/L (ref 98–107)
CHLORIDE BLD-SCNC: 107 MMOL/L (ref 98–107)
CHLORIDE BLD-SCNC: 107 MMOL/L (ref 98–107)
CHLORIDE BLD-SCNC: 108 MMOL/L (ref 98–107)
CLARITY: ABNORMAL
CO2: 22 MMOL/L (ref 22–29)
CO2: 23 MMOL/L (ref 22–29)
CO2: 24 MMOL/L (ref 22–29)
CO2: 26 MMOL/L (ref 22–29)
CO2: 26 MMOL/L (ref 22–29)
CO2: 27 MMOL/L (ref 22–29)
COLOR: ABNORMAL
CREAT SERPL-MCNC: 0.8 MG/DL (ref 0.7–1.2)
CREAT SERPL-MCNC: 0.8 MG/DL (ref 0.7–1.2)
CREAT SERPL-MCNC: 0.9 MG/DL (ref 0.7–1.2)
CREAT SERPL-MCNC: 1 MG/DL (ref 0.7–1.2)
CREAT SERPL-MCNC: 1.1 MG/DL (ref 0.7–1.2)
CREAT SERPL-MCNC: 1.2 MG/DL (ref 0.7–1.2)
EKG ATRIAL RATE: 85 BPM
EKG P-R INTERVAL: 280 MS
EKG Q-T INTERVAL: 454 MS
EKG QRS DURATION: 188 MS
EKG QTC CALCULATION (BAZETT): 540 MS
EKG R AXIS: -62 DEGREES
EKG T AXIS: 106 DEGREES
EKG VENTRICULAR RATE: 85 BPM
EOSINOPHILS ABSOLUTE: 0 E9/L (ref 0.05–0.5)
EOSINOPHILS ABSOLUTE: 0.03 E9/L (ref 0.05–0.5)
EOSINOPHILS ABSOLUTE: 0.16 E9/L (ref 0.05–0.5)
EOSINOPHILS ABSOLUTE: 0.19 E9/L (ref 0.05–0.5)
EOSINOPHILS ABSOLUTE: 0.2 E9/L (ref 0.05–0.5)
EOSINOPHILS ABSOLUTE: 0.22 E9/L (ref 0.05–0.5)
EOSINOPHILS RELATIVE PERCENT: 0.2 % (ref 0–6)
EOSINOPHILS RELATIVE PERCENT: 0.4 % (ref 0–6)
EOSINOPHILS RELATIVE PERCENT: 2 % (ref 0–6)
EOSINOPHILS RELATIVE PERCENT: 2.5 % (ref 0–6)
EOSINOPHILS RELATIVE PERCENT: 3.1 % (ref 0–6)
EOSINOPHILS RELATIVE PERCENT: 3.1 % (ref 0–6)
EPL-TEG: 0.4 % (ref 0–15)
EPL-TEG: 1 % (ref 0–15)
G-TEG: 7 K D/SC (ref 4.5–11)
G-TEG: 8.3 K D/SC (ref 4.5–11)
GFR AFRICAN AMERICAN: >60
GFR NON-AFRICAN AMERICAN: 58 ML/MIN/1.73
GFR NON-AFRICAN AMERICAN: >60 ML/MIN/1.73
GLUCOSE BLD-MCNC: 104 MG/DL (ref 74–99)
GLUCOSE BLD-MCNC: 116 MG/DL (ref 74–99)
GLUCOSE BLD-MCNC: 124 MG/DL
GLUCOSE BLD-MCNC: 143 MG/DL (ref 74–99)
GLUCOSE BLD-MCNC: 166 MG/DL (ref 74–99)
GLUCOSE BLD-MCNC: 95 MG/DL (ref 74–99)
GLUCOSE BLD-MCNC: 95 MG/DL (ref 74–99)
GLUCOSE BLD-MCNC: 97 MG/DL (ref 74–99)
GLUCOSE BLD-MCNC: 97 MG/DL (ref 74–99)
GLUCOSE URINE: NEGATIVE MG/DL
HCT VFR BLD CALC: 35.7 % (ref 37–54)
HCT VFR BLD CALC: 36 % (ref 37–54)
HCT VFR BLD CALC: 37.6 % (ref 37–54)
HCT VFR BLD CALC: 38.4 % (ref 37–54)
HCT VFR BLD CALC: 38.6 % (ref 37–54)
HCT VFR BLD CALC: 38.8 % (ref 37–54)
HCT VFR BLD CALC: 42.7 % (ref 37–54)
HCT VFR BLD CALC: 44.1 % (ref 37–54)
HEMOGLOBIN: 11.8 G/DL (ref 12.5–16.5)
HEMOGLOBIN: 11.8 G/DL (ref 12.5–16.5)
HEMOGLOBIN: 11.9 G/DL (ref 12.5–16.5)
HEMOGLOBIN: 12.2 G/DL (ref 12.5–16.5)
HEMOGLOBIN: 12.3 G/DL (ref 12.5–16.5)
HEMOGLOBIN: 12.4 G/DL (ref 12.5–16.5)
HEMOGLOBIN: 13.6 G/DL (ref 12.5–16.5)
HEMOGLOBIN: 14 G/DL (ref 12.5–16.5)
IMMATURE GRANULOCYTES #: 0.07 E9/L
IMMATURE GRANULOCYTES #: 0.07 E9/L
IMMATURE GRANULOCYTES #: 0.08 E9/L
IMMATURE GRANULOCYTES #: 0.1 E9/L
IMMATURE GRANULOCYTES #: 0.35 E9/L
IMMATURE GRANULOCYTES %: 0.9 % (ref 0–5)
IMMATURE GRANULOCYTES %: 1.1 % (ref 0–5)
IMMATURE GRANULOCYTES %: 1.1 % (ref 0–5)
IMMATURE GRANULOCYTES %: 1.3 % (ref 0–5)
IMMATURE GRANULOCYTES %: 4.4 % (ref 0–5)
INR BLD: 1.1
INR BLD: 1.2
INR BLD: 1.3
INR BLD: 1.5
INR BLD: 1.6
INR BLD: 1.7
INR BLD: 1.9
INR BLD: 2.1
INR BLD: 2.5
INR BLD: 2.8
INR BLD: 3
INR BLD: 3.8
INR BLD: 5.7
INR BLD: 6.1
INR BLD: 7.5
K (CLOTTING TIME): 1.2 MIN (ref 1–3)
K (CLOTTING TIME): 1.5 MIN (ref 1–3)
KETONES, URINE: ABNORMAL MG/DL
LACTIC ACID: 4.4 MMOL/L (ref 0.5–2.2)
LEUKOCYTE ESTERASE, URINE: NEGATIVE
LY30 (FIBRINOLYSIS): 0.4 % (ref 0–8)
LY30 (FIBRINOLYSIS): 1 % (ref 0–8)
LYMPHOCYTES ABSOLUTE: 0.42 E9/L (ref 1.5–4)
LYMPHOCYTES ABSOLUTE: 0.57 E9/L (ref 1.5–4)
LYMPHOCYTES ABSOLUTE: 0.58 E9/L (ref 1.5–4)
LYMPHOCYTES ABSOLUTE: 0.62 E9/L (ref 1.5–4)
LYMPHOCYTES ABSOLUTE: 0.63 E9/L (ref 1.5–4)
LYMPHOCYTES ABSOLUTE: 0.68 E9/L (ref 1.5–4)
LYMPHOCYTES RELATIVE PERCENT: 4.3 % (ref 20–42)
LYMPHOCYTES RELATIVE PERCENT: 7.1 % (ref 20–42)
LYMPHOCYTES RELATIVE PERCENT: 8.2 % (ref 20–42)
LYMPHOCYTES RELATIVE PERCENT: 8.2 % (ref 20–42)
LYMPHOCYTES RELATIVE PERCENT: 8.4 % (ref 20–42)
LYMPHOCYTES RELATIVE PERCENT: 9.7 % (ref 20–42)
MA (MAX AMPLITUDE): 58.5 MM (ref 50–70)
MA (MAX AMPLITUDE): 62.5 MM (ref 50–70)
MAGNESIUM: 1.9 MG/DL (ref 1.6–2.6)
MAGNESIUM: 1.9 MG/DL (ref 1.6–2.6)
MAGNESIUM: 2.2 MG/DL (ref 1.6–2.6)
MCH RBC QN AUTO: 31 PG (ref 26–35)
MCH RBC QN AUTO: 31.1 PG (ref 26–35)
MCH RBC QN AUTO: 31.5 PG (ref 26–35)
MCH RBC QN AUTO: 31.6 PG (ref 26–35)
MCH RBC QN AUTO: 31.7 PG (ref 26–35)
MCH RBC QN AUTO: 31.9 PG (ref 26–35)
MCHC RBC AUTO-ENTMCNC: 31.6 % (ref 32–34.5)
MCHC RBC AUTO-ENTMCNC: 31.7 % (ref 32–34.5)
MCHC RBC AUTO-ENTMCNC: 31.8 % (ref 32–34.5)
MCHC RBC AUTO-ENTMCNC: 31.9 % (ref 32–34.5)
MCHC RBC AUTO-ENTMCNC: 31.9 % (ref 32–34.5)
MCHC RBC AUTO-ENTMCNC: 32 % (ref 32–34.5)
MCHC RBC AUTO-ENTMCNC: 32.8 % (ref 32–34.5)
MCHC RBC AUTO-ENTMCNC: 33.1 % (ref 32–34.5)
MCV RBC AUTO: 100 FL (ref 80–99.9)
MCV RBC AUTO: 100 FL (ref 80–99.9)
MCV RBC AUTO: 100.2 FL (ref 80–99.9)
MCV RBC AUTO: 96.5 FL (ref 80–99.9)
MCV RBC AUTO: 96.8 FL (ref 80–99.9)
MCV RBC AUTO: 97.2 FL (ref 80–99.9)
MCV RBC AUTO: 97.7 FL (ref 80–99.9)
MCV RBC AUTO: 99.1 FL (ref 80–99.9)
METER GLUCOSE: 124 MG/DL (ref 74–99)
MONOCYTES ABSOLUTE: 0.31 E9/L (ref 0.1–0.95)
MONOCYTES ABSOLUTE: 0.48 E9/L (ref 0.1–0.95)
MONOCYTES ABSOLUTE: 0.61 E9/L (ref 0.1–0.95)
MONOCYTES ABSOLUTE: 0.64 E9/L (ref 0.1–0.95)
MONOCYTES ABSOLUTE: 0.75 E9/L (ref 0.1–0.95)
MONOCYTES ABSOLUTE: 0.83 E9/L (ref 0.1–0.95)
MONOCYTES RELATIVE PERCENT: 10 % (ref 2–12)
MONOCYTES RELATIVE PERCENT: 10.2 % (ref 2–12)
MONOCYTES RELATIVE PERCENT: 10.6 % (ref 2–12)
MONOCYTES RELATIVE PERCENT: 3.4 % (ref 2–12)
MONOCYTES RELATIVE PERCENT: 6 % (ref 2–12)
MONOCYTES RELATIVE PERCENT: 7.9 % (ref 2–12)
NEUTROPHILS ABSOLUTE: 4.85 E9/L (ref 1.8–7.3)
NEUTROPHILS ABSOLUTE: 5.39 E9/L (ref 1.8–7.3)
NEUTROPHILS ABSOLUTE: 6.15 E9/L (ref 1.8–7.3)
NEUTROPHILS ABSOLUTE: 6.34 E9/L (ref 1.8–7.3)
NEUTROPHILS ABSOLUTE: 6.59 E9/L (ref 1.8–7.3)
NEUTROPHILS ABSOLUTE: 9.57 E9/L (ref 1.8–7.3)
NEUTROPHILS RELATIVE PERCENT: 75.8 % (ref 43–80)
NEUTROPHILS RELATIVE PERCENT: 76.6 % (ref 43–80)
NEUTROPHILS RELATIVE PERCENT: 78.3 % (ref 43–80)
NEUTROPHILS RELATIVE PERCENT: 79.7 % (ref 43–80)
NEUTROPHILS RELATIVE PERCENT: 81.9 % (ref 43–80)
NEUTROPHILS RELATIVE PERCENT: 92.3 % (ref 43–80)
NITRITE, URINE: NEGATIVE
OVALOCYTES: ABNORMAL
PDW BLD-RTO: 14.6 FL (ref 11.5–15)
PDW BLD-RTO: 14.7 FL (ref 11.5–15)
PDW BLD-RTO: 14.9 FL (ref 11.5–15)
PDW BLD-RTO: 15 FL (ref 11.5–15)
PH UA: 6 (ref 5–9)
PHOSPHORUS: 2.7 MG/DL (ref 2.5–4.5)
PHOSPHORUS: 2.7 MG/DL (ref 2.5–4.5)
PHOSPHORUS: 2.9 MG/DL (ref 2.5–4.5)
PHOSPHORUS: 3 MG/DL (ref 2.5–4.5)
PLATELET # BLD: 146 E9/L (ref 130–450)
PLATELET # BLD: 153 E9/L (ref 130–450)
PLATELET # BLD: 160 E9/L (ref 130–450)
PLATELET # BLD: 169 E9/L (ref 130–450)
PLATELET # BLD: 176 E9/L (ref 130–450)
PLATELET # BLD: 179 E9/L (ref 130–450)
PLATELET # BLD: 192 E9/L (ref 130–450)
PLATELET # BLD: 195 E9/L (ref 130–450)
PMV BLD AUTO: 9 FL (ref 7–12)
PMV BLD AUTO: 9.1 FL (ref 7–12)
PMV BLD AUTO: 9.3 FL (ref 7–12)
PMV BLD AUTO: 9.4 FL (ref 7–12)
PMV BLD AUTO: 9.5 FL (ref 7–12)
PMV BLD AUTO: 9.7 FL (ref 7–12)
POIKILOCYTES: ABNORMAL
POTASSIUM REFLEX MAGNESIUM: 4.2 MMOL/L (ref 3.5–5)
POTASSIUM REFLEX MAGNESIUM: 4.5 MMOL/L (ref 3.5–5)
POTASSIUM REFLEX MAGNESIUM: 4.9 MMOL/L (ref 3.5–5)
POTASSIUM SERPL-SCNC: 4.1 MMOL/L (ref 3.5–5)
POTASSIUM SERPL-SCNC: 4.2 MMOL/L (ref 3.5–5)
POTASSIUM SERPL-SCNC: 4.5 MMOL/L (ref 3.5–5)
PRO-BNP: 663 PG/ML (ref 0–450)
PRO-BNP: 911 PG/ML (ref 0–450)
PROTEIN UA: 30 MG/DL
PROTHROMBIN TIME: 12.2 SEC (ref 9.3–12.4)
PROTHROMBIN TIME: 12.2 SEC (ref 9.3–12.4)
PROTHROMBIN TIME: 12.3 SEC (ref 9.3–12.4)
PROTHROMBIN TIME: 13.4 SEC (ref 9.3–12.4)
PROTHROMBIN TIME: 14 SEC (ref 9.3–12.4)
PROTHROMBIN TIME: 14 SEC (ref 9.3–12.4)
PROTHROMBIN TIME: 14.3 SEC (ref 9.3–12.4)
PROTHROMBIN TIME: 16.3 SEC (ref 9.3–12.4)
PROTHROMBIN TIME: 16.7 SEC (ref 9.3–12.4)
PROTHROMBIN TIME: 17 SEC (ref 9.3–12.4)
PROTHROMBIN TIME: 17.5 SEC (ref 9.3–12.4)
PROTHROMBIN TIME: 18.8 SEC (ref 9.3–12.4)
PROTHROMBIN TIME: 20.8 SEC (ref 9.3–12.4)
PROTHROMBIN TIME: 23 SEC (ref 9.3–12.4)
PROTHROMBIN TIME: 27.6 SEC (ref 9.3–12.4)
PROTHROMBIN TIME: 30.8 SEC (ref 9.3–12.4)
PROTHROMBIN TIME: 32.7 SEC (ref 9.3–12.4)
PROTHROMBIN TIME: 43.8 SEC (ref 9.3–12.4)
PROTHROMBIN TIME: 66.4 SEC (ref 9.3–12.4)
PROTHROMBIN TIME: 66.9 SEC (ref 9.3–12.4)
PROTHROMBIN TIME: 81.4 SEC (ref 9.3–12.4)
R (REACTION TIME): 3.2 MIN (ref 5–10)
R (REACTION TIME): 4 MIN (ref 5–10)
RBC # BLD: 3.7 E12/L (ref 3.8–5.8)
RBC # BLD: 3.72 E12/L (ref 3.8–5.8)
RBC # BLD: 3.76 E12/L (ref 3.8–5.8)
RBC # BLD: 3.86 E12/L (ref 3.8–5.8)
RBC # BLD: 3.93 E12/L (ref 3.8–5.8)
RBC # BLD: 3.99 E12/L (ref 3.8–5.8)
RBC # BLD: 4.26 E12/L (ref 3.8–5.8)
RBC # BLD: 4.45 E12/L (ref 3.8–5.8)
RBC UA: >20 /HPF (ref 0–2)
SARS-COV-2, NAAT: DETECTED
SARS-COV-2, NAAT: NOT DETECTED
SODIUM BLD-SCNC: 136 MMOL/L (ref 132–146)
SODIUM BLD-SCNC: 138 MMOL/L (ref 132–146)
SODIUM BLD-SCNC: 139 MMOL/L (ref 132–146)
SODIUM BLD-SCNC: 139 MMOL/L (ref 132–146)
SODIUM BLD-SCNC: 141 MMOL/L (ref 132–146)
SODIUM BLD-SCNC: 141 MMOL/L (ref 132–146)
SODIUM BLD-SCNC: 142 MMOL/L (ref 132–146)
SODIUM BLD-SCNC: 143 MMOL/L (ref 132–146)
SPECIFIC GRAVITY UA: >=1.03 (ref 1–1.03)
TOTAL PROTEIN: 5.8 G/DL (ref 6.4–8.3)
TOTAL PROTEIN: 5.8 G/DL (ref 6.4–8.3)
TOTAL PROTEIN: 5.9 G/DL (ref 6.4–8.3)
TOTAL PROTEIN: 5.9 G/DL (ref 6.4–8.3)
TOTAL PROTEIN: 6 G/DL (ref 6.4–8.3)
TOTAL PROTEIN: 6 G/DL (ref 6.4–8.3)
TOTAL PROTEIN: 6.3 G/DL (ref 6.4–8.3)
TROPONIN, HIGH SENSITIVITY: 69 NG/L (ref 0–11)
TROPONIN, HIGH SENSITIVITY: 85 NG/L (ref 0–11)
TSH SERPL DL<=0.05 MIU/L-ACNC: 2.73 UIU/ML (ref 0.27–4.2)
UROBILINOGEN, URINE: 1 E.U./DL
VITAMIN B-12: 1072 PG/ML (ref 211–946)
WBC # BLD: 10.4 E9/L (ref 4.5–11.5)
WBC # BLD: 6.4 E9/L (ref 4.5–11.5)
WBC # BLD: 6.9 E9/L (ref 4.5–11.5)
WBC # BLD: 7.1 E9/L (ref 4.5–11.5)
WBC # BLD: 7.6 E9/L (ref 4.5–11.5)
WBC # BLD: 7.7 E9/L (ref 4.5–11.5)
WBC # BLD: 8 E9/L (ref 4.5–11.5)
WBC # BLD: 8.1 E9/L (ref 4.5–11.5)
WBC UA: NORMAL /HPF (ref 0–5)

## 2021-01-01 PROCEDURE — 6370000000 HC RX 637 (ALT 250 FOR IP): Performed by: STUDENT IN AN ORGANIZED HEALTH CARE EDUCATION/TRAINING PROGRAM

## 2021-01-01 PROCEDURE — 11721 DEBRIDE NAIL 6 OR MORE: CPT | Performed by: PODIATRIST

## 2021-01-01 PROCEDURE — 71045 X-RAY EXAM CHEST 1 VIEW: CPT

## 2021-01-01 PROCEDURE — G8427 DOCREV CUR MEDS BY ELIG CLIN: HCPCS | Performed by: INTERNAL MEDICINE

## 2021-01-01 PROCEDURE — 86850 RBC ANTIBODY SCREEN: CPT

## 2021-01-01 PROCEDURE — 1123F ACP DISCUSS/DSCN MKR DOCD: CPT | Performed by: NEUROLOGICAL SURGERY

## 2021-01-01 PROCEDURE — 85610 PROTHROMBIN TIME: CPT

## 2021-01-01 PROCEDURE — 99285 EMERGENCY DEPT VISIT HI MDM: CPT | Performed by: SURGERY

## 2021-01-01 PROCEDURE — 93005 ELECTROCARDIOGRAM TRACING: CPT | Performed by: EMERGENCY MEDICINE

## 2021-01-01 PROCEDURE — 36415 COLL VENOUS BLD VENIPUNCTURE: CPT | Performed by: INTERNAL MEDICINE

## 2021-01-01 PROCEDURE — 2700000000 HC OXYGEN THERAPY PER DAY

## 2021-01-01 PROCEDURE — 74177 CT ABD & PELVIS W/CONTRAST: CPT

## 2021-01-01 PROCEDURE — 93970 EXTREMITY STUDY: CPT | Performed by: RADIOLOGY

## 2021-01-01 PROCEDURE — 80053 COMPREHEN METABOLIC PANEL: CPT

## 2021-01-01 PROCEDURE — 85025 COMPLETE CBC W/AUTO DIFF WBC: CPT

## 2021-01-01 PROCEDURE — 93970 EXTREMITY STUDY: CPT

## 2021-01-01 PROCEDURE — 6360000002 HC RX W HCPCS: Performed by: STUDENT IN AN ORGANIZED HEALTH CARE EDUCATION/TRAINING PROGRAM

## 2021-01-01 PROCEDURE — G8417 CALC BMI ABV UP PARAM F/U: HCPCS | Performed by: INTERNAL MEDICINE

## 2021-01-01 PROCEDURE — 2580000003 HC RX 258: Performed by: STUDENT IN AN ORGANIZED HEALTH CARE EDUCATION/TRAINING PROGRAM

## 2021-01-01 PROCEDURE — 70450 CT HEAD/BRAIN W/O DYE: CPT

## 2021-01-01 PROCEDURE — 73502 X-RAY EXAM HIP UNI 2-3 VIEWS: CPT

## 2021-01-01 PROCEDURE — 85576 BLOOD PLATELET AGGREGATION: CPT

## 2021-01-01 PROCEDURE — 83735 ASSAY OF MAGNESIUM: CPT

## 2021-01-01 PROCEDURE — 97530 THERAPEUTIC ACTIVITIES: CPT

## 2021-01-01 PROCEDURE — 86901 BLOOD TYPING SEROLOGIC RH(D): CPT

## 2021-01-01 PROCEDURE — 84100 ASSAY OF PHOSPHORUS: CPT

## 2021-01-01 PROCEDURE — 85347 COAGULATION TIME ACTIVATED: CPT

## 2021-01-01 PROCEDURE — 87635 SARS-COV-2 COVID-19 AMP PRB: CPT

## 2021-01-01 PROCEDURE — 86900 BLOOD TYPING SEROLOGIC ABO: CPT

## 2021-01-01 PROCEDURE — 90715 TDAP VACCINE 7 YRS/> IM: CPT | Performed by: EMERGENCY MEDICINE

## 2021-01-01 PROCEDURE — 1036F TOBACCO NON-USER: CPT | Performed by: NEUROLOGICAL SURGERY

## 2021-01-01 PROCEDURE — 99222 1ST HOSP IP/OBS MODERATE 55: CPT | Performed by: NEUROLOGICAL SURGERY

## 2021-01-01 PROCEDURE — 85384 FIBRINOGEN ACTIVITY: CPT

## 2021-01-01 PROCEDURE — G8427 DOCREV CUR MEDS BY ELIG CLIN: HCPCS | Performed by: NEUROLOGICAL SURGERY

## 2021-01-01 PROCEDURE — 70486 CT MAXILLOFACIAL W/O DYE: CPT

## 2021-01-01 PROCEDURE — 36415 COLL VENOUS BLD VENIPUNCTURE: CPT

## 2021-01-01 PROCEDURE — 4040F PNEUMOC VAC/ADMIN/RCVD: CPT | Performed by: NEUROLOGICAL SURGERY

## 2021-01-01 PROCEDURE — 1036F TOBACCO NON-USER: CPT | Performed by: PODIATRIST

## 2021-01-01 PROCEDURE — 99213 OFFICE O/P EST LOW 20 MIN: CPT | Performed by: NEUROLOGICAL SURGERY

## 2021-01-01 PROCEDURE — G8417 CALC BMI ABV UP PARAM F/U: HCPCS | Performed by: PODIATRIST

## 2021-01-01 PROCEDURE — 80076 HEPATIC FUNCTION PANEL: CPT

## 2021-01-01 PROCEDURE — 99285 EMERGENCY DEPT VISIT HI MDM: CPT

## 2021-01-01 PROCEDURE — 99232 SBSQ HOSP IP/OBS MODERATE 35: CPT | Performed by: SURGERY

## 2021-01-01 PROCEDURE — 99238 HOSP IP/OBS DSCHRG MGMT 30/<: CPT | Performed by: SURGERY

## 2021-01-01 PROCEDURE — G8427 DOCREV CUR MEDS BY ELIG CLIN: HCPCS | Performed by: PODIATRIST

## 2021-01-01 PROCEDURE — 85730 THROMBOPLASTIN TIME PARTIAL: CPT

## 2021-01-01 PROCEDURE — 96365 THER/PROPH/DIAG IV INF INIT: CPT

## 2021-01-01 PROCEDURE — 99214 OFFICE O/P EST MOD 30 MIN: CPT | Performed by: PODIATRIST

## 2021-01-01 PROCEDURE — 73030 X-RAY EXAM OF SHOULDER: CPT

## 2021-01-01 PROCEDURE — 99212 OFFICE O/P EST SF 10 MIN: CPT | Performed by: INTERNAL MEDICINE

## 2021-01-01 PROCEDURE — 6360000002 HC RX W HCPCS: Performed by: EMERGENCY MEDICINE

## 2021-01-01 PROCEDURE — 80048 BASIC METABOLIC PNL TOTAL CA: CPT

## 2021-01-01 PROCEDURE — 72125 CT NECK SPINE W/O DYE: CPT

## 2021-01-01 PROCEDURE — 51798 US URINE CAPACITY MEASURE: CPT

## 2021-01-01 PROCEDURE — 99284 EMERGENCY DEPT VISIT MOD MDM: CPT

## 2021-01-01 PROCEDURE — 4040F PNEUMOC VAC/ADMIN/RCVD: CPT | Performed by: PODIATRIST

## 2021-01-01 PROCEDURE — 82330 ASSAY OF CALCIUM: CPT

## 2021-01-01 PROCEDURE — 1123F ACP DISCUSS/DSCN MKR DOCD: CPT | Performed by: PODIATRIST

## 2021-01-01 PROCEDURE — G8420 CALC BMI NORM PARAMETERS: HCPCS | Performed by: NEUROLOGICAL SURGERY

## 2021-01-01 PROCEDURE — 99221 1ST HOSP IP/OBS SF/LOW 40: CPT | Performed by: FAMILY MEDICINE

## 2021-01-01 PROCEDURE — 96372 THER/PROPH/DIAG INJ SC/IM: CPT

## 2021-01-01 PROCEDURE — 93971 EXTREMITY STUDY: CPT | Performed by: RADIOLOGY

## 2021-01-01 PROCEDURE — 2000000000 HC ICU R&B

## 2021-01-01 PROCEDURE — 97166 OT EVAL MOD COMPLEX 45 MIN: CPT

## 2021-01-01 PROCEDURE — 6360000004 HC RX CONTRAST MEDICATION: Performed by: RADIOLOGY

## 2021-01-01 PROCEDURE — 90471 IMMUNIZATION ADMIN: CPT | Performed by: EMERGENCY MEDICINE

## 2021-01-01 PROCEDURE — 4040F PNEUMOC VAC/ADMIN/RCVD: CPT | Performed by: INTERNAL MEDICINE

## 2021-01-01 PROCEDURE — 2060000000 HC ICU INTERMEDIATE R&B

## 2021-01-01 PROCEDURE — G0378 HOSPITAL OBSERVATION PER HR: HCPCS

## 2021-01-01 PROCEDURE — 99233 SBSQ HOSP IP/OBS HIGH 50: CPT | Performed by: SURGERY

## 2021-01-01 PROCEDURE — 97162 PT EVAL MOD COMPLEX 30 MIN: CPT

## 2021-01-01 PROCEDURE — 96368 THER/DIAG CONCURRENT INF: CPT

## 2021-01-01 PROCEDURE — 72170 X-RAY EXAM OF PELVIS: CPT

## 2021-01-01 PROCEDURE — 1123F ACP DISCUSS/DSCN MKR DOCD: CPT | Performed by: INTERNAL MEDICINE

## 2021-01-01 PROCEDURE — 82962 GLUCOSE BLOOD TEST: CPT

## 2021-01-01 PROCEDURE — 71250 CT THORAX DX C-: CPT

## 2021-01-01 PROCEDURE — 81001 URINALYSIS AUTO W/SCOPE: CPT

## 2021-01-01 PROCEDURE — 84484 ASSAY OF TROPONIN QUANT: CPT

## 2021-01-01 PROCEDURE — 1036F TOBACCO NON-USER: CPT | Performed by: INTERNAL MEDICINE

## 2021-01-01 PROCEDURE — 87040 BLOOD CULTURE FOR BACTERIA: CPT

## 2021-01-01 PROCEDURE — 83605 ASSAY OF LACTIC ACID: CPT

## 2021-01-01 RX ORDER — LEVETIRACETAM 500 MG/1
500 TABLET ORAL 2 TIMES DAILY
Qty: 8 TABLET | Refills: 0 | Status: SHIPPED | OUTPATIENT
Start: 2021-01-01

## 2021-01-01 RX ORDER — LEVETIRACETAM 5 MG/ML
500 INJECTION INTRAVASCULAR EVERY 12 HOURS
Status: DISCONTINUED | OUTPATIENT
Start: 2021-01-01 | End: 2021-01-01

## 2021-01-01 RX ORDER — SODIUM CHLORIDE 9 MG/ML
50 INJECTION, SOLUTION INTRAVENOUS ONCE
Status: DISCONTINUED | OUTPATIENT
Start: 2021-01-01 | End: 2021-01-01

## 2021-01-01 RX ORDER — RANOLAZINE 500 MG/1
500 TABLET, EXTENDED RELEASE ORAL DAILY
Qty: 90 TABLET | Refills: 3 | Status: SHIPPED | OUTPATIENT
Start: 2021-01-01

## 2021-01-01 RX ORDER — RIVASTIGMINE TARTRATE 3 MG/1
3 CAPSULE ORAL 2 TIMES DAILY
Status: DISCONTINUED | OUTPATIENT
Start: 2021-01-01 | End: 2021-01-01 | Stop reason: HOSPADM

## 2021-01-01 RX ORDER — RIVASTIGMINE TARTRATE 3 MG/1
3 CAPSULE ORAL 2 TIMES DAILY
Qty: 180 CAPSULE | Refills: 3 | Status: SHIPPED | OUTPATIENT
Start: 2021-01-01

## 2021-01-01 RX ORDER — RIVASTIGMINE TARTRATE 3 MG/1
3 CAPSULE ORAL 2 TIMES DAILY
Qty: 60 CAPSULE | Refills: 5 | Status: SHIPPED | OUTPATIENT
Start: 2021-01-01 | End: 2021-01-01 | Stop reason: SDUPTHER

## 2021-01-01 RX ORDER — LABETALOL 100 MG/1
TABLET, FILM COATED ORAL
Qty: 180 TABLET | Refills: 3 | Status: SHIPPED | OUTPATIENT
Start: 2021-01-01

## 2021-01-01 RX ORDER — OXYCODONE HYDROCHLORIDE 10 MG/1
10 TABLET ORAL EVERY 4 HOURS PRN
Status: DISCONTINUED | OUTPATIENT
Start: 2021-01-01 | End: 2021-01-01

## 2021-01-01 RX ORDER — LIDOCAINE 4 G/G
1 PATCH TOPICAL DAILY
Status: DISCONTINUED | OUTPATIENT
Start: 2021-01-01 | End: 2021-01-01 | Stop reason: HOSPADM

## 2021-01-01 RX ORDER — SODIUM CHLORIDE 0.9 % (FLUSH) 0.9 %
10 SYRINGE (ML) INJECTION EVERY 12 HOURS SCHEDULED
Status: DISCONTINUED | OUTPATIENT
Start: 2021-01-01 | End: 2021-01-01 | Stop reason: HOSPADM

## 2021-01-01 RX ORDER — PHYTONADIONE 10 MG/ML
10 INJECTION, EMULSION INTRAMUSCULAR; INTRAVENOUS; SUBCUTANEOUS ONCE
Status: COMPLETED | OUTPATIENT
Start: 2021-01-01 | End: 2021-01-01

## 2021-01-01 RX ORDER — RIVASTIGMINE TARTRATE 3 MG/1
3 CAPSULE ORAL 2 TIMES DAILY
Qty: 60 CAPSULE | Refills: 5 | Status: SHIPPED
Start: 2021-01-01 | End: 2021-01-01 | Stop reason: SDUPTHER

## 2021-01-01 RX ORDER — POLYETHYLENE GLYCOL 3350 17 G/17G
17 POWDER, FOR SOLUTION ORAL DAILY
Status: DISCONTINUED | OUTPATIENT
Start: 2021-01-01 | End: 2021-01-01 | Stop reason: HOSPADM

## 2021-01-01 RX ORDER — FUROSEMIDE 20 MG/1
20 TABLET ORAL EVERY OTHER DAY
Status: DISCONTINUED | OUTPATIENT
Start: 2021-01-01 | End: 2021-01-01 | Stop reason: HOSPADM

## 2021-01-01 RX ORDER — LIDOCAINE 4 G/G
1 PATCH TOPICAL DAILY
Qty: 2 BOX | Refills: 2 | Status: SHIPPED | OUTPATIENT
Start: 2021-01-01

## 2021-01-01 RX ORDER — RIVASTIGMINE 4.6 MG/24H
1 PATCH, EXTENDED RELEASE TRANSDERMAL DAILY
Qty: 30 PATCH | Refills: 3 | Status: SHIPPED
Start: 2021-01-01 | End: 2021-01-01

## 2021-01-01 RX ORDER — METHOCARBAMOL 500 MG/1
1500 TABLET, FILM COATED ORAL 4 TIMES DAILY
Status: DISCONTINUED | OUTPATIENT
Start: 2021-01-01 | End: 2021-01-01

## 2021-01-01 RX ORDER — FUROSEMIDE 20 MG/1
TABLET ORAL
Qty: 30 TABLET | Refills: 3 | Status: SHIPPED | OUTPATIENT
Start: 2021-01-01

## 2021-01-01 RX ORDER — OXYCODONE HYDROCHLORIDE 5 MG/1
5 TABLET ORAL EVERY 4 HOURS PRN
Status: DISCONTINUED | OUTPATIENT
Start: 2021-01-01 | End: 2021-01-01

## 2021-01-01 RX ORDER — RANOLAZINE 500 MG/1
500 TABLET, EXTENDED RELEASE ORAL DAILY
Status: DISCONTINUED | OUTPATIENT
Start: 2021-01-01 | End: 2021-01-01 | Stop reason: HOSPADM

## 2021-01-01 RX ORDER — AMLODIPINE BESYLATE 5 MG/1
5 TABLET ORAL DAILY
Qty: 90 TABLET | Refills: 3 | Status: SHIPPED | OUTPATIENT
Start: 2021-01-01

## 2021-01-01 RX ORDER — WARFARIN SODIUM 5 MG/1
5 TABLET ORAL
Status: ON HOLD | COMMUNITY
End: 2021-01-01 | Stop reason: HOSPADM

## 2021-01-01 RX ORDER — 0.9 % SODIUM CHLORIDE 0.9 %
1000 INTRAVENOUS SOLUTION INTRAVENOUS ONCE
Status: COMPLETED | OUTPATIENT
Start: 2021-01-01 | End: 2021-01-01

## 2021-01-01 RX ORDER — PANTOPRAZOLE SODIUM 40 MG/1
40 TABLET, DELAYED RELEASE ORAL
Status: DISCONTINUED | OUTPATIENT
Start: 2021-01-01 | End: 2021-01-01 | Stop reason: HOSPADM

## 2021-01-01 RX ORDER — ONDANSETRON 4 MG/1
4 TABLET, ORALLY DISINTEGRATING ORAL EVERY 8 HOURS PRN
Status: DISCONTINUED | OUTPATIENT
Start: 2021-01-01 | End: 2021-01-01 | Stop reason: HOSPADM

## 2021-01-01 RX ORDER — OXYCODONE HYDROCHLORIDE 5 MG/1
2.5 TABLET ORAL EVERY 4 HOURS PRN
Status: DISCONTINUED | OUTPATIENT
Start: 2021-01-01 | End: 2021-01-01 | Stop reason: HOSPADM

## 2021-01-01 RX ORDER — SODIUM CHLORIDE 0.9 % (FLUSH) 0.9 %
10 SYRINGE (ML) INJECTION PRN
Status: DISCONTINUED | OUTPATIENT
Start: 2021-01-01 | End: 2021-01-01 | Stop reason: HOSPADM

## 2021-01-01 RX ORDER — OXYCODONE HYDROCHLORIDE 5 MG/1
5 TABLET ORAL EVERY 6 HOURS PRN
Qty: 28 TABLET | Refills: 0 | Status: SHIPPED | OUTPATIENT
Start: 2021-01-01 | End: 2021-01-01

## 2021-01-01 RX ORDER — RIVASTIGMINE TARTRATE 1.5 MG/1
1.5 CAPSULE ORAL 2 TIMES DAILY
Qty: 60 CAPSULE | Refills: 1 | Status: SHIPPED
Start: 2021-01-01 | End: 2021-01-01

## 2021-01-01 RX ORDER — AMLODIPINE BESYLATE 5 MG/1
5 TABLET ORAL DAILY
Status: DISCONTINUED | OUTPATIENT
Start: 2021-01-01 | End: 2021-01-01 | Stop reason: HOSPADM

## 2021-01-01 RX ORDER — CYANOCOBALAMIN 1000 UG/ML
1000 INJECTION INTRAMUSCULAR; SUBCUTANEOUS ONCE
Status: COMPLETED | OUTPATIENT
Start: 2021-01-01 | End: 2021-01-01

## 2021-01-01 RX ORDER — MEMANTINE HYDROCHLORIDE 10 MG/1
10 TABLET ORAL 2 TIMES DAILY
Refills: 3 | Status: DISCONTINUED | OUTPATIENT
Start: 2021-01-01 | End: 2021-01-01 | Stop reason: HOSPADM

## 2021-01-01 RX ORDER — HEPARIN SODIUM 10000 [USP'U]/ML
5000 INJECTION, SOLUTION INTRAVENOUS; SUBCUTANEOUS EVERY 8 HOURS SCHEDULED
Status: DISCONTINUED | OUTPATIENT
Start: 2021-01-01 | End: 2021-01-01 | Stop reason: HOSPADM

## 2021-01-01 RX ORDER — MEMANTINE HYDROCHLORIDE 28 MG/1
28 CAPSULE, EXTENDED RELEASE ORAL DAILY
Qty: 30 CAPSULE | Refills: 0 | Status: SHIPPED
Start: 2021-01-01 | End: 2021-01-01

## 2021-01-01 RX ORDER — LEVETIRACETAM 5 MG/ML
500 INJECTION INTRAVASCULAR ONCE
Status: COMPLETED | OUTPATIENT
Start: 2021-01-01 | End: 2021-01-01

## 2021-01-01 RX ORDER — WARFARIN SODIUM 2.5 MG/1
2.5 TABLET ORAL
Status: ON HOLD | COMMUNITY
End: 2021-01-01 | Stop reason: HOSPADM

## 2021-01-01 RX ORDER — LABETALOL 100 MG/1
100 TABLET, FILM COATED ORAL EVERY 12 HOURS SCHEDULED
Status: DISCONTINUED | OUTPATIENT
Start: 2021-01-01 | End: 2021-01-01 | Stop reason: HOSPADM

## 2021-01-01 RX ORDER — ROSUVASTATIN CALCIUM 10 MG/1
10 TABLET, COATED ORAL DAILY
Qty: 90 TABLET | Refills: 3 | Status: SHIPPED | OUTPATIENT
Start: 2021-01-01

## 2021-01-01 RX ORDER — MEMANTINE HYDROCHLORIDE 28 MG/1
28 CAPSULE, EXTENDED RELEASE ORAL DAILY
Qty: 90 CAPSULE | Refills: 3 | Status: SHIPPED | OUTPATIENT
Start: 2021-01-01

## 2021-01-01 RX ORDER — LANOLIN ALCOHOL/MO/W.PET/CERES
1000 CREAM (GRAM) TOPICAL DAILY
COMMUNITY

## 2021-01-01 RX ORDER — OMEPRAZOLE 40 MG/1
CAPSULE, DELAYED RELEASE ORAL
Qty: 90 CAPSULE | Refills: 3 | Status: SHIPPED | OUTPATIENT
Start: 2021-01-01

## 2021-01-01 RX ORDER — ACETAMINOPHEN 160 MG/5ML
10 SUSPENSION, ORAL (FINAL DOSE FORM) ORAL EVERY 6 HOURS PRN
Status: CANCELLED | OUTPATIENT
Start: 2021-01-01

## 2021-01-01 RX ORDER — ROSUVASTATIN CALCIUM 10 MG/1
10 TABLET, COATED ORAL NIGHTLY
Status: DISCONTINUED | OUTPATIENT
Start: 2021-01-01 | End: 2021-01-01 | Stop reason: HOSPADM

## 2021-01-01 RX ORDER — OXYCODONE HYDROCHLORIDE 5 MG/1
2.5 TABLET ORAL EVERY 4 HOURS PRN
Status: DISCONTINUED | OUTPATIENT
Start: 2021-01-01 | End: 2021-01-01

## 2021-01-01 RX ORDER — ACETAMINOPHEN 325 MG/1
650 TABLET ORAL EVERY 6 HOURS
Status: DISCONTINUED | OUTPATIENT
Start: 2021-01-01 | End: 2021-01-01 | Stop reason: HOSPADM

## 2021-01-01 RX ORDER — METHOCARBAMOL 500 MG/1
500 TABLET, FILM COATED ORAL 4 TIMES DAILY
Status: DISCONTINUED | OUTPATIENT
Start: 2021-01-01 | End: 2021-01-01 | Stop reason: HOSPADM

## 2021-01-01 RX ORDER — SODIUM CHLORIDE 9 MG/ML
25 INJECTION, SOLUTION INTRAVENOUS PRN
Status: DISCONTINUED | OUTPATIENT
Start: 2021-01-01 | End: 2021-01-01 | Stop reason: HOSPADM

## 2021-01-01 RX ORDER — FINASTERIDE 5 MG/1
5 TABLET, FILM COATED ORAL
Status: DISCONTINUED | OUTPATIENT
Start: 2021-01-01 | End: 2021-01-01 | Stop reason: HOSPADM

## 2021-01-01 RX ORDER — LEVETIRACETAM 500 MG/1
500 TABLET ORAL 2 TIMES DAILY
Status: DISCONTINUED | OUTPATIENT
Start: 2021-01-01 | End: 2021-01-01

## 2021-01-01 RX ORDER — ONDANSETRON 2 MG/ML
4 INJECTION INTRAMUSCULAR; INTRAVENOUS EVERY 6 HOURS PRN
Status: DISCONTINUED | OUTPATIENT
Start: 2021-01-01 | End: 2021-01-01 | Stop reason: HOSPADM

## 2021-01-01 RX ORDER — LEVETIRACETAM 500 MG/1
500 TABLET ORAL 2 TIMES DAILY
Status: DISCONTINUED | OUTPATIENT
Start: 2021-01-01 | End: 2021-01-01 | Stop reason: HOSPADM

## 2021-01-01 RX ORDER — SODIUM CHLORIDE 9 MG/ML
INJECTION, SOLUTION INTRAVENOUS CONTINUOUS
Status: DISCONTINUED | OUTPATIENT
Start: 2021-01-01 | End: 2021-01-01 | Stop reason: HOSPADM

## 2021-01-01 RX ORDER — LISINOPRIL 5 MG/1
5 TABLET ORAL DAILY
Qty: 90 TABLET | Refills: 3 | Status: SHIPPED | OUTPATIENT
Start: 2021-01-01

## 2021-01-01 RX ORDER — SENNA AND DOCUSATE SODIUM 50; 8.6 MG/1; MG/1
1 TABLET, FILM COATED ORAL 2 TIMES DAILY
Status: DISCONTINUED | OUTPATIENT
Start: 2021-01-01 | End: 2021-01-01 | Stop reason: HOSPADM

## 2021-01-01 RX ORDER — TAMSULOSIN HYDROCHLORIDE 0.4 MG/1
0.4 CAPSULE ORAL DAILY
Status: DISCONTINUED | OUTPATIENT
Start: 2021-01-01 | End: 2021-01-01 | Stop reason: HOSPADM

## 2021-01-01 RX ORDER — SENNA AND DOCUSATE SODIUM 50; 8.6 MG/1; MG/1
1 TABLET, FILM COATED ORAL 2 TIMES DAILY
Qty: 30 TABLET | Refills: 2 | Status: SHIPPED | OUTPATIENT
Start: 2021-01-01

## 2021-01-01 RX ADMIN — POLYETHYLENE GLYCOL 3350 17 G: 17 POWDER, FOR SOLUTION ORAL at 08:05

## 2021-01-01 RX ADMIN — ACETAMINOPHEN 650 MG: 325 TABLET ORAL at 00:31

## 2021-01-01 RX ADMIN — METHOCARBAMOL TABLETS 500 MG: 500 TABLET, COATED ORAL at 09:21

## 2021-01-01 RX ADMIN — HEPARIN SODIUM 5000 UNITS: 10000 INJECTION INTRAVENOUS; SUBCUTANEOUS at 06:38

## 2021-01-01 RX ADMIN — LEVETIRACETAM 500 MG: 500 TABLET ORAL at 22:07

## 2021-01-01 RX ADMIN — POLYETHYLENE GLYCOL 3350 17 G: 17 POWDER, FOR SOLUTION ORAL at 09:23

## 2021-01-01 RX ADMIN — METHOCARBAMOL TABLETS 500 MG: 500 TABLET, COATED ORAL at 20:50

## 2021-01-01 RX ADMIN — SODIUM CHLORIDE: 9 INJECTION, SOLUTION INTRAVENOUS at 04:06

## 2021-01-01 RX ADMIN — PANTOPRAZOLE SODIUM 40 MG: 40 TABLET, DELAYED RELEASE ORAL at 06:41

## 2021-01-01 RX ADMIN — MEMANTINE HYDROCHLORIDE 10 MG: 10 TABLET, FILM COATED ORAL at 09:25

## 2021-01-01 RX ADMIN — LEVETIRACETAM 500 MG: 500 TABLET ORAL at 08:07

## 2021-01-01 RX ADMIN — TAMSULOSIN HYDROCHLORIDE 0.4 MG: 0.4 CAPSULE ORAL at 09:22

## 2021-01-01 RX ADMIN — LABETALOL HYDROCHLORIDE 100 MG: 100 TABLET, FILM COATED ORAL at 20:48

## 2021-01-01 RX ADMIN — TAMSULOSIN HYDROCHLORIDE 0.4 MG: 0.4 CAPSULE ORAL at 08:04

## 2021-01-01 RX ADMIN — SENNOSIDES AND DOCUSATE SODIUM 1 TABLET: 8.6; 5 TABLET ORAL at 22:08

## 2021-01-01 RX ADMIN — MEMANTINE HYDROCHLORIDE 10 MG: 10 TABLET, FILM COATED ORAL at 09:21

## 2021-01-01 RX ADMIN — METHOCARBAMOL TABLETS 500 MG: 500 TABLET, COATED ORAL at 12:10

## 2021-01-01 RX ADMIN — METHOCARBAMOL TABLETS 500 MG: 500 TABLET, COATED ORAL at 17:47

## 2021-01-01 RX ADMIN — Medication 10 ML: at 09:30

## 2021-01-01 RX ADMIN — IOPAMIDOL 90 ML: 755 INJECTION, SOLUTION INTRAVENOUS at 20:42

## 2021-01-01 RX ADMIN — ACETAMINOPHEN 650 MG: 325 TABLET ORAL at 12:10

## 2021-01-01 RX ADMIN — BISACODYL 5 MG: 5 TABLET, COATED ORAL at 08:04

## 2021-01-01 RX ADMIN — METHOCARBAMOL TABLETS 500 MG: 500 TABLET, COATED ORAL at 09:25

## 2021-01-01 RX ADMIN — CYANOCOBALAMIN 1000 MCG: 1000 INJECTION INTRAMUSCULAR; SUBCUTANEOUS at 10:40

## 2021-01-01 RX ADMIN — AMLODIPINE BESYLATE 5 MG: 5 TABLET ORAL at 08:04

## 2021-01-01 RX ADMIN — PHYTONADIONE 10 MG: 10 INJECTION, EMULSION INTRAMUSCULAR; INTRAVENOUS; SUBCUTANEOUS at 17:58

## 2021-01-01 RX ADMIN — LEVETIRACETAM 500 MG: 5 INJECTION INTRAVENOUS at 04:05

## 2021-01-01 RX ADMIN — RANOLAZINE 500 MG: 500 TABLET, FILM COATED, EXTENDED RELEASE ORAL at 08:04

## 2021-01-01 RX ADMIN — METHOCARBAMOL TABLETS 500 MG: 500 TABLET, COATED ORAL at 22:08

## 2021-01-01 RX ADMIN — RIVASTIGMINE TARTRATE 3 MG: 3 CAPSULE ORAL at 20:48

## 2021-01-01 RX ADMIN — RIVASTIGMINE TARTRATE 3 MG: 3 CAPSULE ORAL at 22:08

## 2021-01-01 RX ADMIN — PANTOPRAZOLE SODIUM 40 MG: 40 TABLET, DELAYED RELEASE ORAL at 05:39

## 2021-01-01 RX ADMIN — SODIUM CHLORIDE 1000 ML: 9 INJECTION, SOLUTION INTRAVENOUS at 17:31

## 2021-01-01 RX ADMIN — RIVASTIGMINE TARTRATE 3 MG: 3 CAPSULE ORAL at 08:04

## 2021-01-01 RX ADMIN — SENNOSIDES AND DOCUSATE SODIUM 1 TABLET: 8.6; 5 TABLET ORAL at 08:04

## 2021-01-01 RX ADMIN — LEVETIRACETAM 500 MG: 500 TABLET ORAL at 20:49

## 2021-01-01 RX ADMIN — AMLODIPINE BESYLATE 5 MG: 5 TABLET ORAL at 09:24

## 2021-01-01 RX ADMIN — METHOCARBAMOL TABLETS 500 MG: 500 TABLET, COATED ORAL at 08:08

## 2021-01-01 RX ADMIN — ROSUVASTATIN 10 MG: 10 TABLET, FILM COATED ORAL at 22:08

## 2021-01-01 RX ADMIN — TAMSULOSIN HYDROCHLORIDE 0.4 MG: 0.4 CAPSULE ORAL at 09:24

## 2021-01-01 RX ADMIN — METHOCARBAMOL TABLETS 500 MG: 500 TABLET, COATED ORAL at 13:42

## 2021-01-01 RX ADMIN — RANOLAZINE 500 MG: 500 TABLET, FILM COATED, EXTENDED RELEASE ORAL at 09:25

## 2021-01-01 RX ADMIN — LABETALOL HYDROCHLORIDE 100 MG: 100 TABLET, FILM COATED ORAL at 08:08

## 2021-01-01 RX ADMIN — Medication 10 ML: at 08:04

## 2021-01-01 RX ADMIN — LEVETIRACETAM 500 MG: 500 TABLET ORAL at 09:24

## 2021-01-01 RX ADMIN — POLYETHYLENE GLYCOL 3350 17 G: 17 POWDER, FOR SOLUTION ORAL at 09:29

## 2021-01-01 RX ADMIN — RIVASTIGMINE TARTRATE 3 MG: 3 CAPSULE ORAL at 09:21

## 2021-01-01 RX ADMIN — RIVASTIGMINE TARTRATE 3 MG: 3 CAPSULE ORAL at 09:24

## 2021-01-01 RX ADMIN — LEVETIRACETAM 500 MG: 500 TABLET ORAL at 22:02

## 2021-01-01 RX ADMIN — ACETAMINOPHEN 650 MG: 325 TABLET ORAL at 00:42

## 2021-01-01 RX ADMIN — MEMANTINE HYDROCHLORIDE 10 MG: 10 TABLET, FILM COATED ORAL at 20:48

## 2021-01-01 RX ADMIN — MEMANTINE HYDROCHLORIDE 10 MG: 10 TABLET, FILM COATED ORAL at 08:08

## 2021-01-01 RX ADMIN — HEPARIN SODIUM 5000 UNITS: 10000 INJECTION INTRAVENOUS; SUBCUTANEOUS at 14:56

## 2021-01-01 RX ADMIN — Medication 10 ML: at 22:08

## 2021-01-01 RX ADMIN — ROSUVASTATIN 10 MG: 10 TABLET, FILM COATED ORAL at 20:48

## 2021-01-01 RX ADMIN — FINASTERIDE 5 MG: 5 TABLET, FILM COATED ORAL at 08:08

## 2021-01-01 RX ADMIN — SENNOSIDES AND DOCUSATE SODIUM 1 TABLET: 8.6; 5 TABLET ORAL at 09:23

## 2021-01-01 RX ADMIN — FUROSEMIDE 20 MG: 20 TABLET ORAL at 09:24

## 2021-01-01 RX ADMIN — LABETALOL HYDROCHLORIDE 100 MG: 100 TABLET, FILM COATED ORAL at 09:24

## 2021-01-01 RX ADMIN — PHYTONADIONE 10 MG: 10 INJECTION, EMULSION INTRAMUSCULAR; INTRAVENOUS; SUBCUTANEOUS at 19:58

## 2021-01-01 RX ADMIN — SODIUM CHLORIDE 1000 ML: 9 INJECTION, SOLUTION INTRAVENOUS at 12:55

## 2021-01-01 RX ADMIN — TAMSULOSIN HYDROCHLORIDE 0.4 MG: 0.4 CAPSULE ORAL at 08:07

## 2021-01-01 RX ADMIN — ACETAMINOPHEN 650 MG: 325 TABLET ORAL at 08:07

## 2021-01-01 RX ADMIN — LABETALOL HYDROCHLORIDE 100 MG: 100 TABLET, FILM COATED ORAL at 09:21

## 2021-01-01 RX ADMIN — METHOCARBAMOL TABLETS 500 MG: 500 TABLET, COATED ORAL at 16:44

## 2021-01-01 RX ADMIN — HEPARIN SODIUM 5000 UNITS: 10000 INJECTION INTRAVENOUS; SUBCUTANEOUS at 09:25

## 2021-01-01 RX ADMIN — METHOCARBAMOL TABLETS 500 MG: 500 TABLET, COATED ORAL at 22:02

## 2021-01-01 RX ADMIN — LEVETIRACETAM 500 MG: 500 TABLET ORAL at 08:04

## 2021-01-01 RX ADMIN — Medication 10 ML: at 08:09

## 2021-01-01 RX ADMIN — MEMANTINE HYDROCHLORIDE 10 MG: 10 TABLET, FILM COATED ORAL at 22:02

## 2021-01-01 RX ADMIN — Medication 10 ML: at 20:54

## 2021-01-01 RX ADMIN — HEPARIN SODIUM 5000 UNITS: 10000 INJECTION INTRAVENOUS; SUBCUTANEOUS at 20:47

## 2021-01-01 RX ADMIN — SENNOSIDES AND DOCUSATE SODIUM 1 TABLET: 8.6; 5 TABLET ORAL at 20:48

## 2021-01-01 RX ADMIN — HEPARIN SODIUM 5000 UNITS: 10000 INJECTION INTRAVENOUS; SUBCUTANEOUS at 05:39

## 2021-01-01 RX ADMIN — LEVETIRACETAM 500 MG: 500 TABLET ORAL at 09:22

## 2021-01-01 RX ADMIN — LABETALOL HYDROCHLORIDE 100 MG: 100 TABLET, FILM COATED ORAL at 08:04

## 2021-01-01 RX ADMIN — RANOLAZINE 500 MG: 500 TABLET, FILM COATED, EXTENDED RELEASE ORAL at 08:09

## 2021-01-01 RX ADMIN — ACETAMINOPHEN 650 MG: 325 TABLET ORAL at 22:02

## 2021-01-01 RX ADMIN — PROTHROMBIN, COAGULATION FACTOR VII HUMAN, COAGULATION FACTOR IX HUMAN, COAGULATION FACTOR X HUMAN, PROTEIN C, PROTEIN S HUMAN, AND WATER 2000 UNITS: KIT at 17:57

## 2021-01-01 RX ADMIN — LABETALOL HYDROCHLORIDE 100 MG: 100 TABLET, FILM COATED ORAL at 22:07

## 2021-01-01 RX ADMIN — LEVETIRACETAM 500 MG: 5 INJECTION INTRAVENOUS at 18:16

## 2021-01-01 RX ADMIN — RANOLAZINE 500 MG: 500 TABLET, FILM COATED, EXTENDED RELEASE ORAL at 09:21

## 2021-01-01 RX ADMIN — LABETALOL HYDROCHLORIDE 100 MG: 100 TABLET, FILM COATED ORAL at 22:02

## 2021-01-01 RX ADMIN — AMLODIPINE BESYLATE 5 MG: 5 TABLET ORAL at 09:21

## 2021-01-01 RX ADMIN — MEMANTINE HYDROCHLORIDE 10 MG: 10 TABLET, FILM COATED ORAL at 22:08

## 2021-01-01 RX ADMIN — ACETAMINOPHEN 650 MG: 325 TABLET ORAL at 06:41

## 2021-01-01 RX ADMIN — PANTOPRAZOLE SODIUM 40 MG: 40 TABLET, DELAYED RELEASE ORAL at 06:11

## 2021-01-01 RX ADMIN — ACETAMINOPHEN 650 MG: 325 TABLET ORAL at 21:22

## 2021-01-01 RX ADMIN — FUROSEMIDE 20 MG: 20 TABLET ORAL at 09:21

## 2021-01-01 RX ADMIN — RIVASTIGMINE TARTRATE 3 MG: 3 CAPSULE ORAL at 08:08

## 2021-01-01 RX ADMIN — ACETAMINOPHEN 650 MG: 325 TABLET ORAL at 06:11

## 2021-01-01 RX ADMIN — AMLODIPINE BESYLATE 5 MG: 5 TABLET ORAL at 08:07

## 2021-01-01 RX ADMIN — SENNOSIDES AND DOCUSATE SODIUM 1 TABLET: 8.6; 5 TABLET ORAL at 09:22

## 2021-01-01 RX ADMIN — RIVASTIGMINE TARTRATE 3 MG: 3 CAPSULE ORAL at 22:02

## 2021-01-01 RX ADMIN — HEPARIN SODIUM 5000 UNITS: 10000 INJECTION INTRAVENOUS; SUBCUTANEOUS at 13:09

## 2021-01-01 RX ADMIN — ACETAMINOPHEN 650 MG: 325 TABLET ORAL at 18:27

## 2021-01-01 RX ADMIN — MEMANTINE HYDROCHLORIDE 10 MG: 10 TABLET, FILM COATED ORAL at 08:04

## 2021-01-01 RX ADMIN — FINASTERIDE 5 MG: 5 TABLET, FILM COATED ORAL at 08:04

## 2021-01-01 RX ADMIN — TETANUS TOXOID, REDUCED DIPHTHERIA TOXOID AND ACELLULAR PERTUSSIS VACCINE, ADSORBED 0.5 ML: 5; 2.5; 8; 8; 2.5 SUSPENSION INTRAMUSCULAR at 22:42

## 2021-01-01 RX ADMIN — PANTOPRAZOLE SODIUM 40 MG: 40 TABLET, DELAYED RELEASE ORAL at 06:39

## 2021-01-01 RX ADMIN — HEPARIN SODIUM 5000 UNITS: 10000 INJECTION INTRAVENOUS; SUBCUTANEOUS at 21:59

## 2021-01-01 RX ADMIN — ACETAMINOPHEN 650 MG: 325 TABLET ORAL at 13:09

## 2021-01-01 RX ADMIN — Medication 10 ML: at 09:23

## 2021-01-01 RX ADMIN — METHOCARBAMOL TABLETS 500 MG: 500 TABLET, COATED ORAL at 13:12

## 2021-01-01 RX ADMIN — ROSUVASTATIN 10 MG: 10 TABLET, FILM COATED ORAL at 22:02

## 2021-01-01 RX ADMIN — ACETAMINOPHEN 650 MG: 325 TABLET ORAL at 13:42

## 2021-01-01 RX ADMIN — SENNOSIDES AND DOCUSATE SODIUM 1 TABLET: 8.6; 5 TABLET ORAL at 21:23

## 2021-01-01 RX ADMIN — SENNOSIDES AND DOCUSATE SODIUM 1 TABLET: 8.6; 5 TABLET ORAL at 22:02

## 2021-01-01 RX ADMIN — ACETAMINOPHEN 650 MG: 325 TABLET ORAL at 09:23

## 2021-01-01 RX ADMIN — METHOCARBAMOL TABLETS 1500 MG: 500 TABLET, COATED ORAL at 08:04

## 2021-01-01 RX ADMIN — METHOCARBAMOL TABLETS 1500 MG: 500 TABLET, COATED ORAL at 21:23

## 2021-01-01 SDOH — ECONOMIC STABILITY: FOOD INSECURITY: WITHIN THE PAST 12 MONTHS, THE FOOD YOU BOUGHT JUST DIDN'T LAST AND YOU DIDN'T HAVE MONEY TO GET MORE.: NEVER TRUE

## 2021-01-01 SDOH — ECONOMIC STABILITY: FOOD INSECURITY: WITHIN THE PAST 12 MONTHS, YOU WORRIED THAT YOUR FOOD WOULD RUN OUT BEFORE YOU GOT MONEY TO BUY MORE.: NEVER TRUE

## 2021-01-01 ASSESSMENT — ENCOUNTER SYMPTOMS
RESPIRATORY NEGATIVE: 1
RESPIRATORY NEGATIVE: 1
EYES NEGATIVE: 1
RESPIRATORY NEGATIVE: 1
EYES NEGATIVE: 1
EYES NEGATIVE: 1

## 2021-01-01 ASSESSMENT — PAIN DESCRIPTION - DESCRIPTORS
DESCRIPTORS: ACHING;DISCOMFORT;SORE
DESCRIPTORS: ACHING
DESCRIPTORS: ACHING;DISCOMFORT

## 2021-01-01 ASSESSMENT — PAIN DESCRIPTION - PAIN TYPE
TYPE: ACUTE PAIN

## 2021-01-01 ASSESSMENT — PAIN SCALES - GENERAL
PAINLEVEL_OUTOF10: 0
PAINLEVEL_OUTOF10: 6
PAINLEVEL_OUTOF10: 0
PAINLEVEL_OUTOF10: 4
PAINLEVEL_OUTOF10: 0
PAINLEVEL_OUTOF10: 3
PAINLEVEL_OUTOF10: 0
PAINLEVEL_OUTOF10: 0
PAINLEVEL_OUTOF10: 4
PAINLEVEL_OUTOF10: 2
PAINLEVEL_OUTOF10: 0
PAINLEVEL_OUTOF10: 3
PAINLEVEL_OUTOF10: 0
PAINLEVEL_OUTOF10: 2
PAINLEVEL_OUTOF10: 0
PAINLEVEL_OUTOF10: 0

## 2021-01-01 ASSESSMENT — PAIN DESCRIPTION - ONSET
ONSET: GRADUAL
ONSET: GRADUAL

## 2021-01-01 ASSESSMENT — PATIENT HEALTH QUESTIONNAIRE - PHQ9: DEPRESSION UNABLE TO ASSESS: FUNCTIONAL CAPACITY MOTIVATION LIMITS ACCURACY

## 2021-01-01 ASSESSMENT — PAIN DESCRIPTION - ORIENTATION
ORIENTATION: RIGHT
ORIENTATION: RIGHT
ORIENTATION: LEFT

## 2021-01-01 ASSESSMENT — PAIN DESCRIPTION - LOCATION
LOCATION: ARM
LOCATION: RIB CAGE
LOCATION: RIB CAGE

## 2021-01-01 ASSESSMENT — PAIN DESCRIPTION - PROGRESSION
CLINICAL_PROGRESSION: NOT CHANGED

## 2021-01-01 ASSESSMENT — PAIN DESCRIPTION - FREQUENCY
FREQUENCY: INTERMITTENT
FREQUENCY: CONTINUOUS

## 2021-01-01 ASSESSMENT — SOCIAL DETERMINANTS OF HEALTH (SDOH): HOW HARD IS IT FOR YOU TO PAY FOR THE VERY BASICS LIKE FOOD, HOUSING, MEDICAL CARE, AND HEATING?: NOT HARD AT ALL

## 2021-01-04 NOTE — TELEPHONE ENCOUNTER
Left message for pt / wife that DR has no new orders re: last week's lab results (except for INR which was handled in an anticoag encounter). Requested they call back if they would like more detail.

## 2021-01-04 NOTE — PROGRESS NOTES
Left message for wife / pt to have pt hold Warfarin (Coumadin) x 1 day, then resume current dosing schedule and repeat INR in 1 month.

## 2021-01-12 NOTE — PROGRESS NOTES
Discussed Vit B12 lab order with Dr Claudeen January. Pt is on B12 injections, lab not needed. Lab was ordered in error & will be cancelled.

## 2021-02-03 NOTE — PROGRESS NOTES
PT/INR drawn & sent to lab. Per wife, current Coumadin dosing is 2.5 mg on Thursdays, 5 mg all remaining days of the week. B12 injection given as documented on MAR. Pt tolerated injection well.

## 2021-02-05 NOTE — PROGRESS NOTES
Per Dr Andre Ventura pt needs to come in on Tuesday to get his pt inr done again called pt wife and set up appointment to have pt inr don on Tuesday AM 2/9/21

## 2021-02-09 NOTE — PROGRESS NOTES
Agree with this  action and we will continue warfarin 5 mg a day until and possibly after Monday 2/15/2021 depending on when Eliquis arrives,  Will need INR on Monday 2/15/21.

## 2021-02-09 NOTE — PROGRESS NOTES
INR drawn & sent to lab. Per wife, Coumadin had been on hold x 4 days, restarted yesterday with 5 mg. Routine dosing schedule is 2.5 mg on Thursdays, and 5 mg all remaining days of the week.
Patient-parent interaction appropriate

## 2021-02-09 NOTE — TELEPHONE ENCOUNTER
Spoke with mrs garner and he will be switching to Eliquis in 3 days , until then we will be continuing warfarin

## 2021-02-09 NOTE — PROGRESS NOTES
Per wife, Coumadin had been on hold for 4 days. Restarted on 2/8/21 with 5 mg at lunch time. Prior to the hold, Coumadin dose was 2.5 mg on Thursdays, and 5 mg all remaining days of the week.

## 2021-02-09 NOTE — TELEPHONE ENCOUNTER
Pt was here for INR. Wife states pt has been on Coumadin for several years because of blood clots in his leg. States pt had a filter put into his leg. Wife had wanted to give pt meds only 3 times a day instead of 4, so a week ago she moved Coumadin to lunchtime, and his HS meds to dinnertime. Wife inquired as to whether there is any med that pt can take in place of the Coumadin -- she specifically mentioned Eliquis. Coumadin has been on hold for 4 days, restarted 5 mg yesterday at lunchtime. Routine dosing schedule is 2.5 mg on Thursdays, and 5 mg all remaining days of the week. INR drawn today. Wife will wait for new instructions before giving today's dose. Is there any possibility of changing Coumadin to something else?

## 2021-02-09 NOTE — PROGRESS NOTES
Spoke to wife, will order Eliquis 5 mg bid and warfarin 5 mg a day until eliquis comes in and will start later in week

## 2021-02-15 NOTE — PROGRESS NOTES
Wife notified of DR's orders. States she found out from [employer] / pharmacy that the actual cost of the med for 3 months is $1500 -- which will put pt in the donut hole sooner. (Actual price paid for the med was closer to $140). Based on being in the donut hole sooner, not sure she wants to start the Eliquis. If not going to start the Eliquis, will DR have different insrtuctions re: Coumadin dosing? And when to restart it? Please advise.

## 2021-02-15 NOTE — PROGRESS NOTES
I spoke to Mrs. [id number] regarding specific instructions on the warfarin , AND we will not be staring the Eliquis at this time.

## 2021-02-15 NOTE — PROGRESS NOTES
Coumadin was on hold x 4 days, restarted on 2/8/21. Dose after that was 5 mg daily, but wife states she only gave 2.5 mg on Thursday, 2/11 (pt's regular Thursday dose). Routinely, pt had been on 2.5 mg on Thursdays, and 5 mg all remaining days of the week. Per wife, she did rcve pt's mail order Eliquis , but has not yet started it. Please advise.

## 2021-02-15 NOTE — PROGRESS NOTES
Orders written to hold warfarin until Thursday then restart regular schedule again. Recheck INR in 2 weeks, Will not be startiing Eliquis at this time.

## 2021-02-15 NOTE — PROGRESS NOTES
PT/INR drawn & sent to lab. Per wife, Coumadin has been 2.5 mg on Thursday, 5 mg all remaining days of the week.

## 2021-02-17 NOTE — PROGRESS NOTES
Elisabeth Childers  Return Patient    Chief Complaint   Patient presents with    Toe Pain     saw pcp Dr. Jacob Fischer on 12/3/2020       Subjective: This Muriel Pedraza comes to clinic for foot and nail care. Pt currently has complaint of thickened, painful, elongated nails that he/she cannot manage by themselves. Pt. Relates pain to nails with shoe gear. Pt's primary care physician is Hamida Merritt MD.  Past Medical History:   Diagnosis Date    Acute myocardial infarction, unspecified site 1995    Myocardial Infarction    [facility]'s disease ([facility] [address]     Anemia associated with acute blood loss 4/11/2013    BPH associated with nocturia 4/9/2013    CAD (coronary artery disease), native coronary artery 4/9/2013    DVT, lower extremity, distal ([facility] [address] 4/9/2013    HTN, goal below 130/80 4/9/2013    Hx of blood clots     Hyperlipidemia LDL goal < 70 4/9/2013    Pulmonary emboli ([facility] [address] 4/9/2013    Retroperitoneal hematoma 4/11/2013    Shingles 2008       Allergies   Allergen Reactions    Heparin Other (See Comments)     \"Reaction\" to heparin per wife. Retroperitoneal hematoma.  Tape Simmie Masood Tape] Other (See Comments)     Skin tears.  Tape Simmie Masood Tape] Rash     Current Outpatient Medications on File Prior to Visit   Medication Sig Dispense Refill    apixaban (ELIQUIS) 5 MG TABS tablet Take 1 tablet by mouth 2 times daily 180 tablet 3    memantine ER (NAMENDA XR) 28 MG CP24 extended release capsule Take 1 capsule by mouth daily 30 capsule 0    warfarin (COUMADIN) 5 MG tablet Take 1 tablet by mouth daily As directed, based on INR results.  90 tablet 3    lisinopril (PRINIVIL;ZESTRIL) 5 MG tablet Take 1 tablet by mouth daily 90 tablet 3    amLODIPine (NORVASC) 5 MG tablet Take 1 tablet by mouth daily 90 tablet 3    rosuvastatin (CRESTOR) 10 MG tablet Take 1 tablet by mouth daily 90 tablet 3    labetalol (NORMODYNE) 100 MG tablet Take 1 tablet by mouth 2 times daily 180 tablet 3    omeprazole (PRILOSEC) 40 MG delayed release capsule Take 1 capsule by mouth every morning (before breakfast) 90 capsule 3    ranolazine (RANEXA) 500 MG extended release tablet Take 1 tablet by mouth daily 90 tablet 3    amoxicillin (AMOXIL) 500 MG capsule Take by mouth Pre-dental work      finasteride (PROSCAR) 5 MG tablet Take 5 mg by mouth three times a week On Monday, Wednesday, and Friday      tamsulosin (FLOMAX) 0.4 MG capsule Take 0.4 mg by mouth daily      cyanocobalamin 1000 MCG/ML injection Inject 1,000 mcg into the muscle every 30 days      magnesium hydroxide (MILK OF MAGNESIA) 400 MG/5ML suspension Take by mouth daily as needed for Constipation      acetaminophen (TYLENOL) 500 MG tablet Take 1,000 mg by mouth as needed for Pain \"Seldom takes\".  Cholecalciferol (VITAMIN D3) 2000 UNITS CAPS Take 2,000 Units by mouth daily.  nitroGLYCERIN (NITROSTAT) 0.4 MG SL tablet Place 0.4 mg under the tongue every 5 minutes as needed. No current facility-administered medications on file prior to visit. Review of Systems   Constitutional: Negative. HENT: Negative. Eyes: Negative. Respiratory: Negative. Endocrine: Negative. Genitourinary: Negative. Objective:  General: AAO x 3 in NAD.     Derm  Toenail Description  Sites of Onychomycosis Involvement (Check affected area)  [x] [x] [x] [x] [x] [x] [x] [x] [x] [x]  5 4 3 2 1 1 2 3 4 5                          Right                                        Left    Thickness  [x] [x] [x] [x] [x] [x] [x] [x] [x] [x]  5 4 3 2 1 1 2 3 4 5                         Right                                        Left    Dystrophic Changes   [x] [x] [x] [x] [x] [x] [x] [x] [x] [x]  5 4 3 2 1 1 2 3 4 5                         Right                                        Left    Color  [x] [x] [x] [x] [x] [x] [x] [x] [x] [x]  5 4 3 2 1 1 2 3 4 5                          Right                                        Left    Incurvation/Ingrowin [x] [x] [x] [x] [x] [x] [x] [x] [x] [x]  5 4 3 2 1 1 2 3 4 5                         Right                                        Left    Inflammation/Pain   [x] [x] [x] [x] [x] [x] [x] [x] [x] [x]  5 4 3  2 1 1 2 3 4 5                         Right                                        Left      Nails that are described above are all elongated thickened pitting mycotic yellowish incurvated causing pain with both shoe gear. Palpation nails greater then 3 mm thick painful       Dermatologic Exam:hair loss noted  lower extremity    Skin lesion/ulceration   Skin   Callus   Musculoskeletal:     1st MPJ ROM normal, Bilateral.  Muscle strength 5/5, Bilateral.  Pain present upon palpation of toenails 1-5   Bilateral., Bilateral.  Ankle ROM normal,Bilateral.    Dorsally contracted digits, Bilateral.     Vascular:  Pulses   bilateral DP     PT absnet    Neurological:  Sensation present to light touch to level of digits, Bilateral.    Foot Exam    General  General Appearance: appears stated age and healthy   Orientation: alert and oriented to person, place, and time   Assistance: walker use       Right Foot/Ankle     Inspection and Palpation  Ecchymosis: none  Tenderness: none   Swelling: dorsum   Arch: pes planus  Skin Exam: skin changes and abnormal color; Neurovascular  Dorsalis pedis: absent  Posterior tibial: absent  Saphenous nerve sensation: normal  Tibial nerve sensation: normal  Superficial peroneal nerve sensation: normal  Deep peroneal nerve sensation: normal  Sural nerve sensation: normal  Achilles reflex: 2+  Babinski reflex: 2+    Muscle Strength  Ankle dorsiflexion: 3  Ankle plantar flexion: 3  Ankle inversion: 3  Great toe extension: 3  Great toe flexion: 3    Tests  Anterior drawer: negative   Varus tilt: negative   Syndesmosis squeeze: negative       Left Foot/Ankle      Inspection and Palpation  Ecchymosis: none  Tenderness: none   Skin Exam: skin changes and abnormal color;      Neurovascular

## 2021-03-02 NOTE — PROGRESS NOTES
Wife confirmed current Coumadin dosing -- 2.5 mg on Thursdays, 5 mg all remaining days of the week. PT/INR drawn & sent to lab. Had to hold pressure to draw site for a few minutes for bleeding to stop. Monthly B12 injection not given for this reason. Wife notified, states pt also had some bleeding recently after shaving.

## 2021-03-02 NOTE — PROGRESS NOTES
Gave specific instructions on warfarin to Mrs Michelle Jiménez and she will hold drug until Saturday and restart with modifications of 5 mg a day and 2.5 mg on Tuesday and Thursday.   The worsening cognition is from stopping Aricept and the bad dreams were telling Mr Melina Harvey  to \"Kill his wife\"  We both thought it best to not add any new drug that   Might make dreams recur at this time

## 2021-03-02 NOTE — PROGRESS NOTES
This AM, wife stated pt's routine Coumadin dose is 2.5 mg on Thursdays, 5 mg all remaining days of the week. Did not give monthly B12 injection today d/t length of time it took to stop bleeding at the lab draw site. Wife notified that INR is very high, and not to give any Coumadin until  gives further instructions. States understanding. Checked with wife re: any changes in pt meals. States since before the holidays pt is not eating a lot. Said he's told her that his \"mouth is funny\". She'll be checking with the dentist.    Since the beginning of this year, they changed time of day that pt takes the Coumadin -- now taking Coumadin with lunch -- around 1 pm.  Had been giving routine meds 4 x per day, changed at that time to TID, so they changed Coumadin to lunchtime. Pt did rcve a shipment of Eliquis, but wife verified that it has NOT been started. Wife changed her mind re: switching to Eliquis d/t cost & the donut hole. Only other change wife can think of is that the Aricept was discontinued as pt was having terrible nightmares. Changing the time of med had not helped. Wife states pt has really declined cognitively since Aricept was stopped.

## 2021-03-30 NOTE — TELEPHONE ENCOUNTER
While here for lab draw this morning, wife mentioned that pt has \"lost so much memory\" since the Aricept was discontinued d/t nightmares. Pt once threatened wife during a nightmare. States pt still talks all night. Remains on Namenda. Any suggestions re: memory?

## 2021-04-13 NOTE — PROGRESS NOTES
Re: current Coumadin dosing -- wife states pt had rcvd 5 mg aponte x 2 days after the last INR, then resumed 2.5 mg daily

## 2021-04-13 NOTE — TELEPHONE ENCOUNTER
Per Dr Margarett Schilder, Dr Rosetta Omer can address leg / foot edema as well. Wife notified, states understanding & will discuss SOB & leg / foot edema with Dr Rosetta Omer. Discussed lab results with wife.

## 2021-04-13 NOTE — TELEPHONE ENCOUNTER
Wife stated that she had spoken with  about the Exelon PA denial, and med was changed from the patch to oral capsules.

## 2021-04-13 NOTE — PROGRESS NOTES
Current Coumadin dosing, per wife, is now 2.5 mg daily. States pt had taken 5 mg daily x 2 days as instructed after last INR, then dose was 2.5 mg daily.

## 2021-04-13 NOTE — TELEPHONE ENCOUNTER
No direct action at this toime from labs, Dr Jem Patel will direct treatment on Thursday for his SOB.

## 2021-04-13 NOTE — PROGRESS NOTES
Wife notified to have pt take 5 mg of Coumadin daily x 4 days, then resume 2.5 mg daily & recheck INR in  2 weeks. States understanding. As FYI, wife states she started pt on oral B12 today.

## 2021-04-13 NOTE — TELEPHONE ENCOUNTER
Pt arrived for PT/INR lab draw this morning. While here, wife stated that pt is having a problem with swelling of the feet for a couple of days. States pt has had lower leg edema for some time, but foot edema is new. Also has had SOB with exertion \"for quite a while\" -- not a new problem. Per wife, pt will be seeing Dr Robe Garcia, cardiology, this Thursday. Discussed above with Dr Snehal Oviedo. Labs to be drawn -- BNP,  BMP, & CBC. Bloodwork drawn & sent to lab.

## 2021-04-20 NOTE — TELEPHONE ENCOUNTER
Per wife, rcvd the generic Exelon 1.5 mg capsules. Dose on bottle indicates BID, wife calling to confirm. Start med at BID dosing? Time of day to take med?

## 2021-04-20 NOTE — TELEPHONE ENCOUNTER
Wife notified of Dr Slava Rolon' Exelon dosing directions, states understanding. She will call with any questions or concerns once the med is started.

## 2021-04-27 NOTE — PROGRESS NOTES
Called pt wife told her the orders for the coumadin take 5 mg today and to take 2.5 mg daily and to repeat in two weeks appt scheduled for may 11

## 2021-04-28 NOTE — PROGRESS NOTES
Harrison Sahu  Return Patient    Chief Complaint   Patient presents with    Toe Pain     saw pcp Dr. Shyam Pearl on 4/27/2021       Subjective: This Waykatelyn Ruiz comes to clinic for foot and nail care. Pt currently has complaint of thickened, painful, elongated nails that he/she cannot manage by themselves. Pt. Relates pain to nails with shoe gear. Pt's primary care physician is Gene Lucas MD.  Past Medical History:   Diagnosis Date    Acute myocardial infarction, unspecified site 1995    Myocardial Infarction    [facility]'s disease ([facility] [address]     Anemia associated with acute blood loss 4/11/2013    BPH associated with nocturia 4/9/2013    CAD (coronary artery disease), native coronary artery 4/9/2013    DVT, lower extremity, distal ([facility] [address] 4/9/2013    HTN, goal below 130/80 4/9/2013    Hx of blood clots     Hyperlipidemia LDL goal < 70 4/9/2013    Pulmonary emboli ([facility] [address] 4/9/2013    Retroperitoneal hematoma 4/11/2013    Shingles 2008       Allergies   Allergen Reactions    Heparin Other (See Comments)     \"Reaction\" to heparin per wife. Retroperitoneal hematoma.  Tape Verl Scrape Tape] Other (See Comments)     Skin tears.  Tape Verl Scrape Tape] Rash     Current Outpatient Medications on File Prior to Visit   Medication Sig Dispense Refill    rivastigmine (EXELON) 1.5 MG capsule Take 1 capsule by mouth 2 times daily 60 capsule 1    vitamin B-12 (CYANOCOBALAMIN) 1000 MCG tablet Take 1,000 mcg by mouth daily      apixaban (ELIQUIS) 5 MG TABS tablet Take 1 tablet by mouth 2 times daily 180 tablet 3    memantine ER (NAMENDA XR) 28 MG CP24 extended release capsule Take 1 capsule by mouth daily 30 capsule 0    warfarin (COUMADIN) 5 MG tablet Take 1 tablet by mouth daily As directed, based on INR results.  90 tablet 3    lisinopril (PRINIVIL;ZESTRIL) 5 MG tablet Take 1 tablet by mouth daily 90 tablet 3    amLODIPine (NORVASC) 5 MG tablet Take 1 tablet by mouth daily 90 tablet 3  rosuvastatin (CRESTOR) 10 MG tablet Take 1 tablet by mouth daily 90 tablet 3    labetalol (NORMODYNE) 100 MG tablet Take 1 tablet by mouth 2 times daily 180 tablet 3    omeprazole (PRILOSEC) 40 MG delayed release capsule Take 1 capsule by mouth every morning (before breakfast) 90 capsule 3    ranolazine (RANEXA) 500 MG extended release tablet Take 1 tablet by mouth daily 90 tablet 3    amoxicillin (AMOXIL) 500 MG capsule Take by mouth Pre-dental work      finasteride (PROSCAR) 5 MG tablet Take 5 mg by mouth three times a week On Monday, Wednesday, and Friday      tamsulosin (FLOMAX) 0.4 MG capsule Take 0.4 mg by mouth daily      cyanocobalamin 1000 MCG/ML injection Inject 1,000 mcg into the muscle every 30 days      magnesium hydroxide (MILK OF MAGNESIA) 400 MG/5ML suspension Take by mouth daily as needed for Constipation      acetaminophen (TYLENOL) 500 MG tablet Take 1,000 mg by mouth as needed for Pain \"Seldom takes\".  Cholecalciferol (VITAMIN D3) 2000 UNITS CAPS Take 2,000 Units by mouth daily.  nitroGLYCERIN (NITROSTAT) 0.4 MG SL tablet Place 0.4 mg under the tongue every 5 minutes as needed. No current facility-administered medications on file prior to visit. Review of Systems   Constitutional: Negative. HENT: Negative. Eyes: Negative. Respiratory: Negative. Endocrine: Negative. Genitourinary: Negative. Objective:  General: AAO x 3 in NAD.     Derm  Toenail Description  Sites of Onychomycosis Involvement (Check affected area)  [x] [x] [x] [x] [x] [x] [x] [x] [x] [x]  5 4 3 2 1 1 2 3 4 5                          Right                                        Left    Thickness  [x] [x] [x] [x] [x] [x] [x] [x] [x] [x]  5 4 3 2 1 1 2 3 4 5                         Right                                        Left    Dystrophic Changes   [x] [x] [x] [x] [x] [x] [x] [x] [x] [x]  5 4 3 2 1 1 2 3 4 5                         Right Left    Color  [x] [x] [x] [x] [x] [x] [x] [x] [x] [x]  5 4 3 2 1 1 2 3 4 5                          Right                                        Left    Incurvation/Ingrowin   [x] [x] [x] [x] [x] [x] [x] [x] [x] [x]  5 4 3 2 1 1 2 3 4 5                         Right                                        Left    Inflammation/Pain   [x] [x] [x] [x] [x] [x] [x] [x] [x] [x]  5 4 3  2 1 1 2 3 4 5                         Right                                        Left      Nails that are described above are all elongated thickened pitting mycotic yellowish incurvated causing pain with both shoe gear. Palpation nails greater then 3 mm thick painful       Dermatologic Exam:hair loss noted  lower extremity    Skin lesion/ulceration   Skin   Callus   Musculoskeletal:     1st MPJ ROM normal, Bilateral.  Muscle strength 5/5, Bilateral.  Pain present upon palpation of toenails 1-5   Bilateral., Bilateral.  Ankle ROM normal,Bilateral.    Dorsally contracted digits, Bilateral.     Vascular:  Pulses   bilateral DP     PT absnet    Neurological:  Sensation present to light touch to level of digits, Bilateral.    Foot Exam    General  General Appearance: appears stated age and healthy   Orientation: alert and oriented to person, place, and time   Assistance: walker use       Right Foot/Ankle     Inspection and Palpation  Ecchymosis: none  Tenderness: none   Swelling: dorsum   Arch: pes planus  Skin Exam: skin changes and abnormal color;      Neurovascular  Dorsalis pedis: absent  Posterior tibial: absent  Saphenous nerve sensation: normal  Tibial nerve sensation: normal  Superficial peroneal nerve sensation: normal  Deep peroneal nerve sensation: normal  Sural nerve sensation: normal  Achilles reflex: 2+  Babinski reflex: 2+    Muscle Strength  Ankle dorsiflexion: 3  Ankle plantar flexion: 3  Ankle inversion: 3  Great toe extension: 3  Great toe flexion: 3    Tests  Anterior drawer: negative   Varus tilt: negative   Syndesmosis squeeze: negative       Left Foot/Ankle      Inspection and Palpation  Ecchymosis: none  Tenderness: none   Skin Exam: skin changes and abnormal color; Neurovascular  Dorsalis pedis: absent  Posterior tibial: absent  Saphenous nerve sensation: normal  Tibial nerve sensation: normal  Superficial peroneal nerve sensation: normal  Deep peroneal nerve sensation: normal  Sural nerve sensation: normal  Achilles reflex: 2+  Babinski reflex: 2+             Right Ankle Exam     Muscle Strength   Dorsiflexion:  3/5  Plantar flexion:  3/5          Q7   []Yes    []No                Q8   [x]Yes    []No                     Q9   []Yes    []No  Assessment:  79 y.o. male with:   1. Tinea unguium    2. Pain in left toe(s)    3. Pain in toe of right foot    4. Peripheral vascular disease, unspecified ([address]    5. Difficulty walking    6. Acute deep vein thrombosis (DVT) of distal vein of both lower extremities (HCC)     No orders of the defined types were placed in this encounter. Plan:   Pt was evaluated and examined. Patient was given personalized discharge instructions. Nails 1-10 were debrided in length and thickness sharply with a nail nipper and  without incident. Pt will follow up in 9 weeks or sooner if any problems arise. Diagnosis was discussed with the pt and all of their questions were answered in detail. Proper foot hygiene and care was discussed with the pt. Patient to check feet daily and contact the office with any questions/problems/concerns. Other comorbidity noted and will be managed by PCP. Pain waiver discussed with patient and confirmed.    4/28/2021      Electronically signed by Sergio Yeager DPM on 4/28/2021 at 2:39 PM  4/28/2021

## 2021-05-11 NOTE — TELEPHONE ENCOUNTER
Pt here this morning for INR. At that time, wife reported that pt started Exelon 1.5 mg capsules BID approx 10 days ago. Wife feels pt's memory is declining - has been gradual since he's been off the Aricept because of nightmares, but thinks it might also be declining since Exelon was started -- unsure, though. Per wife, pt asked her if they sold their home (happened 2 years ago), and told her that may have been a bad decision. Additionally, as FYJAMAAL, states pt has an appt to see Dr Ciro Jones tomorrow to adjust his defibrillator settings because his pulse is very low.

## 2021-05-11 NOTE — PROGRESS NOTES
Per Dr Lucy Salomon' order, wife notified to give pt 5 mg of Coumadin today, tomorrow, and Thursday. Then resume 2.5 mg daily and recheck INR on 5/25/21. States understanding.

## 2021-05-11 NOTE — TELEPHONE ENCOUNTER
Wife notified to hold Exelon for now. Requested she call back on Friday with update. States understanding.

## 2021-05-14 NOTE — TELEPHONE ENCOUNTER
Checked with wife re: status since Exelon has been on hold. States she really can't  it, thinks pt may be the same. Was up & dressed the other day at 4 am -- never did that before. Moves his hands a lot, both while awake and while sleeping. Please call wife to discuss & advise.

## 2021-05-14 NOTE — TELEPHONE ENCOUNTER
Spoke with wife and will go back to Exelon 1.5 mg bid x 7 days then to 3.0 mg bid with food . She will call next week.

## 2021-05-25 NOTE — PROGRESS NOTES
Chief Complaint   Patient presents with    Altered Mental Status     Acute visit. Wife states pt's mental status has declined since pt had to stop Aricept d/t nightmares. Started Exelon capsule, but she has not noticed any improvement.  Leg Swelling     Bilateral ankle & lower leg edema -- since before May 12th.  Fatigue     Generalized weakness. Balance is poor, per wife. Discussed the possibility of home PT -- wife wants to discuss with family first.     Other     [address] respirs? ?    Hypotension     92/56 sitting.  Labs Only     Due for INR.  Hallucinations     Wife states pt hallucinates now more than he ever did before.

## 2021-05-26 NOTE — PROGRESS NOTES
Re: Coumadin dosing -- pt has been on 2.5 mg daily. Per Dr Leanne Gu' instructions -- wife notified to give pt 5 mg today, tomorrow, and this Friday, and give 2.5 mg on Saturday and Sunday (5/29 & 5/30). Then, starting Monday 5/31, dosing will be 5 mg on Mondays, Wednesdays, and Fridays,  and 2.5 mg all remaining days of the week (Tues, Thurs, Sat, & Sun). Recheck INR on 6/8/21. Wife repeated dosing instructions, stated understanding.

## 2021-06-08 NOTE — PROGRESS NOTES
Per wife -- current Coumadin is 5 mg every Monday, Wednesday, & Friday. And 2.5 mg all remaining days of the week.

## 2021-06-08 NOTE — PROGRESS NOTES
PT/INR & B12 level drawn & sent to lab. Per wife, current Coumadin is 5 mg every Monday, Wednesday, & Friday, and 2.5 mg all remaining days of the week.

## 2021-06-08 NOTE — PROGRESS NOTES
Wife notified to continue current Coumadin dosing, and recheck INR in 2 weeks. States understanding.

## 2021-06-09 NOTE — TELEPHONE ENCOUNTER
Pt had previously switched from IM to oral B12 supplementation. B12 level was drawn yesterday to assess effectiveness -- result in chart. Continue with oral B12?

## 2021-06-22 NOTE — PROGRESS NOTES
PT/ INR drawn & sent to lab. Current Coumadin dosing verified with wife  --  5 mg on Mondays, Wednesdays, & Fridays. 2.5 mg all remaining days of the week.

## 2021-06-28 NOTE — TELEPHONE ENCOUNTER
Wife was sorry to have missed DR's calls last week re: pt's confusion, use of Exelon (see 6/22/21 phone encounter for details). She will be home today if Dr would be so kind as to call her back.

## 2021-06-28 NOTE — TELEPHONE ENCOUNTER
SPPOKE WITH MRS BIAAFF AND  IS NOT WORSE AND AT LEAST CALM.  WE WILL CONTINUE EXELON 3 MG BID FOR NOW

## 2021-07-07 NOTE — PROGRESS NOTES
Betha Jewel  Return Patient    Chief Complaint   Patient presents with    Toe Pain     saw pcp Dr. Urbano Lowe 6/22/21       Subjective: This Maeve Barrett comes to clinic for foot and nail care. Pt currently has complaint of thickened, painful, elongated nails that he/she cannot manage by themselves. Pt. Relates pain to nails with shoe gear. Pt's primary care physician is Trinidad Szymanski MD.  Patient today relates that the swelling in the right foot and ankle have become more patient does relate that sometimes he is short of breath today he is not. Patient denies fever chills night sweats. Patient denies chest pain. Past Medical History:   Diagnosis Date    Acute myocardial infarction, unspecified site 1995    Myocardial Infarction    Alzheimer's disease ([facility] [address]     Anemia associated with acute blood loss 4/11/2013    BPH associated with nocturia 4/9/2013    CAD (coronary artery disease), native coronary artery 4/9/2013    DVT, lower extremity, distal ([facility] [address] 4/9/2013    HTN, goal below 130/80 4/9/2013    Hx of blood clots     Hyperlipidemia LDL goal < 70 4/9/2013    Pulmonary emboli (HCC) 4/9/2013    Retroperitoneal hematoma 4/11/2013    Shingles 2008       Allergies   Allergen Reactions    Heparin Other (See Comments)     \"Reaction\" to heparin per wife. Retroperitoneal hematoma.  Tape Asuncion  Tape] Other (See Comments)     Skin tears.     Tape Asuncion  Tape] Rash     Current Outpatient Medications on File Prior to Visit   Medication Sig Dispense Refill    rivastigmine (EXELON) 3 MG capsule Take 1 capsule by mouth 2 times daily 180 capsule 3    amLODIPine (NORVASC) 5 MG tablet TAKE 1 TABLET BY MOUTH  DAILY 90 tablet 3    ranolazine (RANEXA) 500 MG extended release tablet TAKE 1 TABLET BY MOUTH  DAILY 90 tablet 3    omeprazole (PRILOSEC) 40 MG delayed release capsule TAKE 1 CAPSULE BY MOUTH IN  THE MORNING 90 capsule 3    vitamin B-12 (CYANOCOBALAMIN) 1000 MCG tablet Take 1,000 mcg by mouth daily      memantine ER (NAMENDA XR) 28 MG CP24 extended release capsule Take 1 capsule by mouth daily 30 capsule 0    warfarin (COUMADIN) 5 MG tablet Take 1 tablet by mouth daily As directed, based on INR results. 90 tablet 3    lisinopril (PRINIVIL;ZESTRIL) 5 MG tablet Take 1 tablet by mouth daily 90 tablet 3    rosuvastatin (CRESTOR) 10 MG tablet Take 1 tablet by mouth daily 90 tablet 3    labetalol (NORMODYNE) 100 MG tablet Take 1 tablet by mouth 2 times daily 180 tablet 3    amoxicillin (AMOXIL) 500 MG capsule Take by mouth Pre-dental work      finasteride (PROSCAR) 5 MG tablet Take 5 mg by mouth three times a week On Monday, Wednesday, and Friday      tamsulosin (FLOMAX) 0.4 MG capsule Take 0.4 mg by mouth daily      magnesium hydroxide (MILK OF MAGNESIA) 400 MG/5ML suspension Take by mouth daily as needed for Constipation      acetaminophen (TYLENOL) 500 MG tablet Take 500 mg by mouth as needed for Pain \"Seldom takes\".  Cholecalciferol (VITAMIN D3) 2000 UNITS CAPS Take 2,000 Units by mouth daily.  nitroGLYCERIN (NITROSTAT) 0.4 MG SL tablet Place 0.4 mg under the tongue every 5 minutes as needed. No current facility-administered medications on file prior to visit. Review of Systems   Constitutional: Negative. HENT: Negative. Eyes: Negative. Respiratory: Negative. Endocrine: Negative. Genitourinary: Negative. Objective:  General: AAO x 3 in NAD.     Derm  Toenail Description  Sites of Onychomycosis Involvement (Check affected area)  [x] [x] [x] [x] [x] [x] [x] [x] [x] [x]  5 4 3 2 1 1 2 3 4 5                          Right                                        Left    Thickness  [x] [x] [x] [x] [x] [x] [x] [x] [x] [x]  5 4 3 2 1 1 2 3 4 5                         Right                                        Left    Dystrophic Changes   [x] [x] [x] [x] [x] [x] [x] [x] [x] [x]  5 4 3 2 1 1 2 3 4 5                         Right Left    Color  [x] [x] [x] [x] [x] [x] [x] [x] [x] [x]  5 4 3 2 1 1 2 3 4 5                          Right                                        Left    Incurvation/Ingrowin   [x] [x] [x] [x] [x] [x] [x] [x] [x] [x]  5 4 3 2 1 1 2 3 4 5                         Right                                        Left    Inflammation/Pain   [x] [x] [x] [x] [x] [x] [x] [x] [x] [x]  5 4 3  2 1 1 2 3 4 5                         Right                                        Left      Nails that are described above are all elongated thickened pitting mycotic yellowish incurvated causing pain with both shoe gear. Palpation nails greater then 3 mm thick painful       Dermatologic Exam:hair loss noted  lower extremity    Skin lesion/ulceration   Skin   Callus   Musculoskeletal:     1st MPJ ROM normal, Bilateral.  Muscle strength 5/5, Bilateral.  Pain present upon palpation of toenails 1-5   Bilateral., Bilateral.  Ankle ROM normal,Bilateral.    Dorsally contracted digits, Bilateral.     Vascular: Massive swelling to the right foot ankle and calf. Negative tenderness posterior aspect of the calf negative Homans' sign. Pulses   bilateral DP     PT absnet    Neurological:  Sensation present to light touch to level of digits, Bilateral.    Foot Exam    General  General Appearance: appears stated age and healthy   Orientation: alert and oriented to person, place, and time   Assistance: walker use       Right Foot/Ankle     Inspection and Palpation  Ecchymosis: none  Tenderness: none   Swelling: dorsum   Arch: pes planus  Skin Exam: skin changes and abnormal color;      Neurovascular  Dorsalis pedis: absent  Posterior tibial: absent  Saphenous nerve sensation: normal  Tibial nerve sensation: normal  Superficial peroneal nerve sensation: normal  Deep peroneal nerve sensation: normal  Sural nerve sensation: normal  Achilles reflex: 2+  Babinski reflex: 2+    Muscle Strength  Ankle dorsiflexion: 3  Ankle plantar flexion: 3  Ankle inversion: 3  Great toe extension: 3  Great toe flexion: 3    Tests  Anterior drawer: negative   Varus tilt: negative   Syndesmosis squeeze: negative       Left Foot/Ankle      Inspection and Palpation  Ecchymosis: none  Tenderness: none   Skin Exam: skin changes and abnormal color; Neurovascular  Dorsalis pedis: absent  Posterior tibial: absent  Saphenous nerve sensation: normal  Tibial nerve sensation: normal  Superficial peroneal nerve sensation: normal  Deep peroneal nerve sensation: normal  Sural nerve sensation: normal  Achilles reflex: 2+  Babinski reflex: 2+             Right Ankle Exam     Muscle Strength   Dorsiflexion:  3/5  Plantar flexion:  3/5        Pulse ox reading was 96.  Q7   []Yes    []No                Q8   [x]Yes    []No                     Q9   []Yes    []No  Assessment:  79 y.o. male with:   1. Acute deep vein thrombosis (DVT) of other specified vein of both lower extremities (HCC)    2. Localized edema    3. Pain in both lower legs    4. Tinea unguium    5. Pain in left toe(s)    6. Pain in toe of right foot    7. Peripheral vascular disease, unspecified ([address]    8. Difficulty walking    9. Acute deep vein thrombosis (DVT) of distal vein of both lower extremities ([address]       Orders Placed This Encounter   Procedures    US DUP LOWER EXTREMITIES BILATERAL VENOUS     Standing Status:   Future     Standing Expiration Date:   7/7/2022         Plan: Today we are getting an ultrasound done this is scheduled for Saturday at [address] [facility] in [facility]. We will reevaluate and possibly send him to vascular patient is on a blood thinner at this time. Coumadin patient does have a filter in the left leg for blood clot. Pt was evaluated and examined. Patient was given personalized discharge instructions. Nails 1-10 were debrided in length and thickness sharply with a nail nipper and  without incident. Pt will follow up in 9 weeks or sooner if any problems arise.  Diagnosis was discussed with the pt and all of their questions were answered in detail. Proper foot hygiene and care was discussed with the pt. Patient to check feet daily and contact the office with any questions/problems/concerns. Other comorbidity noted and will be managed by PCP. Pain waiver discussed with patient and confirmed.    7/7/2021      Electronically signed by Warden Merrick DPM on 7/7/2021 at 2:53 PM  7/7/2021

## 2021-07-13 NOTE — TELEPHONE ENCOUNTER
Pt wife spoke to you yesterday about pt leg swelling. States that you ordered lasix for him. She states that prior to this that you would not put pt on lasix due to his low blood pressure. Pt wife will be out from 1 to 3 today.  Please call her later today

## 2021-07-14 NOTE — TELEPHONE ENCOUNTER
Spoke with wife and now his BP is 120/x and told her to try Lasix 10 mg instead of 20 mg 3 times a week and drink a lot of coffee, always check BP before dosing and do not give if <110

## 2021-07-20 NOTE — PROGRESS NOTES
Wife confirmed current Warfarin dosing -- 5 mg on Mondays, Wednesdays, and Fridays. 2.5 mg all remaining days of the week. Instructed, per Dr Joya Weber' order, to have pt continue same dosing and recheck INR in 4 weeks. States understanding.

## 2021-07-20 NOTE — PROGRESS NOTES
PT/INR drawn & sent to lab. Dosing confirmed with wife -- Warfarin 5 mg on Mondays, Wednesdays, and Fridays. 2.5 mg all remaining days of the week.
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
## 2021-07-25 NOTE — ED NOTES
[facility] ASSESSMENT NOTE                                                                                                Department of Emergency Medicine                                                      First Provider Note  21  12:34 PM EDT  NAME: Sanya Larson  : 1940  MRN: [id number]    Chief Complaint: Fall (was using his walker and he tripped and hit is head off the dresser, left arm skin tear, right rib pain. + thinners. )      History of Present Illness:   Sanya Larson is a 79 y.o. male who presents to the ED for evaluation of a fall. Focused Physical Exam:  VS:    ED Triage Vitals   BP Temp Temp src Pulse Resp SpO2 Height Weight   21 1227 21 1215 -- 21 1215 21 1215 21 1215 21 1226 21 1226   115/72 97.7 °F (36.5 °C)  85 16 92 % 5' 8\" (1.727 m) 165 lb (74.8 kg)        General: Alert and in no apparent distress. Patient has right eye periorbital ecchymosis, midthoracic back pain, right anterior rib pain and a large skin tear to the left upper arm at the shoulder. Medical History:  has a past medical history of Acute myocardial infarction, unspecified site, Alzheimer's disease ([address] Anemia associated with acute blood loss, BPH associated with nocturia, CAD (coronary artery disease), native coronary artery, DVT, lower extremity, distal ([address] HTN, goal below 130/80, Hx of blood clots, Hyperlipidemia LDL goal < 70, Pulmonary emboli (HCC), Retroperitoneal hematoma, and Shingles. Surgical History:  has a past surgical history that includes hernia repair; back surgery; Coronary artery bypass graft; Cardiac surgery; hernia repair (); Endoscopy, colon, diagnostic (); Colonoscopy (); ECHO Compl W Dop Color Flow (2013); ECHO Compl W Dop Color Flow (4/15/2013); ECHO Compl W Dop Color Flow (2013); Upper gastrointestinal endoscopy (13);  Pacemaker insertion; Cardiac defibrillator placement; and pacemaker placement. Social History:  reports that he quit smoking about 41 years ago. His smoking use included cigarettes. He started smoking about 61 years ago. He has a 20.00 pack-year smoking history. He has never used smokeless tobacco. He reports that he does not drink alcohol and does not use drugs. Family History: family history is not on file.     Allergies: Heparin, Tape Emerson Latin tape], and Tape [adhesive tape]     Initial Plan of Care:  Initiate Treatment-Testing, Proceed toTreatment Area When Bed Available for ED Attending/MLP to Continue Care    -------------------------------------------------[address] ASSESSMENT NOTE--------------------------------------------------------  Electronically signed by AGUSTIN Matute CNP   DD: 7/25/21       AGUSTIN Matute CNP  07/25/21 [id number]

## 2021-07-25 NOTE — H&P
TRAUMA HISTORY & PHYSICAL  Surgical Resident/Advance Practice Nurse  7/25/2021  3:49 PM    PRIMARY SURVEY  CHIEF COMPLAINT:  Trauma consult. 80 y/o male mechanical fall from standing height in his bedroom. Hit his head, no LOC. Patient takes coumadin for history of DVT's. INR on presentation 3.0. Found to have small L frontal SAH and small L subdural hemorrhage. R ribs 5-9 fracture. GCS 15, no neuro deficits. AIRWAY:   Airway Normal  EMS ETT Absent  Noisy respirations Absent  Retractions: Absent  Vomiting/bleeding: Absent      BREATHING:    Midaxillary breath sound left:  Normal  Midaxillary breath sound right:  Normal    Cough sound intensity:  good   FiO2: Room air  SMI 1250 mL.       CIRCULATION:   Femerol pulse intensity: Strong  Palpebral conjunctiva: Pink       Vitals:    07/25/21 1227   BP: 115/72   Pulse:    Resp:    Temp:    SpO2:        Vitals:    07/25/21 1215 07/25/21 1226 07/25/21 1227   BP:   115/72   Pulse: 85     Resp: 16     Temp: 97.7 °F (36.5 °C)     SpO2: 92%     Weight:  165 lb (74.8 kg)    Height:  5' 8\" (1.727 m)         FAST EXAM: not performed    Central Nervous System    GCS Initial 15 minutes   Eye  Motor  Verbal 4 - Opens eyes on own  6 - Follows simple motor commands  5 - Alert and oriented 4 - Opens eyes on own  6 - Follows simple motor commands  5 - Alert and oriented     Neuromuscular blockade: No  Pupil size:  Left 3 mm    Right 3 mm  Pupil reaction: Yes    Wiggles fingers: Left Yes Right Yes  Wiggles toes: Left Yes   Right Yes    Hand grasp:   Left  Present      Right  Present  Plantar flexion: Left  Present      Right   Present    Loss of consciousness:  No  History Obtained From:  Patient & family      Pre-exisiting Medical History:  yes    Conditions: alzheimers, DVT, PE, HTN    Medications: warfarin, llisinopril, labetalol, mementine, rosuvastatin, LASIX, Exelon, amlodipine, ranolazine    Allergies: NKDA    Social History:   Tobacco use:  none  Alcohol use: none  Illicit drug use:  no history of illicit drug use    Past Surgical History:  Hernia repair, CABG, pacemaker insertion    Anticoagulant use: Yes warfarin  Antiplatelet use:    no    NSAID use in last 72 hours: unknown  Taken PCN in past:  yes  Last food/drink: This AM  Last tetanus: unknown    Family History:   Not pertinent to presenting problem. Complaints:   Head:  Mild  Neck:   None  Chest:   Moderate  Back:   None  Abdomen:   None  Extremities:   Mild  Comments: 3x3cm skin tear to L deltoid area    Review of systems:  All negative unless otherwise noted. SECONDARY SURVEY  Head/scalp: Atraumatic    Face: Atraumatic    Eyes/ears/nose: Atraumatic    Pharynx/mouth: Atraumatic    Neck: Atraumatic     Cervical spine tenderness:   Cervical collar not indicated  Pain:  none  ROM:  full    Chest wall:  Atraumatic    Heart:  Regular rate & rhythm    Abdomen: Atraumatic. Soft ND  Tenderness:  none    Pelvis: Atraumatic  Tenderness: none    Thoracolumbar spine: Atraumatic  Tenderness: mild thoracic spine tenderness        Extremities:   Sensory normal  Motor normal    Distal Pulses  Left arm normal  Right arm normal  Left leg normal  Right leg normal    Capillary refill  Left arm normal  Right arm normal  Left leg normal  Right leg normal    Procedures in ED:  None    In the event of Emergency Blood Transfusion:  Due to the critical condition of this patient, I request the immediate release of blood products for emergency transfusion secondary to shock. I understand the increased risks incurred by the lack of complete transfusion testing.       Radiology: CT Head , CT Face , CT Cervical spine  and CT Chest , XR pelvis, XR L shoulder    Consultations:  Neurosurgery    Admission/Diagnosis: R rib 5-9 fracture, SAH, SDH    Plan of Treatment:    - warfarin reversal  - admit to ICU  - abdominal CT  - pain control  - neurosurgery recs    Plan discussed with Dr. Modesto Sultana at 7/25/2021 on 3:49 PM    Electronically signed by Ludy Ivey,  on 7/25/2021 at 3:49 PM

## 2021-07-25 NOTE — ED PROVIDER NOTES
Department of Emergency Medicine   ED  Provider Note  Admit Date/RoomTime: 7/25/2021  2:11 PM  ED Room: 15/15          History of Present Illness:  7/25/21, Time: 5:00 PM EDT  Chief Complaint   Patient presents with   Levonia Halo     was using his walker and he tripped and hit is head off the dresser, left arm skin tear, right rib pain. + thinners. Pepe Welch is a 79 y.o. male presenting to the ED for fall. Patient mechanical fall at home. He did strike his head, there is no loss of conscious, he is on anticoagulation. He struck his head off a dresser. He does complain of a skin tear on his left arm along with right rib pain. Worse when he breathes, does not rating her. Sharp in nature. Denies nausea, vomiting, neck pain, paresthesias, cough, sputum, chest pain, abdomen, lethargy, or any other symptoms or complaints. Review of Systems:   Pertinent positives and negatives are stated within HPI, all other systems reviewed and are negative.        --------------------------------------------- PAST HISTORY ---------------------------------------------  Past Medical History:  has a past medical history of Acute myocardial infarction, unspecified site, Alzheimer's disease ([address] Anemia associated with acute blood loss, BPH associated with nocturia, CAD (coronary artery disease), native coronary artery, DVT, lower extremity, distal ([address] HTN, goal below 130/80, Hx of blood clots, Hyperlipidemia LDL goal < 70, Pulmonary emboli ([address] Retroperitoneal hematoma, and Shingles. Past Surgical History:  has a past surgical history that includes hernia repair; back surgery; Coronary artery bypass graft; Cardiac surgery; hernia repair (1980); Endoscopy, colon, diagnostic (2006); Colonoscopy (2010); ECHO Compl W Dop Color Flow (4/8/2013); ECHO Compl W Dop Color Flow (4/15/2013); ECHO Compl W Dop Color Flow (12/6/2013); Upper gastrointestinal endoscopy (12/6/13);  Pacemaker insertion; Cardiac defibrillator placement; and pacemaker placement. Social History:  reports that he quit smoking about 41 years ago. His smoking use included cigarettes. He started smoking about 61 years ago. He has a 20.00 pack-year smoking history. He has never used smokeless tobacco. He reports that he does not drink alcohol and does not use drugs. Family History: family history is not on file. . Unless otherwise noted, family history is non contributory    The patients home medications have been reviewed. Allergies: Heparin, Tape [adhesive tape], and Tape [adhesive tape]        ---------------------------------------------------PHYSICAL EXAM--------------------------------------    Constitutional/General: Alert and oriented x3  Head: Normocephalic and atraumatic  Eyes: PERRL, EOMI, sclera non icteric  Mouth: Oropharynx clear, handling secretions, no trismus, no asymmetry of the posterior oropharynx or uvular edema  Neck: Supple, full ROM, no stridor, no meningeal signs  Respiratory: Lungs clear to auscultation bilaterally, no wheezes, rales, or rhonchi. Not in respiratory distress  Cardiovascular:  Regular rate. Regular rhythm. 2+ distal pulses. Equal extremity pulses. Chest: Tenderness palpation to the anterior chest wall, right side, no crepitus  GI:  Abdomen Soft, Non tender, Non distended. No rebound, guarding, or rigidity. No pulsatile masses. Musculoskeletal: Moves all extremities x 4. Warm and well perfused, no clubbing, cyanosis, or edema. Capillary refill <3 seconds  Integument: skin warm and dry. No rashes. Skin tear noted to the left shoulder area  Neurologic: GCS 15, no focal deficits, symmetric strength 5/5 in the upper and lower extremities bilaterally  Psychiatric: Normal Affect          -------------------------------------------------- RESULTS -------------------------------------------------  I have personally reviewed all laboratory and imaging results for this patient. Results are listed below. LABS: (Lab results interpreted by me)  Results for orders placed or performed during the hospital encounter of 07/25/21   Protime-INR   Result Value Ref Range    Protime 32.7 (H) 9.3 - 12.4 sec    INR 3.0    CBC Auto Differential   Result Value Ref Range    WBC 7.7 4.5 - 11.5 E9/L    RBC 3.93 3.80 - 5.80 E12/L    Hemoglobin 12.2 (L) 12.5 - 16.5 g/dL    Hematocrit 38.4 37.0 - 54.0 %    MCV 97.7 80.0 - 99.9 fL    MCH 31.0 26.0 - 35.0 pg    MCHC 31.8 (L) 32.0 - 34.5 %    RDW 14.7 11.5 - 15.0 fL    Platelets 080 810 - 567 E9/L    MPV 9.5 7.0 - 12.0 fL    Neutrophils % 79.7 43.0 - 80.0 %    Immature Granulocytes % 1.3 0.0 - 5.0 %    Lymphocytes % 8.2 (L) 20.0 - 42.0 %    Monocytes % 7.9 2.0 - 12.0 %    Eosinophils % 2.5 0.0 - 6.0 %    Basophils % 0.4 0.0 - 2.0 %    Neutrophils Absolute 6.15 1.80 - 7.30 E9/L    Immature Granulocytes # 0.10 E9/L    Lymphocytes Absolute 0.63 (L) 1.50 - 4.00 E9/L    Monocytes Absolute 0.61 0.10 - 0.95 E9/L    Eosinophils Absolute 0.19 0.05 - 0.50 E9/L    Basophils Absolute 0.03 0.00 - 0.20 M2/V   Basic Metabolic Panel   Result Value Ref Range    Sodium 136 132 - 146 mmol/L    Potassium 4.1 3.5 - 5.0 mmol/L    Chloride 102 98 - 107 mmol/L    CO2 22 22 - 29 mmol/L    Anion Gap 12 7 - 16 mmol/L    Glucose 166 (H) 74 - 99 mg/dL    BUN 16 6 - 23 mg/dL    CREATININE 1.0 0.7 - 1.2 mg/dL    GFR Non-African American >60 >=60 mL/min/1.73    GFR African American >60     Calcium 8.9 8.6 - 10.2 mg/dL   ,       RADIOLOGY:  Interpreted by Radiologist unless otherwise specified  CT HEAD WO CONTRAST   Final Result   1. Small focus of subarachnoid hemorrhage in the left frontal region. 2.  No acute osseous abnormality seen in the cervical spine. CT FACIAL BONES WO CONTRAST   Final Result   1. No acute facial fracture. 2. Subcutaneous contusion and hematoma in the right nasal bridge. 3. Subcutaneous contusion in the inferior medial right forehead/right brow.    4. Suspected subdural or MAKING----------------------  Medications   levETIRAcetam (KEPPRA) 500 mg/100 mL IVPB (has no administration in time range)   prothrombin complex concentrate (human) (KCENTRA) infusion 2,000 Units (has no administration in time range)   0.9 % sodium chloride infusion (has no administration in time range)   phytonadione (ADULT) (VITAMIN K) 10 mg in dextrose 5 % 100 mL IVPB (has no administration in time range)   lidocaine 4 % external patch 1 patch (has no administration in time range)           The cardiac monitor revealed Sinus with a heart rate in the 80s as interpreted by me. The cardiac monitor was ordered secondary to the patient's fall and to monitor the patient for dysrhythmia. CPT [id number]         Medical Decision Making:    Imaging reviewed. Reeval, patient's resting, pain controlled. Trauma to be consulted. Counseling: The emergency provider has spoken with the patient and discussed todays results, in addition to providing specific details for the plan of care and counseling regarding the diagnosis and prognosis. Questions are answered at this time and they are agreeable with the plan.       --------------------------------- IMPRESSION AND DISPOSITION ---------------------------------    IMPRESSION  1. Fall, initial encounter    2. Closed head injury, initial encounter    3. Closed fracture of multiple ribs, unspecified laterality, initial encounter    4. SAH ([facility]arachnoid hemorrhage) ([facility]        DISPOSITION  Disposition: Admit to Trauma  Patient condition is stable        NOTE: This report was transcribed using voice recognition software.  Every effort was made to ensure accuracy; however, inadvertent computerized transcription errors may be present       Reji Mac MD  07/25/21 8720

## 2021-07-25 NOTE — LETTER
[place] Department Medicaid  CERTIFICATION OF NECESSITY  FOR NON-EMERGENCY TRANSPORTATION   BY GROUND AMBULANCE      Individual Information   1. Name: Ciera Jain 2. [place] Medicaid Billing Number:   3. Address: [address]     Transportation Provider Information   4. Provider Name:    5. [place] Medicaid Provider Number:  National Provider Identifier (NPI):      Certification  7. Criteria:  During transport, this individual requires:  [x] Medical treatment or continuous     supervision by an EMT. [] The administration or regulation of oxygen by another person. [] Supervised protective restraint. 8. Period Beginning Date: 7/28/2021   9. Length  [x] Not more than 10 day(s)  [] One Year     Additional Information Relevant to Certification   10. Comments or Explanations, If Necessary or Appropriate Fall, Subdiral hemaoma, Fx right ribs, Facial contusion, Dementia, Alxheimer's       Certifying Practitioner Information   11. Name of Practitioner: Dr. Krystin Valente MD   12. [place] Medicaid Provider Number, If Applicable:  [address] Provider Identifier (NPI):      Signature Information   14. Date of Signature: 7/28/2021   15. Name of Person Signing:   [facility]   16. Signature and Professional Designation:   Electronically signed by FAIZA Ramirez on 7/28/2021 at 3:41 PM       [facility] [address]  Rev. 7/2015               [address] [place] [address] Encounter Date/Time: 7/25/2021 [facility]    Hospital Account: [de-identified]    MRN: [id number]    Patient: Ciera Jain    Contact Serial #: [id number]      ENCOUNTER          Patient Class: I Private Enc? No Unit RM BD: SEYZ 4S PICU 4506/4506-B   Hospital Service: Intermediate   Encounter DX: Fall from standing, init*   ADM Provider: Krystin Valente MD   Procedure:     ATT Provider: Krystin Valente MD   REF Provider:        Admission DX: Fall from standing, initial encounter and DX codes: Rodney Lainez    PATIENT                 Name: Katina Zepeda : 1940 (81 yrs)   Address: [address] [place] [address] [address] Sex: Male   [facility] [address]         Marital Status:    Employer: RETIRED         Faith: Judaism   Primary Care Provider: Lino Ellsworth MD         Primary Phone: [phone]   EMERGENCY CONTACT   Contact Name Legal Guardian? Relationship to Patient Home Phone Work Phone   1. Christiana Minor  2. Dunia Arevalo      Spouse  Child [phone]              GUARANTOR            Guarantor: Katina Zepeda     : 1940   Address: [facility] [address] Sex: Male     [facility] [address]     Relation to Patient: Self       Home Phone: [phone]   Guarantor ID: [id number]       Work Phone:     Guarantor Employer: RETIRED         Status: RETIRED      COVERAGE        PRIMARY INSURANCE   Payor: MEDICARE Plan: MEDICARE PART A AND B   Payor Address: [place] [address],  [place] [address]       Group Number:   Insurance Type: INDEMNITY   Subscriber Name: Ravi Blair : 1940   Subscriber ID: [id number] Pat. Rel. to Sub: Self   SECONDARY INSURANCE   Payor: UNITED HE[employer]CARE Plan: [address]*   Payor Address:  [facility] [id number], [place], [place] [address]          Group Number: Plan [id number] Insurance Type: INDEMNITY   Subscriber Name: Ravi Blair : 1940   Subscriber ID: [id number] [place].  Rel. to Sub: SELF

## 2021-07-26 PROBLEM — R79.1 SUPRATHERAPEUTIC INR: Status: ACTIVE | Noted: 2021-01-01

## 2021-07-26 PROBLEM — S22.41XA FRACTURE OF MULTIPLE RIBS OF RIGHT SIDE: Status: ACTIVE | Noted: 2021-01-01

## 2021-07-26 PROBLEM — I60.9 SAH (SUBARACHNOID HEMORRHAGE) (HCC): Status: ACTIVE | Noted: 2021-01-01

## 2021-07-26 PROBLEM — S00.83XA FACIAL CONTUSION: Status: ACTIVE | Noted: 2021-01-01

## 2021-07-26 PROBLEM — S06.5XAA SDH (SUBDURAL HEMATOMA): Status: ACTIVE | Noted: 2021-01-01

## 2021-07-26 NOTE — CONSULTS
Palliative Care Department  [phone]  Palliative Care Initial Consult  Provider Sandoval Marino, [address]  [id number]  Hospital Day: 2  Date of Initial Consult: 7/26/2021  Referring Provider: Nissa Isaac MD  Palliative Medicine was consulted for assistance with: geriatric trauma    Brief Summary of Hospital Course:   Quoc Carrillo is a 79 y.o. with a medical history of CAD with MI s/p CABG, Alzheimer's dementia, HTN, HLD, PE/DVT on chronicwarfarin, BPH, pacer, hypertrophic obstructive cardiomyopathy who was admitted on 7/25/2021 from home with a CHIEF COMPLAINT of fall from standing. ASSESSMENT/PLAN:       Pertinent Hospital Problems      Left frontal SAH   Left SDH      Palliative Care Encounter / Counseling Regarding Goals of Care  Please see detailed goals of care discussion as below   At this time, Quoc Carrillo, Does Not have capacity for medical decision-making. Capacity is time limited and situation/question specific   During encounter wife was surrogate medical decision-maker   Outcome of goals of care meeting: family requests DNR-CCA   Code status DNR-CCA   Advanced Directives: HCPOA and living will in [facility]   Surrogate/Legal NOK:  o Wife/HCPOA Jimi [phone]  o Daughter Keyana Madrigal [phone]  o Daughter Zhen Jimenez [phone]  o [address] [facility] [address] [place] [phone]    Spiritual assessment: no spiritual distress identified  Bereavement and grief: to be determined  Referrals to: none today    SUBJECTIVE:     Details of Conversation:  Evaluated patient at bedside, daughter Orquidea Felling present. Orquidea Felling unsure about code status, requests that her mother be called. Spoke with patient's wife Giulia Gaona on the phone, went over code status options. Giulia Gaona requests DNR-CCA code status, would not want patient to have surgery unless it was life threatening. Emotional support provided to family.     Physical Function:  PPS: 50-60 at baseline    History Of Present Illness:    Per H&P  \"78 y/o male mechanical fall from standing height in his bedroom. Hit his head, no LOC. Patient takes coumadin for history of DVT's. INR on presentation 3.0. Found to have small L frontal SAH and small L subdural hemorrhage. R ribs 5-9 fracture. GCS 15, no neuro deficits. \"    Past Medical History:          Diagnosis Date    Acute myocardial infarction, unspecified site 1995    Myocardial Infarction    [facility]'s disease [facility]     Anemia associated with acute blood loss 4/11/2013    BPH associated with nocturia 4/9/2013    CAD (coronary artery disease), native coronary artery 4/9/2013    DVT, lower extremity, distal ([address] 4/9/2013    HTN, goal below 130/80 4/9/2013    Hx of blood clots     Hyperlipidemia LDL goal < 70 4/9/2013    Pulmonary emboli ([facility] 4/9/2013    Retroperitoneal hematoma 4/11/2013    Shingles 2008       Past Surgical History:          Procedure Laterality Date    BACK SURGERY      CARDIAC DEFIBRILLATOR PLACEMENT      CARDIAC SURGERY      COLONOSCOPY  2010    CORONARY ARTERY BYPASS GRAFT      ECHO COMPL W DOP COLOR FLOW  4/8/2013         ECHO COMPL W DOP COLOR FLOW  4/15/2013         ECHO COMPL W DOP COLOR FLOW  12/6/2013         ENDOSCOPY, COLON, DIAGNOSTIC  2006    HERNIA REPAIR      HERNIA REPAIR  1980    PACEMAKER INSERTION      PACEMAKER PLACEMENT      defibulator no pacemaker    UPPER GASTROINTESTINAL ENDOSCOPY  12/6/13    DR. TO       Medications Prior to Admission:      Prior to Admission medications    Medication Sig Start Date End Date Taking?  Authorizing Provider   warfarin (COUMADIN) 2.5 MG tablet Take 2.5 mg by mouth Tuesday, Thursday, Saturday, Sunday   Yes Historical Provider, MD   warfarin (COUMADIN) 5 MG tablet Take 5 mg by mouth Monday, Wednesday, and Friday   Yes Historical Provider, MD   lisinopril (PRINIVIL;ZESTRIL) 5 MG tablet TAKE 1 TABLET BY MOUTH  DAILY 7/14/21  Yes Flaquita Power MD   labetalol (Dorma Burows) 100 cigarettes. He started smoking about 61 years ago. He has a 20.00 pack-year smoking history. He has never used smokeless tobacco.  ETOH:   reports no history of alcohol use. Family History:      Reviewed in detail and positive as follows: Mother , CAD  Father , rectal carcinoma, pacemaker, CAD  Brother , CVA    REVIEW OF SYSTEMS:   Pertinent positives as noted in the HPI. All other systems reviewed and negative.     OBJECTIVE:   Prognosis: depends upon goals and unknown    Physical Exam:  /82   Pulse 70   Temp 97.8 °F (36.6 °C) (Oral)   Resp 20   Ht 5' 8\" (1.727 m)   Wt 163 lb (73.9 kg)   SpO2 93%   BMI 24.78 kg/m²     Constitutional:  Elderly, NAD, awake, alert  Eyes: no scleral icterus, normal lids, no discharge  ENMT:  Normocephalic, atraumatic, mucosa moist, EOMI  Neck:  trachea midline, no JVD  Lungs:  CTA bilaterally, no audible rhonchi or wheezes noted, respirations unlabored, no retractions  Heart[de-identified]  RRR, distant heart tones, no murmur, rub, or gallop noted during exam  Abd:  Soft, non tender, non distended, bowel sounds present  :  deferred  MSK: sarcopenia present  Ext:  Moving all extremities, no edema, pulses present  Skin:  Warm and dry, no rashes on visible skin  Psych: non-anxious affect  Neuro:  PERRL, Alert, grossly nonfocal; following commands, hard of hearing    Objective data reviewed: labs, images, records, medication use, vitals and chart    Labs:     Recent Labs     21  1306 21  0510   WBC 7.7 8.1   HGB 12.2* 12.4*   HCT 38.4 38.8    153     Recent Labs     21  1306 21  0510    138   K 4.1 4.2    106   CO2 22 24   BUN 16 11   CREATININE 1.0 0.8   CALCIUM 8.9 8.9   PHOS  --  2.9     Recent Labs     21  1822 21  0510   AST 16 16   ALT 15 14   BILIDIR <0.2  --    BILITOT 0.5 0.8   ALKPHOS 63 68     Recent Labs     21  1306 21  1822 21  0510   INR 3.0 1.3 1.3     No results for input(s): Thomas Denny in the last 72 hours. Urinalysis:      Lab Results   Component Value Date    NITRU Negative 12/05/2019    WBCUA NONE 12/04/2013    BACTERIA NONE 12/04/2013    RBCUA NONE 12/04/2013    BLOODU Negative 12/05/2019    SPECGRAV <=1.005 12/05/2019    GLUCOSEU Negative 12/05/2019         Discussed patient and the plan of care with the other IDT members: Palliative Medicine IDT Team, Floor Nurse, Patient and Family    Time/Communication  Greater than 50% of time spent, total 30 minutes in counseling and coordination of care at the bedside regarding goals of care, diagnosis and prognosis and see above. Thank you for allowing Palliative Medicine to participate in the care of Luis Fernando Diallo.

## 2021-07-26 NOTE — PROGRESS NOTES
Trauma Tertiary Survey    Admit Date: 7/25/20213    Hospital day 2    CC:  Fall from standing height       Past Medical History:   Diagnosis Date    Acute myocardial infarction, unspecified site 1995    Myocardial Infarction    [facility] ([facility]     Anemia associated with acute blood loss 4/11/2013    BPH associated with nocturia 4/9/2013    CAD (coronary artery disease), native coronary artery 4/9/2013    DVT, lower extremity, distal ([facility] 4/9/2013    HTN, goal below 130/80 4/9/2013    Hx of blood clots     Hyperlipidemia LDL goal < 70 4/9/2013    [facility] 4/9/2013    Retroperitoneal hematoma 4/11/2013    Shingles 2008       Alcohol pre-screening:  Men: How many times in the past year have you had 5 or more drinks in a day?  none      How much do you drink on a daily basis? 0    Scheduled Meds:   amLODIPine  5 mg Oral Daily    finasteride  5 mg Oral Once per day on Mon Wed Fri    labetalol  100 mg Oral 2 times per day    memantine  10 mg Oral BID    pantoprazole  40 mg Oral QAM AC    ranolazine  500 mg Oral Daily    rivastigmine  3 mg Oral BID    tamsulosin  0.4 mg Oral Daily    levETIRAcetam  500 mg Oral BID    lidocaine  1 patch Transdermal Daily    sodium chloride flush  10 mL Intravenous 2 times per day    polyethylene glycol  17 g Oral Daily    bisacodyl  5 mg Oral Daily    sennosides-docusate sodium  1 tablet Oral BID    methocarbamol  1,500 mg Oral 4x Daily    acetaminophen  650 mg Oral Q6H     Continuous Infusions:   sodium chloride      sodium chloride       PRN Meds:sodium chloride flush, sodium chloride, ondansetron **OR** ondansetron, oxyCODONE **OR** oxyCODONE    Subjective:     Resting comfortably in bed, no complaints. States pain is well controlled. AOx1 GCS 14.   SMI 750mL     Objective:     Patient Vitals for the past 8 hrs:   BP Temp Temp src Pulse Resp SpO2   07/26/21 0600 133/81   82 26 96 %   07/26/21 0500 (!) 135/97   75 19 96 % 07/26/21 0400 124/75 97 °F (36.1 °C) Temporal 71 14 96 %   07/26/21 0300 (!) 125/99   73 21 95 %   07/26/21 0200 129/77   80 15    07/26/21 0100 (!) 145/107   86 29    07/26/21 0000 126/75 98 °F (36.7 °C) Temporal 70 19 96 %       Past Medical History:   Diagnosis Date    Acute myocardial infarction, unspecified site 1995    Myocardial Infarction    Alzheimer's disease ([address]     Anemia associated with acute blood loss 4/11/2013    BPH associated with nocturia 4/9/2013    CAD (coronary artery disease), native coronary artery 4/9/2013    DVT, lower extremity, distal ([address] 4/9/2013    HTN, goal below 130/80 4/9/2013    Hx of blood clots     Hyperlipidemia LDL goal < 70 4/9/2013    Pulmonary emboli ([address] 4/9/2013    Retroperitoneal hematoma 4/11/2013    Shingles 2008       Radiology:  CT ABDOMEN PELVIS W IV CONTRAST Additional Contrast? None   Final Result   Acute lower lateral right rib fractures as seen on recent chest CT. Multiple old fractures otherwise. Mild right-sided hydronephrosis with a 1-2 mm right ureterovesicular junction   stone. Minimal left-sided hydronephrosis without identified stone. This could be   related to the distended urinary bladder. Moderate hiatal hernia. Parenchymal opacities involving the lung bases as seen on the recent chest CT. Other chronic appearing findings. CT HEAD WO CONTRAST   Final Result   1. There is a very small subdural hemorrhage along the left tentorium, not   changed significantly. 2. Tiny extra-axial hemorrhage, likely subarachnoid, in the left frontal   region is unchanged. XR PELVIS (1-2 VIEWS)   Final Result   No acute abnormality seen. Is         CT HEAD WO CONTRAST   Final Result   1. Small focus of subarachnoid hemorrhage in the left frontal region. 2.  No acute osseous abnormality seen in the cervical spine. CT FACIAL BONES WO CONTRAST   Final Result   1. No acute facial fracture. 2. Subcutaneous contusion and hematoma in the right nasal bridge. 3. Subcutaneous contusion in the inferior medial right forehead/right brow. 4. Suspected subdural or subarachnoid hemorrhage along the left frontal   convexity. Please refer to the concurrent head CT for additional detail. CT CERVICAL SPINE WO CONTRAST   Final Result   1. Small focus of subarachnoid hemorrhage in the left frontal region. 2.  No acute osseous abnormality seen in the cervical spine. CT CHEST WO CONTRAST   Final Result   1. Acute nondisplaced fractures of the lateral right 5th through 9th ribs   with associated right extrapleural hematoma. Of note, portions of the right   10th, 11th, and 12th ribs are excluded from the study. Grade 2 chest wall   injury. 2. Trace bilateral pleural effusions, unlikely hemothorax. No pneumothoraces. 3. Mild to moderate bronchial wall thickening with suspected bilateral lower   lobe bronchial secretions potentially due to aspiration, bronchitis, or   reactive airways disease. 4. Persistent discoid atelectasis and/or scarring in each lung base. Superimposed contusion, passive atelectasis, rounded atelectasis, and/or   pneumonia may be present in the right lower lobe. 5. Incidental findings as above. XR SHOULDER LEFT (MIN 2 VIEWS)   Final Result   Diffuse osteopenia without convincing evidence of acute fracture. PHYSICAL EXAM:   GCS:    4 - Opens eyes on own   6 - Follows simple motor commands  4 - Seems confused, disoriented      Pupil size:  Left pinpoint Right pinpoint  Pupil reaction: Yes  Wiggles fingers: Left yes Right yes  Hand grasp:   Left normal  Right normal  Wiggles toes: Left yes   Right yes  Plantar flexion: Left normal Right normal    PHYSICAL EXAM   General: No apparent distress, comfortable.  AOx1  HEENT: Trachea midline, no masses, Pupils equal round, pinpoint  Chest: Respiratory effort was normal with no retractions or use of accessory muscles saturating well on 2L NC. SMI: 750mL  Cardiovascular: Extremities warm, well perfused. Regular rate and rhythm. Abdomen:  Soft and non distended. No tenderness, guarding, rebound, or rigidity. Extremities: Moves all 4 extremities     Spine:     Spine Tenderness ROM   Cervical 0 /10 Normal   Thoracic 0 /10 Normal   Lumbar 0 /10 Normal     Musculoskeletal    Joint Tenderness Swelling ROM   Right shoulder absent absent normal   Left shoulder absent absent normal   Right elbow absent absent normal   Left elbow absent absent normal   Right wrist absent absent normal   Left wrist absent absent normal   Right hand grasp absent absent normal   Left hand grasp absent absent normal   Right hip absent absent normal   Left hip absent absent normal   Right knee absent absent normal   Left knee absent absent normal   Right ankle absent absent normal   Left ankle absent absent normal   Right foot absent absent normal   Left foot absent absent normal       CONSULTS: Neurosurgery. Palliative      INJURIES:        Active Problems:    Fall from standing    SDH (subdural hematoma) (HCC)    SAH (subarachnoid hemorrhage) (HCC)    Fracture of multiple ribs of right side    Supratherapeutic INR  Resolved Problems:    * No resolved hospital problems. *        Assessment/Plan:       · Neuro:   [facility] Pain control tylenol q6, oxy, robaxin. NSG recommendations. · CV:  Hx of afib w/ pacer. Monitor hemodynamics. · Pulm: Monitor RR and SpO2, aggressive pulmonary hygiene,   · GI:  Regular diet, monitor bowel function/ Protonix  · Renal: NS @ 100 monitor UOP and electrolytes.      BPH flomax  · ID:  Not indicated   · Endocrine: Monitor glucose goal <180  · MSK: Pain control   · Heme: Warfarin reversal monitor INR     Bowel regime: Glycolax, dulcolax, senokot  Pain control/Sedation:  tylenol q6, oxy, robaxin  DVT prophylaxis: SCD's  GI: reg. diet  Mouth/Eye care: Per patient    Code status:    Full Code  Patient/Family update:  As available    Disposition: sicu      Electronically signed by Romel Calderon MD on 7/26/21 at 7:08 AM EDT

## 2021-07-26 NOTE — PROGRESS NOTES
[facility]  SURGICAL INTENSIVE CARE UNIT (SICU)  ATTENDING PHYSICIAN CRITICAL CARE PROGRESS NOTE     I have examined the patient, reviewed the record,and discussed the case with the APN/  Resident. I have reviewed all relevant labs and imaging data. The following summarizes my clinical findings and independent assessment. Date of admission:  7/25/2021    CC: Mechanical fall     HOSPITAL COURSE:   7/25  78 y/o male mechanical fall from standing height in his bedroom. Hit his head, no LOC. Patient takes coumadin for history of DVT's. INR on presentation 3.0. Found to have small L frontal SAH and small L subdural hemorrhage. R ribs 5-9 fracture. GCS 15, no neuro deficits. 7/26 doing well overnight. GCS of 14 which is baseline. SMI 1500. New Imaging Reviewed:   Chest Xray : Left lower lobe atelectasis, left-sided pacer defibrillator in place mild venous congestion  Bilateral DVT ultrasounds ordered and pending     Physical Exam:  Physical Exam  Constitutional:       Comments: Baseline confusion   HENT:      Head: Normocephalic. Ears:      Comments: Right periorbital ecchymosis  Eyes:      Extraocular Movements: Extraocular movements intact. Pupils: Pupils are equal, round, and reactive to light. Cardiovascular:      Rate and Rhythm: Normal rate. Comments: , Paced  Pulmonary:      Effort: Pulmonary effort is normal. No respiratory distress. Comments: Mild chest wall pain, SMI 1500  Abdominal:      General: Abdomen is flat. There is no distension. Tenderness: There is no abdominal tenderness. There is no guarding or rebound. Musculoskeletal:         General: Normal range of motion. Cervical back: Neck supple. Comments: 5 out of 5 strength upper and lower extremities   Skin:     General: Skin is warm. Neurological:      General: No focal deficit present. Mental Status: He is alert.    Psychiatric:         Mood and Affect: Mood normal. Assessment   Active Problems:    Fall from standing    SDH (subdural hematoma) (HCC)    SAH (subarachnoid hemorrhage) (HCC)    Fracture of multiple ribs of right side    Supratherapeutic INR    Facial contusion  Resolved Problems:    * No resolved hospital problems.  *      Plan   GI: Regular Diet , Senakot  glycolax Dulcolax PRN   Neuro: scheduled Tylenol   prn Oxycodone   ( decrease to 2.5mg or 5mg as he has not received any)  Robaxin ( decrease to 500mg Q 6  Hours) , Lidocaine patch ,  Namenda, exelon ,  Repeat CT head stable, Neurosurgery following  and Maintain Normonatremia >140  Renal: Hep lock IV, Monitor Urine Output, Daily CBC,BMP, Mg,Phos, ionized Ca Proscar , flomax   Musculoskeletal: WBAT all extremities , Spines Clear AM-PAC Score pending   Pulmonary: Aggressive pulmonary hygiene , SMI , Monitor RR and Maintain SpO2 > 92%  ID: No Indication for Antibiotics      Heme: No indication for Transfusion ,   Monitor Hb   Cardiac: Monitor Hemodynamics , Norvasc, labetalol , ranexa  Hold coumadin Hx DVT/PE 2013 Hold lisinopril and lasix until tomorrow , reorder crestor Maintain SBP <160 for traumatic ICH   Endocrine: Maintain glucose <180 while in ICU     DVT Prophylaxis: PCDs, Hold Chemoprophylaxis until  48 hours after stable head CT ( will use heparin secondary to age  DVT US pending   Ulcer Prophylaxis: Home PPI   Tubes and Lines: PIV   Seizure proph:     Keppra  Ancillary consults:   Neurosurgery and Palliative Care PT/OT   Family Update:         As available   CODE Status:       Full Code    Dispo: Telemetry as long as OK with Gina Keenan MD

## 2021-07-26 NOTE — PROGRESS NOTES
Occupational Therapy  OCCUPATIONAL THERAPY INITIAL EVALUATION    [address] [address] [address]    Date:2021                                                  Patient Name: Belen De Los Santos  MRN: [id number]  : 1940  Room: [address] [place] [address]    Evaluating OT: Gaby Vasquez OTR/L #[id number]  Referring Provider: Jeromy Conrad MD  Specific Provider Orders: OT eval and treat; 21    Diagnosis:  Fall from standing, initial encounter [W19. XXXA]  Surgery/Procedure:  none  Pertinent Medical History: MI, Alzheimer's disease, anemia, CAD, HTN, HLD, pulmonary emboli, shingles      Precautions:  Fall Risk, R rib fractures(5-9), O2, [place], cognition    Assessment of current deficits   [x] Functional mobility  [x]ADLs  [x] Strength               [x]Cognition   [x] Functional transfers   [x] IADLs         [x] Safety Awareness   [x]Endurance   [] Fine Coordination              [x] Balance      [] Vision/perception   []Sensation    []Gross Motor Coordination  [] ROM  [x] Delirium                   [] Motor Control     OT PLAN OF CARE   OT POC based on physician orders, patient diagnosis and results of clinical assessment    Frequency/Duration: 2-4 days/wk for 2 weeks PRN   Specific OT Treatment to include:   * Instruction/training on adapted ADL techniques and AE recommendations to increase functional independence within precautions       * Training on energy conservation strategies, correct breathing pattern and techniques to improve independence/tolerance for self-care routine  * Functional transfer/mobility training/DME recommendations for increased independence, safety, and fall prevention  * Patient/Family education to increase follow through with safety techniques and functional independence  * Recommendation of environmental modifications for increased safety with functional transfers/mobility and ADLs  * Cognitive retraining/development of therapeutic activities to improve problem solving, judgement, memory, and attention for increased safety/participation in ADL/IADL tasks  * Therapeutic exercise to improve motor endurance, ROM, and functional strength for ADLs/functional transfers  * Therapeutic activities to facilitate/challenge dynamic balance, stand tolerance for increased safety and independence with ADLs  * Positioning to improve skin integrity, interaction with environment and functional independence  * Delirium prevention/treatment      Recommended Adaptive Equipment: TBD     Home Living: Pt lives at [facility] setup: handicap accessible    Equipment owned: ww    Prior Level of Function: Assistance with ADLs , Assistance with IADLs; ambulated w/ ww  Driving: No  Occupation: not stated    Pain Level: Pt reports 0/10 pain this session  Cognition: A&O: 2/4(oriented to self and year with cues); Follows 1 step directions w/ increased cueing/demonstration of task. Pt mildly impulsive. Re-oriented pt to hospital throughout session.     Memory:  poor   Sequencing:  fair   Problem solving:  Fair-   Judgement/safety:  Fair-    RASS: 0  CAM-ICU: (NT) Delirium     Functional Assessment:  AM-PAC Daily Activity Raw Score: 13/24   Initial Eval Status  Date: 7/26/21 Treatment Status  Date: STGs = LTGs  Time frame: 10-14 days   Feeding Set-up/supervision (seated in bedside chair)     Grooming Moderate Assist (seated)--limited d/t cognition/decreased B shoulder ROM  Stand by Assist    UB Dressing Moderate Assist   Stand by Assist    LB Dressing Moderate Assist w/ AE to don/doff socks   Stand by Assist    Bathing Moderate Assist  Stand by Assist    Toileting Mod Assist   Stand by Assist    Bed Mobility  Supine to sit: Minimal Assist   Sit to supine: NT  Supine to sit: Supervision   Sit to supine: Supervision    Functional Transfers Minimal Assist   Supervision    Functional Mobility Minimal Assist w/ ww (longer household distance in hallway) Supervision    Balance Sitting:     Dynamic: Min. A  Standing: Min. A w/ ww  Sitting:     Dynamic: Supervision  Standing: Supervision   Activity Tolerance fair  good   Visual/  Perceptual Glasses: readers    WFL                Hand Dominance R   AROM (PROM) Strength Additional Info:    RUE  Proximally: limited to <mid range shoulder flexion  Distally: WFL Grossly 4-/5 good  and wfl FMC/dexterity noted during ADL tasks       LUE Proximally: limited to <mid range shoulder flexion  Distally: WFL Grossly 4-/5 good  and wfl FMC/dexterity noted during ADL tasks       Hearing: [place]  Sensation:  Pt c/o numbness/tingling in fingertips   Tone: WFL   Edema: none noted      Vitals:   HR at rest: 59 bpm HR at end of session: 85 bpm   Spo2 at rest:96% Spo2 at end of session 96%   BP at rest:119/62 mmHg BP at end of session 123/73 mmHg       Comments: OK from RN to see patient. Upon arrival, patient lying in bed. Pt demo fair tolerance with fair- understanding of education/techniques. At end of session, patient seated in bedside chair (RN Aware). Call light within reach, all lines and tubes intact. Pt instructed on use of call light for assistance and fall prevention. Line management and environmental modifications made prior to and end of session to ensure patient safety and to increase efficiency of session. Skilled monitoring of HR, O2 saturation, blood pressure and patient's response to activity performed throughout session. Overall, pt presents with decreased activity tolerance, dynamic balance, functional mobility limiting completion of ADLs and safe return home. Pt can benefit from continued skilled OT to increase safety and functional independence. Treatment:   · Bed mobility: Facilitated bed mobility with cues for proper body mechanics and sequencing to prepare for ADL completion.   · Functional transfers: Facilitated transfers from EOB, bedside chair(sit<>Stand) with mod cues for body alignment, safety and hand placement. · ADL completion: Self-care retraining for the above-mentioned ADLs; training on proper hand placement, safety technique, sequencing, and energy conservation techniques. Educated pt on LB AE d/t decreased reach, pt demo fair follow through of technique. · Postural Balance: Sitting and/or standing balance retraining to improve righting reactions with postural changes during ADLS. Use of ww for support in standing. · Cognitive/Perceptual training: retraining exercises to improve attention, mentation, and spatial awareness for ADLs & transfers. Cueing for safety throughout session. · Skilled positioning: Proper positioning to improve interaction with environment, overall functioning and decrease/prevent edema and contractures. · Delirium Prevention/Management: Implementation of non-pharmacologic interventions for delirium prevention incorporated throughout session to patient. Including environmental and sensory modifications to decrease patient's internal distraction caused by delirium, and improve overall mentation. Assistance of 2 required for safety d/t pt's medical complexity, line management and pt's deconditioned. Rehab Potential: Good for established goals     Patient / Family Goal: not stated      Patient and/or family were instructed on functional diagnosis, prognosis/goals and OT plan of care. Demonstrated fair- understanding. Eval Complexity: Mod  · History: Expanded chart review of consults, imaging, and psychosocial history related to current functional performance. · Exam: 5+ performance deficits identified limiting functional independence and safe return home   · Assistance/Modification: Min/mod assistance or modifications required to perform tasks. May have comorbidities that affect occupational performance.     Time In: 1:10  Time Out: 1:35  Total Treatment Time: 15 minutes    Min Units   OT Eval Low 03949       OT Eval Medium 97166  x 1   OT Eval High 98583 OT Re-Eval [id number]       Therapeutic Ex [id number]       Therapeutic Activities 04535  10  1   ADL/Self Care 00947  5     Orthotic Management 68610       Neuro Re-Ed 40339       Non-Billable Time          Evaluation Time includes thorough review of current medical information, gathering information on past medical history/social history and prior level of function, completion of standardized testing/informal observation of tasks, assessment of data and education on plan of care and goals.           Danica Hart, OTR/L #[id number]

## 2021-07-26 NOTE — PROGRESS NOTES
Physical Therapy  Physical Therapy Initial Assessment     Name: Enma Hendrix  : 1940  MRN: [id number]      Date of Service: 2021    Evaluating PT:  Rohith Patel PT, DPT [id number]    Room #:  1511/8134-B  Diagnosis:  Fall from standing, initial encounter [W19. XXXA]  PMHx/PSHx:  Alzheimer's disease. CAD, HLD, HTN, MI, Shingles  Procedure/Surgery:  None  Precautions:  Falls, O2, bed alarm, R rib 5-9 fxs  Equipment Needs:  [employer] - pt already owns    SUBJECTIVE:    Pt lives at [facility]. Pt ambulated with [employer] PTA. OBJECTIVE:   Initial Evaluation  Date: 21 Treatment Short Term/ Long Term   Goals   AM-PAC 6 Clicks 63/09     Was pt agreeable to Eval/treatment? Yes     Does pt have pain?  No c/o pain     Bed Mobility  Rolling: NT  Supine to sit: Andria with HOB elevated  Sit to supine: NT  Scooting: Andria  SBA   Transfers Sit to stand: Andria  Stand to sit: Andria  Stand pivot: Andria with [employer]  SBA with [employer]   Ambulation   75 feet x 2 reps with Andria with [employer]  >150 feet with SBA with [employer]   Stair negotiation: ascended and descended NA     ROM BUE:  Defer to OT note  BLE:  WFL     Strength BUE:  Defer to OT note  BLE:  4/5  Increase by 1/3 MMT grade   Balance Sitting EOB:  Andria  Dynamic Standing:  Andria with [employer]  Sitting EOB:  Independent  Dynamic Standing:  SBA with [employer]     Pt is A & O x 1 self  CAM-ICU: NT  RASS: 0  Sensation:  Fingertips of R hand paresthesias  Edema:  None    Vitals:  Heart Rate at rest 60 bpm Heart Rate post session 85 bpm   SpO2 at rest 96% SpO2 post session 96%   Blood Pressure at rest 119/62 mmHg Blood Pressure post session 123/73 mmHg       Functional Status Score-Intensive Care Unit (FSS-ICU)   Rolling -/7   Supine to sit transfer 4/7   Unsupported sitting  4/7   Sit to stand transfers 4/7   Ambulation 4/7   Total  16/35       Therapeutic Exercises:  NA    Patient education  Pt educated on safety    Patient response to education:   Pt verbalized understanding Pt demonstrated skill Pt requires further education in this area   partial partial x     ASSESSMENT:    Conditions Requiring Skilled Therapeutic Intervention:    [x]Decreased strength     []Decreased ROM  [x]Decreased functional mobility  [x]Decreased balance   [x]Decreased endurance   [x]Decreased posture  []Decreased sensation  []Decreased coordination   []Decreased vision  [x]Decreased safety awareness   []Increased pain       Comments:  RN reported pt was stable for session. Pt was in bed upon arrival, agreeable to initial evaluation. Pt was reoriented to time, place and situation. Minimal trunk assistance provided for bed mobility. Poor safety awareness with all tasks and pt was quick moving. Occasional assistance provided for [facility] management. Fatigue limited activity. Pt was left in chair with all needs met and call light in reach. All lines remained intact. RN notified. Treatment:  Patient practiced and was instructed in the following treatment:     Bed mobility training - pt given verbal and tactile cues to facilitate proper sequencing and safety during supine>sit as well as provided with physical assistance.  Sitting EOB for >5 minutes for upright tolerance, postural awareness and BLE ROM   Transfer training - pt was given verbal and tactile cues to facilitate proper hand placement, technique and safety during sit to stand, stand to sit and stand pivot transfers as well as provided with physical assistance.  Gait training- pt was given verbal and tactile cues to facilitate safety, balance, and use of AD during ambulation as well as provided with physical assistance. Pt's/ family goals   1. Return home    Prognosis is good for reaching above PT goals. Patient and or family understand(s) diagnosis, prognosis, and plan of care.   yes    PHYSICAL THERAPY PLAN OF CARE:    PT POC is established based on physician order and patient diagnosis     Referring provider/PT Order:  Cecil Nails MD /07/25/21 1915 PT diana william treat  Diagnosis:  Fall from standing, initial encounter [W19. XXXA]  Specific instructions for next treatment:  Progress activity    Current Treatment Recommendations:     [x] Strengthening to improve independence with functional mobility   [] ROM to improve independence with functional mobility   [x] Balance Training to improve static/dynamic balance and to reduce fall risk  [x] Endurance Training to improve activity tolerance during functional mobility   [x] Transfer Training to improve safety and independence with all functional transfers   [x] Gait Training to improve gait mechanics, endurance and asses need for appropriate assistive device  [x] Stair Training in preparation for safe discharge home and/or into the community   [] Positioning to prevent skin breakdown and contractures  [x] Safety and Education Training   [x] Patient/Caregiver Education   [] HEP  [] Other     PT long term treatment goals are located in above grid    Frequency of treatments: 2-5x/week x 1-2 weeks. Time in  1300  Time out  1320    Total Treatment Time  10 minutes     Evaluation Time includes thorough review of current medical information, gathering information on past medical history/social history and prior level of function, completion of standardized testing/informal observation of tasks, assessment of data and education on plan of care and goals.     CPT codes:  [] Low Complexity PT evaluation 27067  [x] Moderate Complexity PT evaluation 66930  [] High Complexity PT evaluation 02521  [] PT Re-evaluation 78885  [] Gait training 98798 - minutes  [] Manual therapy 18548 - minutes  [x] Therapeutic activities 06879 10 minutes  [] Therapeutic exercises 54910 - minutes  [] Neuromuscular reeducation 46648 - minutes     Mando Gill, PT, DPT  [id number]

## 2021-07-26 NOTE — PROGRESS NOTES
Palliative Medicine Social Work     Patient Name: Carmela Ashraf  Age: 79 y.o.  Status: no    Next of Tia Vance               :[phone]    Additional Support: adult children: Lucindaannabelle Du  and Lg(Andree)    Minor Children: no    Advanced Directives:yes both on file. HCPOA appoints wife Faith Tran as main agent, alternates are Dean Ludlow and [employer]    Confirm Code Status: full, to be reveiwed    Current Goals of Care: live longer, improve or maintain function/ quality of life, remain at home and continue current management    Mental Health History: no    Substance Abuse:no    Indications of Abuse/Neglect: no    Financial Concerns: no    Living Situation: resides at [address] [place] [address] Needs Met: yes    Emotional Needs Met:yes. Assessment: 80 yo  male , admitted after falling in his apartment, resulting in   small L frontal SAH and small L subdural hemorrhage. R ribs 5-9 fracture. He has a history of Alzheimer's. Pt seen at bedside, he is awake, daughter Lucinda Du present, she states his wife will be in later today. Team to review code status/ goals of care with wife.

## 2021-07-26 NOTE — CONSULTS
Smoker     Packs/day: 1.00     Years: 20.00     Pack years: 20.00     Types: Cigarettes     Start date: 1960     Quit date: 1980     Years since quittin.3    Smokeless tobacco: Never Used   Vaping Use    Vaping Use: Never used   Substance and Sexual Activity    Alcohol use: No    Drug use: No    Sexual activity: Not on file   Other Topics Concern    Not on file   Social History Narrative    ** Merged History Encounter **          Social Determinants of Health     Financial Resource Strain: Low Risk     Difficulty of Paying Living Expenses: Not hard at all   Food Insecurity: No Food Insecurity    Worried About Running Out of Food in the Last Year: Never true    Tin of Food in the Last Year: Never true   Transportation Needs:     Lack of Transportation (Medical):      Lack of Transportation (Non-Medical):    Physical Activity:     Days of Exercise per Week:     Minutes of Exercise per Session:    Stress:     Feeling of Stress :    Social Connections:     Frequency of Communication with Friends and Family:     Frequency of Social Gatherings with Friends and Family:     Attends Synagogue Services:     Active Member of Clubs or Organizations:     Attends Club or Organization Meetings:     Marital Status:    Intimate Partner Violence:     Fear of Current or Ex-Partner:     Emotionally Abused:     Physically Abused:     Sexually Abused:        Medications:   Current Facility-Administered Medications   Medication Dose Route Frequency Provider Last Rate Last Admin    amLODIPine (NORVASC) tablet 5 mg  5 mg Oral Daily Amanda Meckel, MD   5 mg at 21 08    finasteride (PROSCAR) tablet 5 mg  5 mg Oral Once per day on  Amanda Meckel, MD   5 mg at 21    labetalol (NORMODYNE) tablet 100 mg  100 mg Oral 2 times per day Amanda Meckel, MD   100 mg at 21    memantine (NAMENDA) tablet 10 mg  10 mg Oral BID Amanda Meckel, MD 10 mg at 07/26/21 0804    pantoprazole (PROTONIX) tablet 40 mg  40 mg Oral QAM AC Bryanna Smith MD   40 mg at 07/26/21 3031    ranolazine (RANEXA) extended release tablet 500 mg  500 mg Oral Daily Bryanna Smith MD   500 mg at 07/26/21 0804    rivastigmine (EXELON) capsule 3 mg  3 mg Oral BID Bryanna Smith MD   3 mg at 07/26/21 0804    tamsulosin (FLOMAX) capsule 0.4 mg  0.4 mg Oral Daily Bryanna Smith MD   0.4 mg at 07/26/21 0804    levETIRAcetam (KEPPRA) tablet 500 mg  500 mg Oral BID Bryanna Smith MD   500 mg at 07/26/21 0804    oxyCODONE (ROXICODONE) immediate release tablet 2.5 mg  2.5 mg Oral Q4H PRN Bryanna Smith MD        Or   Alpesh Huffman oxyCODONE (ROXICODONE) immediate release tablet 5 mg  5 mg Oral Q4H PRN Bryanna Smith MD        methocarbamol (ROBAXIN) tablet 500 mg  500 mg Oral 4x Daily Bryanna Smith MD   500 mg at 07/26/21 1342    rosuvastatin (CRESTOR) tablet 10 mg  10 mg Oral Nightly Bryanna Smith MD        [START ON 7/27/2021] furosemide (LASIX) tablet 20 mg  20 mg Oral Every Other Day Bryanna mSith MD        lidocaine 4 % external patch 1 patch  1 patch Transdermal Daily Zia Gaona MD   1 patch at 07/26/21 0803    sodium chloride flush 0.9 % injection 10 mL  10 mL Intravenous 2 times per day Zai Gaona MD   10 mL at 07/26/21 0804    sodium chloride flush 0.9 % injection 10 mL  10 mL Intravenous PRN Zia Gaona MD        0.9 % sodium chloride infusion  25 mL Intravenous PRN Zia Gaona MD        ondansetron (ZOFRAN-ODT) disintegrating tablet 4 mg  4 mg Oral Q8H PRN Zia Gaona MD        Or    ondansetron [facility]) injection 4 mg  4 mg Intravenous Q6H PRN Zia Gaona MD        polyethylene glycol [facility]) packet 17 g  17 g Oral Daily Zia Gaona MD   17 g at 07/26/21 0805    sennosides-docusate sodium (SENOKOT-S) 8.6-50 MG tablet 1 tablet  1 tablet Oral BID Zia Gaona MD 1 tablet at 07/26/21 0804    acetaminophen (TYLENOL) tablet 650 mg  650 mg Oral Q6H Blossom Valdes MD   650 mg at 07/26/21 1342        Allergies:    Heparin, Tape [adhesive tape], and Tape [adhesive tape]       Review of Systems:  Unreliable    Physical Examination:    /70   Pulse 72   Temp 97.9 °F (36.6 °C) (Oral)   Resp 20   Ht 5' 8\" (1.727 m)   Wt 163 lb (73.9 kg)   SpO2 97%   BMI 24.78 kg/m²       On focused neurological examination, he  is awake alert oriented and rationally conversant. Speech is clear and fluent. Pupils are equal and reactive to light bilaterally, extraocular movements are intact, visual fields are full to confrontation. His  face is symmetric and grossly intact to fine touch. Uvula and tongue are both midline. Shoulder shrug is symmetric and strong. Cervical spine is well aligned and nontender to direct palpation. Motor examination reveals preserved power in the upper and lower extremities at 5 out of 5 throughout. Reflexes are symmetric and brisk. Plantar responses are downgoing. There is no clonus. Patient is intact to fine touch in all dermatomes throughout. Straight leg raise is negative. Palpation of the spine reveals no kyphotic or scoliotic gross deformities. He  can forward flex to well below the knee. There is no pain to deep percussion nor palpation. ASSESSMENT:  I personally reviewed Kelly Minor's radiographic images, particularly ount of right tentorial SDH.    [unfilled]    MEDICAL DECISION MAKING & PLAN:  Surgical intervention is required at this time. Please reconsult us or call anytime with questions.     Thank you so much for allowing us to participate in the care of this patient we agree that anticoagulation should be held for at least 7 to 10 days    Electronically signed by Kate Merlos MD on 7/26/2021 at 4:05 PM

## 2021-07-26 NOTE — CARE COORDINATION
Attempted to meet with Pt and family x2 but family was not present this morning or this afternoon. Left VM for Wife Maddi Alexandra to return call. Pt is on 2 L O2. Pt has Alzheimer's and lies with Wife at [address] [place] [address]. Discharge Plan is undetermined at this time. SW/NATALIE to follow for discharge needs.    ERAN Ham.W.  [phone]

## 2021-07-26 NOTE — PLAN OF CARE
Problem: Falls - Risk of:  Goal: Will remain free from falls  Description: Will remain free from falls  7/26/2021 1015 by Mariella Michael RN  Outcome: Met This Shift  7/26/2021 0027 by Natty Cross RN  Outcome: Met This Shift     Problem: Falls - Risk of:  Goal: Absence of physical injury  Description: Absence of physical injury  7/26/2021 1015 by Mariella Michael RN  Outcome: Met This Shift  7/26/2021 0027 by Natty Cross RN  Outcome: Met This Shift     Problem: Pain:  Description: Pain management should include both nonpharmacologic and pharmacologic interventions. Goal: Pain level will decrease  Description: Pain level will decrease  7/26/2021 1015 by Mariella Michael RN  Outcome: Met This Shift  7/26/2021 0027 by Natty Cross RN  Outcome: Met This Shift     Problem: Pain:  Description: Pain management should include both nonpharmacologic and pharmacologic interventions. Goal: Control of acute pain  Description: Control of acute pain  7/26/2021 1015 by Mariella Michael RN  Outcome: Met This Shift  7/26/2021 0027 by Natty Cross RN  Outcome: Met This Shift     Discussed plan of care with patient/family. Patient/family incorporated into plan of care.

## 2021-07-26 NOTE — DISCHARGE SUMMARY
Physician Discharge Summary     Patient ID:  Enma Hendrix  [id number]  8 y.o.  1940    Admit date: 7/25/2021    Discharge date and time: No discharge date for patient encounter. Admitting Physician: Jing Darden MD     Admission Diagnoses: Fall from standing, initial encounter [W19. XXXA]    Discharge Diagnoses: Active Problems:    Fall    SDH (subdural hematoma) (HCC)    SAH (subarachnoid hemorrhage) (HCC)    Fracture of multiple ribs of right side    Supratherapeutic INR    Facial contusion  Resolved Problems:    * No resolved hospital problems. 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 SMI 1500. GCS 14, awaiting neurosurgery recs  7/27: patient would like to return home. SMI 1500, GCS 15. Urology requests PVR. No surgical intervention warranted. 7/28: No acute events overnight. Patient easily arousable in good spirits. Denies any pain. Was supposed to be discharged home yesterday but family would prefer long term care. Plan for [employer] today. Consults:   IP CONSULT TO NEUROSURGERY  IP CONSULT TO PALLIATIVE CARE  IP CONSULT TO UROLOGY    Significant Diagnostic Studies:   XR PELVIS (1-2 VIEWS)    Result Date: 7/25/2021  EXAMINATION: ONE XRAY VIEW OF THE PELVIS 7/25/2021 5:08 pm COMPARISON: None. HISTORY: ORDERING SYSTEM PROVIDED HISTORY: fall from standing TECHNOLOGIST PROVIDED HISTORY: Reason for exam:->fall from standing What reading provider will be dictating this exam?->CRC FINDINGS: Bones are osteopenic. I cannot confirm acute fracture. There is no dislocation. Hip joint spaces are moderately narrowed, not significantly changed. There are iliofemoral atherosclerotic calcifications. There are degenerative changes in visualized lower lumbar spine. Is     No acute abnormality seen. Is     CT HEAD WO CONTRAST    Result Date: 7/25/2021  EXAMINATION: CT OF THE HEAD WITHOUT CONTRAST  7/25/2021 5:29 pm TECHNIQUE: CT of the head was performed without the administration of intravenous contrast. Dose modulation, iterative reconstruction, and/or weight based adjustment of the mA/kV was utilized to reduce the radiation dose to as low as reasonably achievable. COMPARISON: 07/25/2021 at 13:38 HISTORY: ORDERING SYSTEM PROVIDED HISTORY: follow up prior hemorrhage at 17:30 or later TECHNOLOGIST PROVIDED HISTORY: Reason for exam:->follow up prior hemorrhage at 17:30 or later Has a \"code stroke\" or \"stroke alert\" been called? ->No Decision Support Exception - unselect if not a suspected or confirmed emergency medical condition->Emergency Medical Condition (MA) What reading provider will be dictating this exam?->CRC FINDINGS: There is a left tentorial subdural hematoma which is about 2.5 mm, not changed significantly. Minimal extra-axial blood in the left frontal region is not changed. There is no edema, mass effect or shift. Cerebral atrophy is unchanged. Old lacunar infarct involving the left basal ganglia is unchanged. Ventricular size is appropriate for brain volume. 1. There is a very small subdural hemorrhage along the left tentorium, not changed significantly. 2. Tiny extra-axial hemorrhage, likely subarachnoid, in the left frontal region is unchanged.      CT HEAD WO CONTRAST    Result Date: 7/25/2021  EXAMINATION: CT OF THE HEAD WITHOUT CONTRAST; CT OF THE CERVICAL SPINE WITHOUT CONTRAST 7/25/2021 1:36 pm TECHNIQUE: CT of the head was performed without the administration of intravenous contrast. Dose modulation, iterative reconstruction, and/or weight based adjustment of the mA/kV was utilized to reduce the radiation dose to as low as reasonably achievable.; CT of the cervical spine was performed without the administration of intravenous contrast. Multiplanar reformatted images are provided for review. Dose modulation, iterative reconstruction, and/or weight based adjustment of the mA/kV was utilized to reduce the radiation dose to as low as reasonably achievable. COMPARISON: Multiple prior studies, the most recent dated April 1, 2019 HISTORY: ORDERING SYSTEM PROVIDED HISTORY: head injury TECHNOLOGIST PROVIDED HISTORY: Has a \"code stroke\" or \"stroke alert\" been called? ->No Reason for exam:->head injury Decision Support Exception - unselect if not a suspected or confirmed emergency medical condition->Emergency Medical Condition (MA) What reading provider will be dictating this exam?->CRC FINDINGS: Head: BRAIN/VENTRICLES: Small focus of subarachnoid hemorrhage noted in the left frontal region. There is no mass effect or midline shift. No abnormal extra-axial fluid collection. The gray-white differentiation is maintained without evidence of an acute infarct. There is no evidence of hydrocephalus. Generalized volume loss and chronic small vessel disease noted as well as old bilateral lacunar infarcts. ORBITS: The visualized portion of the orbits demonstrate no acute abnormality. SINUSES: Partial right mastoid effusion. Left mastoid air cells are clear. There is partial opacification of all the visualized paranasal sinuses. Polyp versus mucous retention cyst in the right maxillary sinus. SOFT TISSUES/SKULL:  No acute abnormality of the visualized skull or soft tissues. Cervical Spine: BONES/ALIGNMENT: There is no acute fracture or traumatic malalignment. DEGENERATIVE CHANGES: Multilevel osteophytes and disc space narrowing noted. SOFT TISSUES: There is no prevertebral soft tissue swelling. Heterogeneous thyroid gland. suspicion for subarachnoid or subdural hematoma along the left frontal convexity. 1. No acute facial fracture. 2. Subcutaneous contusion and hematoma in the right nasal bridge. 3. Subcutaneous contusion in the inferior medial right forehead/right brow. 4. Suspected subdural or subarachnoid hemorrhage along the left frontal convexity. Please refer to the concurrent head CT for additional detail. CT CHEST WO CONTRAST    Result Date: 7/25/2021  EXAMINATION: CT OF THE CHEST WITHOUT CONTRAST 7/25/2021 1:36 pm TECHNIQUE: CT of the chest was performed without the administration of intravenous contrast. Multiplanar reformatted images are provided for review. Dose modulation, iterative reconstruction, and/or weight based adjustment of the mA/kV was utilized to reduce the radiation dose to as low as reasonably achievable. COMPARISON: Chest radiograph 12/30/2020, chest CT 04/01/2019 HISTORY: ORDERING SYSTEM PROVIDED HISTORY: right rib pain, fall TECHNOLOGIST PROVIDED HISTORY: Reason for exam:->right rib pain, fall Decision Support Exception - unselect if not a suspected or confirmed emergency medical condition->Emergency Medical Condition (MA) What reading provider will be dictating this exam?->CRC FINDINGS: Lack of intravenous contrast administration, partially limiting evaluation of the soft tissues and vasculature. MEDIASTINUM:  Unchanged left subclavian dual chamber cardioverter with intact appearing leads in expected position. Mild cardiomegaly. Unchanged subendocardial fat in the left ventricular apex suggestive of prior myocardial infarction. Mitral and aortic valve annular calcifications. No pericardial effusion. Moderate coronary atherosclerotic calcifications status post bypass grafting. Normal variant common origin of the brachiocephalic and left common carotid arteries. Mild systemic atherosclerotic calcifications. No mediastinal nor obvious hilar lymphadenopathy.   Partially calcified right paraesophageal and right interlobar lymph nodes, likely sequela of granulomatous disease. Distention of the proximal to mid thoracic esophagus with some retained fluid, a potential risk factor for aspiration. LUNGS/PLEURA:  Partial collapse of the central airways on some images likely due to expiration during image acquisition. Suspected bronchial secretions in the bilateral lower lobes. Mild to moderate bronchial wall thickening most prominent centrally. Persistent discoid atelectasis and/or scarring in each lung base, remaining more prominent on the right. Possibly associated persistent consolidative opacity with volume loss in the right lower lobe. Gravity dependent atelectasis bilaterally. Calcified granulomas in the right lower lobe. Areas of ill-defined right extrapleural hematoma. Trace bilateral pleural effusions. No pneumothoraces. UPPER ABDOMEN:  Severe pancreatic atrophy and fatty replacement. Splenic granulomatous calcifications. Simple appearing bilateral renal cysts measuring up to 2.0 cm (Bosniak category 2). Punctate nonobstructing calculus in an upper pole calyx of the right renal collecting system. Small sliding hiatal hernia. Colonic diverticula near the splenic flexure without findings of acute diverticulitis. SOFT TISSUES/BONES:   No supraclavicular nor axillary lymphadenopathy. Minimal bilateral gynecomastia. Suture anchors in the right humeral head suggestive of prior tendon repair. Healed median sternotomy. Diffuse bony demineralization consistent with osteoporosis given unchanged chronic compression deformities at a few levels in the thoracic spine. Acute nondisplaced fractures of the lateral right 5th through 9th ribs. Multiple healed bilateral rib fractures. Superior subluxation of the bilateral humeral heads with respect to the respective scapular glenoid processes. Joints otherwise maintain anatomic alignment.   Moderate to severe osteoarthritic changes of the bilateral glenohumeral joints associated with bilateral joint effusions and intra-articular loose bodies (possibly osteochondromatosis). 1. Acute nondisplaced fractures of the lateral right 5th through 9th ribs with associated right extrapleural hematoma. Of note, portions of the right 10th, 11th, and 12th ribs are excluded from the study. Grade 2 chest wall injury. 2. Trace bilateral pleural effusions, unlikely hemothorax. No pneumothoraces. 3. Mild to moderate bronchial wall thickening with suspected bilateral lower lobe bronchial secretions potentially due to aspiration, bronchitis, or reactive airways disease. 4. Persistent discoid atelectasis and/or scarring in each lung base. Superimposed contusion, passive atelectasis, rounded atelectasis, and/or pneumonia may be present in the right lower lobe. 5. Incidental findings as above. CT CERVICAL SPINE WO CONTRAST    Result Date: 7/25/2021  EXAMINATION: CT OF THE HEAD WITHOUT CONTRAST; CT OF THE CERVICAL SPINE WITHOUT CONTRAST 7/25/2021 1:36 pm TECHNIQUE: CT of the head was performed without the administration of intravenous contrast. Dose modulation, iterative reconstruction, and/or weight based adjustment of the mA/kV was utilized to reduce the radiation dose to as low as reasonably achievable.; CT of the cervical spine was performed without the administration of intravenous contrast. Multiplanar reformatted images are provided for review. Dose modulation, iterative reconstruction, and/or weight based adjustment of the mA/kV was utilized to reduce the radiation dose to as low as reasonably achievable. COMPARISON: Multiple prior studies, the most recent dated April 1, 2019 HISTORY: ORDERING SYSTEM PROVIDED HISTORY: head injury TECHNOLOGIST PROVIDED HISTORY: Has a \"code stroke\" or \"stroke alert\" been called? ->No Reason for exam:->head injury Decision Support Exception - unselect if not a suspected or confirmed emergency medical condition->Emergency Medical Condition (MA) What reading provider will be dictating this exam?->CRC FINDINGS: Head: BRAIN/VENTRICLES: Small focus of subarachnoid hemorrhage noted in the left frontal region. There is no mass effect or midline shift. No abnormal extra-axial fluid collection. The gray-white differentiation is maintained without evidence of an acute infarct. There is no evidence of hydrocephalus. Generalized volume loss and chronic small vessel disease noted as well as old bilateral lacunar infarcts. ORBITS: The visualized portion of the orbits demonstrate no acute abnormality. SINUSES: Partial right mastoid effusion. Left mastoid air cells are clear. There is partial opacification of all the visualized paranasal sinuses. Polyp versus mucous retention cyst in the right maxillary sinus. SOFT TISSUES/SKULL:  No acute abnormality of the visualized skull or soft tissues. Cervical Spine: BONES/ALIGNMENT: There is no acute fracture or traumatic malalignment. DEGENERATIVE CHANGES: Multilevel osteophytes and disc space narrowing noted. SOFT TISSUES: There is no prevertebral soft tissue swelling. Heterogeneous thyroid gland. 1.  Small focus of subarachnoid hemorrhage in the left frontal region. 2.  No acute osseous abnormality seen in the cervical spine. CT ABDOMEN PELVIS W IV CONTRAST Additional Contrast? None    Result Date: 7/25/2021  EXAMINATION: CT OF THE ABDOMEN AND PELVIS WITH CONTRAST 7/25/2021 8:40 pm TECHNIQUE: CT of the abdomen and pelvis was performed with the administration of intravenous contrast. Multiplanar reformatted images are provided for review. Dose modulation, iterative reconstruction, and/or weight based adjustment of the mA/kV was utilized to reduce the radiation dose to as low as reasonably achievable.  COMPARISON: 12/05/2019 HISTORY: ORDERING SYSTEM PROVIDED HISTORY: fall from standing TECHNOLOGIST PROVIDED HISTORY: Reason for exam:->fall from standing Additional Contrast?->None What reading provider will be dictating this exam?->CRC FINDINGS: There is some streak artifact due to overlying external densities. Gallbladder is unremarkable. Liver is homogeneous. Spleen is normal in size. Patient motion artifact also limits assessment. Extensive diffuse fatty replacement of the pancreas. No adrenal mass. 1-2 mm stone at the right ureterovesicular junction with minimal hydronephrosis. Duplicated left renal collecting system also demonstrating mild hydronephrosis. No left ureteral stone and the left-sided hydronephrosis could be related to the moderately distended urinary bladder. This could be assessed with retrograde evaluation if indicated. Nonobstructive bilateral nephrolithiasis, measuring 3 mm on the right and 3 mm on the left. Bilateral low-attenuation renal lesions likely representing multiple cysts. 1 of these exophytic from the posterior upper pole on the left is more dense than expected for a simple cyst but is unchanged and likely represents a complex or hemorrhagic cyst measuring 10 mm. Inferior vena caval filter. Scattered vascular calcifications. Assessment of bowel is limited without oral contrast.  Moderate hiatal hernia. No bowel obstruction. Moderate stool burden. Mild diverticulosis without evidence of acute diverticulitis. No free fluid, free air or localized fluid collection. Heterogeneous mild prominence of the prostate gland. Trace right pleural effusion. Moderate to severe cardiomegaly. Partial visualization of cardiac pacemaker leads. Airspace opacities involving the right greater than left lung base are consistent with areas of atelectasis although developing infiltrates from pneumonia are not excluded. Numerous old bilateral rib fractures. Acute fractures of the lateral 5th through 9th ribs on the right partially included in the field of view. Degenerative changes are present in the spine and pelvis.   Old fractures of the left superior and inferior pubic rami.  Mild compression deformities in the lower thoracic spine and in the upper to mid lumbar spine also favored to be old. Acute lower lateral right rib fractures as seen on recent chest CT. Multiple old fractures otherwise. Mild right-sided hydronephrosis with a 1-2 mm right ureterovesicular junction stone. Minimal left-sided hydronephrosis without identified stone. This could be related to the distended urinary bladder. Moderate hiatal hernia. Parenchymal opacities involving the lung bases as seen on the recent chest CT. Other chronic appearing findings. XR SHOULDER LEFT (MIN 2 VIEWS)    Result Date: 7/25/2021  EXAMINATION: THREE XRAY VIEWS OF THE LEFT SHOULDER 7/25/2021 1:31 pm COMPARISON: None. HISTORY: ORDERING SYSTEM PROVIDED HISTORY: trauma TECHNOLOGIST PROVIDED HISTORY: Reason for exam:->trauma What reading provider will be dictating this exam?->CRC FINDINGS: Bones are diffusely osteopenic. No convincing evidence of acute fracture or dislocation. The left humeral head is extremely high-riding, in keeping with chronic rotator cuff pathology. No aggressive osseous lesion. Diffuse osteopenia without convincing evidence of acute fracture. XR CHEST PORTABLE    Result Date: 7/26/2021  EXAMINATION: ONE XRAY VIEW OF THE CHEST 7/26/2021 7:12 am COMPARISON: 30 December 2020 HISTORY: ORDERING SYSTEM PROVIDED HISTORY: rib fractures TECHNOLOGIST PROVIDED HISTORY: Reason for exam:->rib fractures What reading provider will be dictating this exam?->CRC FINDINGS: Unchanged AICD on the left. Suboptimal inspiration with crowding of bronchovascular markings at the bases and possible mild left basilar atelectasis. Neither costophrenic angle is blunted. Suboptimal inspiration with crowding of bronchovascular markings and possible atelectasis on the left.        Discharge Exam:  GCS 14, baseline confused  Follows commands  Heart rate is paced  Breathing comfortably no distress, minimal chest wall ranolazine (RANEXA) 500 MG extended release tablet TAKE 1 TABLET BY MOUTH  DAILY  Qty: 90 tablet, Refills: 3    Comments: Requesting 1 year supply      omeprazole (PRILOSEC) 40 MG delayed release capsule TAKE 1 CAPSULE BY MOUTH IN  THE MORNING  Qty: 90 capsule, Refills: 3    Comments: Requesting 1 year supply      vitamin B-12 (CYANOCOBALAMIN) 1000 MCG tablet Take 1,000 mcg by mouth daily      finasteride (PROSCAR) 5 MG tablet Take 5 mg by mouth three times a week On Monday, Wednesday, and Friday      tamsulosin (FLOMAX) 0.4 MG capsule Take 0.4 mg by mouth daily      magnesium hydroxide (MILK OF MAGNESIA) 400 MG/5ML suspension Take by mouth daily as needed for Constipation      acetaminophen (TYLENOL) 500 MG tablet Take 500 mg by mouth as needed for Pain \"Seldom takes\". Cholecalciferol (VITAMIN D3) 2000 UNITS CAPS Take 2,000 Units by mouth daily. nitroGLYCERIN (NITROSTAT) 0.4 MG SL tablet Place 0.4 mg under the tongue every 5 minutes as needed. Subdural Hematoma: Care Instructions  Your Care Instructions  A subdural hematoma is a buildup of blood between the layers of tissue that cover the brain. The blood collects under the layer closest to the skull. (This layer is called the dura.) The bleeding is most often caused by a head injury, but there can be other causes. In an older adult, even a minor injury can lead to a subdural hematoma. The buildup of blood inside the skull can put pressure on the brain. This may cause symptoms, such as a severe headache, confusion, or seizures. There are two kinds of hematomas: acute and chronic. · With an acute hematoma, symptoms start soon after the injury. · With a chronic hematoma, it may be days or weeks before symptoms appear. Doctors use imaging tests to find the buildup of blood. You may have a test such as a CT scan or MRI. The doctor may also do a test to check the pressure inside your skull.   Bleeding inside the skull may get worse over time. So it is very important to pay attention to your symptoms. And be sure to see your doctor for follow-up testing. In some cases, treatment is needed to remove the blood. This helps relieve the pressure on the brain. Your doctor may make one or two small holes in your skull. For a large hematoma, the doctor may need to remove a piece of the skull. You may not need treatment if you have a small hematoma that is not causing symptoms. If you take aspirin or some other blood thinner, you may need to stop taking it. The doctor may give you treatment to undo the effects of the blood thinner. This can help prevent more bleeding in the skull. The doctor has checked you carefully, but problems can develop later. If you notice any problems or new symptoms, get medical treatment right away. Follow-up care is a key part of your treatment and safety. Be sure to make and go to all appointments, and call your doctor if you are having problems. It's also a good idea to know your test results and keep a list of the medicines you take. How can you care for yourself at home? For an acute hematoma  · Follow your doctor's instructions. He or she will tell you if you need someone to watch you closely for the next 24 hours or longer. For a chronic hematoma  · Get plenty of sleep at night, and take it easy during the day. Rest is the best way to recover. · Avoid activities that are physically or mentally demanding. These include housework, exercise, paperwork, video games, text messaging, and using the computer. You may need to change your work or school schedule for a while. · Return to your normal activities slowly. Do not try to do too much at once. For either type of hematoma  · Do not drink alcohol until your doctor says it is okay. · Don't drive a car, ride a bike, or operate machinery until your doctor says it's okay. · If you take aspirin or some other blood thinner, be sure to talk to your doctor.  He or she will tell you if and when to start taking this medicine again. Make sure that you understand exactly what your doctor wants you to do. · If you normally take medicine, your doctor will tell you if and when you can restart it. He or she will also give you instructions about taking any new medicines. When should you call for help? Call 911 anytime you think you may need emergency care. For example, call if:    · You have a seizure.     · You passed out (lost consciousness).     · You are confused or can't stay awake. Call your doctor now or seek immediate medical care if:    · You have new or worse vomiting.     · You feel less alert.     · You have new weakness or numbness in any part of your body.     · You have a headache that is getting worse. Watch closely for changes in your health, and be sure to contact your doctor if:    · You do not get better as expected.     · You have new symptoms, such as headaches, trouble concentrating, or changes in mood. Where can you learn more? Go to [email or web address]. org and sign in to your [employer] account. Enter S080 in the [employer] box to learn more about \"Subdural Hematoma: Care Instructions. \"     If you do not have an account, please click on the \"Sign Up Now\" link. Current as of: August 4, 2020               Content Version: 12.9  © 6787-1491 [employer]. Care instructions adapted under license by [facility] ([facility]). If you have questions about a medical condition or this instruction, always ask your healthcare professional. [place] [address] any warranty or liability for your use of this information.          SPECIAL CONSIDERATIONS FOR OUR PATIENTS OVER THE AGE OF 65Y    Getting around your home safely can be a challenge if you have injuries or health problems that make it easy for you to fall.  Loose rugs and furniture in walkways are among the dangers for many older people who have problems walking or who have poor eyesight. People who have conditions such as arthritis, osteoporosis, or dementia also must be careful not to fall. You can make your home safer with a few simple measures. Follow-up care is a key part of your treatment and safety. Be sure to make and go to all appointments, and call your doctor or nurse call line if you are having problems. It's also a good idea to know your test results and keep a list of the medicines you take. How can you care for yourself at home? Taking care of yourself   You may get dizzy if you do not drink enough water. To prevent dehydration, drink plenty of fluids, enough so that your urine is light yellow or clear like water. Choose water and other caffeine-free clear liquids. If you have kidney, heart, or liver disease and have to limit fluids, talk with your doctor before you increase the amount of fluids you drink.  Exercise regularly to improve your strength, muscle tone, and balance. Walk if you can. Swimming may be a good choice if you cannot walk easily.  Have your vision and hearing checked each year or any time you notice a change. If you have trouble seeing and hearing, you might not be able to avoid objects and could lose your balance.  Know the side effects of the medicines you take. Ask your doctor or pharmacist whether the medicines you take can affect your balance. Sleeping pills or sedatives can affect your balance.  Limit the amount of alcohol you drink. Alcohol can impair your balance and other senses.  Ask your doctor whether calluses or corns on your feet need to be removed. If you wear loose-fitting shoes because of calluses or corns, you can lose your balance and fall.  Talk to your doctor if you have numbness in your feet. Preventing falls at home   Remove raised doorway thresholds, throw rugs, and clutter. Repair loose carpet or raised areas in the floor.   [facility] furniture and electrical cords to keep them out of walking your washroom door unlocked while you are in the shower. RIB FRACTURE DISCHARGE INSTRUCTIONS    Broken Rib: Care Instructions  Your Care Instructions     A broken rib is a crack or break in one of the bones of the rib cage. Breathing can be very painful because the muscles used for breathing pull on the rib. In most cases, a broken rib will heal on its own. You can take pain medicine while the rib mends. Pain relief allows you to take deep breaths. In the past, doctors recommended taping or wrapping broken ribs. This is no longer done because taping makes it hard for you to take deep breaths. Taking deep breaths may help prevent pneumonia or a partial collapse of a lung. Your rib will heal in about 6 weeks. You heal best when you take good care of yourself. Eat a variety of healthy foods, and don't smoke. Follow-up care is a key part of your treatment and safety. Be sure to make and go to all appointments, and call your doctor if you are having problems. It's also a good idea to know your test results and keep a list of the medicines you take. How can you care for yourself at home? · Be safe with medicines. Read and follow all instructions on the label. ? If the doctor gave you a prescription medicine for pain, take it as prescribed. ? If you are not taking a prescription pain medicine, ask your doctor if you can take an over-the-counter medicine. · Even if it hurts, try to cough or take the deepest breath you can at least once every hour. This will get air deeply into your lungs. This may reduce your chance of getting pneumonia or a partial collapse of a lung. Hold a pillow against your chest to make this less painful. · Put ice or a cold pack on the area for 10 to 20 minutes at a time. Put a thin cloth between the ice and your skin. When should you call for help? Call 911 anytime you think you may need emergency care. For example, call if:    · You have severe trouble breathing.    Call your doctor now or seek immediate medical care if:    · You have some trouble breathing.     · You have a fever.     · You have a new or worse cough. Watch closely for changes in your health, and be sure to contact your doctor if:    · You have pain even after taking your medicine.     · You do not get better as expected. Where can you learn more? Go to https://chpepiceweb.viaForensics. org and sign in to your League[employer] account. Enter M135 in the cityguru box to learn more about \"Broken Rib: Care Instructions. \"     If you do not have an account, please click on the \"Sign Up Now\" link. Current as of: November 16, 2020               Content Version: 12.9  © 2006-2021 [employer]. Care instructions adapted under license by [facility] ([facility]). If you have questions about a medical condition or this instruction, always ask your healthcare professional. [address] any warranty or liability for your use of this information.         Your ribs are curved bones in your chest that protect inner structures like your lungs and heart. The ribs expand and contract when you breathe. Pain can result making it hard to cough or breathe deeply. The difficulty increases if several ribs are broken on both sides. You are likely to heal in 6 to 8 weeks, but may take longer if you are elderly. Most rib fractures heal on their own with no lasting effects. Treatment:    Take the medications given to you as prescribed. Your pain may not go completely away after discharge from the hospital, but we want your pain tolerable so you can take deep breaths.  As your pain becomes controlled you may switch to taking over-the-counter medications such as Tylenol and or Ibuprofen as directed. o Tylenol (acetaminophen) 1000mg every 6 hours or you may take 650mg every 4 hours. o Ibuprofen up to 800mg every 8 hours. (Please take with food or milk).      Over the counter creams and patches such as [employer], Josefina, can be useful as well.  You were likely given a breathing device called an incentive spirometer while in the hospital to practice deep breathing. It is advised to continue this several times a day while at home to prevent pneumonia. Prevent Complications:  Try to take 5-10 deep breaths every hour while awake. Use the incentive spirometer often. Set goals of deeper breathing every couple of days until reaching normal adult level of about 2500 ml on the printed scale. Activity:  Avoid further chest injury. Plan quiet activity for the first few days. Do not stay in bed. Walk around and move. No rough activities with family, friends or pets. No sports, jumping, jogging or gymnastics. Ask your doctor or the trauma clinic when you will be able to resume these activities. Symptoms to Report:  Call your doctor right away if you notice any of these symptoms:    Increased chest pain   Shortness of breath   Fever   Coughing up blood    Call 911 immediately if these symptoms are severe. Follow-up:  Trauma Clinic: [phone] option 2  [place], [address]             Follow up: Salazar Rodriguez MD  [address] [place] [address]. [id number]  [phone]    Schedule an appointment as soon as possible for a visit      Omero Rodriguez MD  [facility]  [phone]    Schedule an appointment as soon as possible for a visit in 2 weeks      Odilon Watts, [address] [facility] [phone]    Schedule an appointment as soon as possible for a visit in 2 weeks         Signed:   Elroy Smith DO  7/28/2021  10:48 AM

## 2021-07-27 NOTE — PROGRESS NOTES
Physical Therapy  Physical Therapy Treatment Note     Name: Megan Marks  : 1940  MRN: [id number]      Date of Service: 2021    Evaluating PT:  Clarenceeduardo Ellison, PT, DPT [id number]    Room #:  5927/7325-E  Diagnosis:  Fall from standing, initial encounter [W19. XXXA]  PMHx/PSHx:  Alzheimer's disease. CAD, HLD, HTN, MI, Shingles  Procedure/Surgery:  None  Precautions:  Falls, O2, bed alarm, R rib 5-9 fxs  Equipment Needs:  [employer] - pt already owns    SUBJECTIVE:    Pt lives at [facility]. Pt ambulated with [employer] PTA. OBJECTIVE:   Initial Evaluation  Date: 21 Treatment  2021 Short Term/ Long Term   Goals   AM-PAC 6 Clicks 96/58     Was pt agreeable to Eval/treatment? Yes Yes     Does pt have pain?  No c/o pain None     Bed Mobility  Rolling: NT  Supine to sit: Andria with HOB elevated  Sit to supine: NT  Scooting: Andria Sit to supine min A  Supine to sit min A   Scooting min A SBA   Transfers Sit to stand: Andria  Stand to sit: Andria  Stand pivot: Andria with [employer] Sit to stand min A  Stand to sit min A  Stand pivot with ww with min A SBA with [employer]   Ambulation   75 feet x 2 reps with Andria with [employer] 75 feet x2 with ww with min A >150 feet with SBA with [employer]   Stair negotiation: ascended and descended NA NT    ROM BUE:  Defer to OT note  BLE:  WFL     Strength BUE:  Defer to OT note  BLE:  4/5  Increase by 1/3 MMT grade   Balance Sitting EOB:  Andria  Dynamic Standing:  Andria with [employer]  Sitting EOB:  Independent  Dynamic Standing:  SBA with [employer]       Patient education  Pt educated on safety    Patient response to education:   Pt verbalized understanding Pt demonstrated skill Pt requires further education in this area   partially Partially with verbal cues and assistance x     ASSESSMENT:    Conditions Requiring Skilled Therapeutic Intervention:    [x]Decreased strength     []Decreased ROM  [x]Decreased functional mobility  [x]Decreased balance   [x]Decreased endurance   [x]Decreased posture  []Decreased sensation  []Decreased coordination   []Decreased vision  [x]Decreased safety awareness   []Increased pain       Comments:  Nursing cleared pt for physical therapy. Pt sleeping  bed upon arrival and easily awakened/agreed to participate in therapy. Pt with confusion about where he is at and why is he at hospital.   Pt on 2 liters of O2 throughout treatment. Pt completed functional mobility as noted above. Decreased safety noted throughout treatment . Static standing at walker for clothing management required min A. Pt ambulated with ww and required verbal cues to slow radha to improve safety and balance. Occasional assistance required with walker management. Pt returned to bed pre nursing request with call light in reach. Treatment:  Patient practiced and was instructed in the following treatment:     Bed mobility training -Verbal instruction for technique with bed mobility. Assistance required to complete task.  Transfer training - verbal instruction for hand placement and technique to improve safety and balance. Assistance required to complete task.  Gait training- Verbal instruction for walker approximation to improve safety and balance. Assistance required to complete task. Pt's/ family goals   1. Return home    Prognosis is good for reaching above PT goals. Patient and or family understand(s) diagnosis, prognosis, and plan of care. yes    PHYSICAL THERAPY PLAN OF CARE:    PT POC is established based on physician order and patient diagnosis     Referring provider/PT Order:  Aretha Esteban MD /07/25/21 1915 PT eval and treat  Diagnosis:  Fall from standing, initial encounter [W19. XXXA]  Specific instructions for next treatment:  Progress activity    Current Treatment Recommendations:     [x] Strengthening to improve independence with functional mobility   [] ROM to improve independence with functional mobility   [x] Balance Training to improve static/dynamic balance and to reduce fall risk  [x] Endurance Training to improve activity tolerance during functional mobility   [x] Transfer Training to improve safety and independence with all functional transfers   [x] Gait Training to improve gait mechanics, endurance and asses need for appropriate assistive device  [x] Stair Training in preparation for safe discharge home and/or into the community   [] Positioning to prevent skin breakdown and contractures  [x] Safety and Education Training   [x] Patient/Caregiver Education   [] HEP  [] Other     PT long term treatment goals are located in above grid    Frequency of treatments: 2-5x/week x 1-2 weeks. Time in 1320  Time out  1345    Total Treatment Time  25 minutes     Evaluation Time includes thorough review of current medical information, gathering information on past medical history/social history and prior level of function, completion of standardized testing/informal observation of tasks, assessment of data and education on plan of care and goals.     CPT codes:  [] Low Complexity PT evaluation 84784  [] Moderate Complexity PT evaluation 13384  [] High Complexity PT evaluation 19499  [] PT Re-evaluation 04832  [] Gait training 99089 - minutes  [] Manual therapy 75188 - minutes  [x] Therapeutic activities 40700 25 minutes  [] Therapeutic exercises 87201 - minutes  [] Neuromuscular reeducation 24791 - minutes     Koko Ziegler [id number]

## 2021-07-27 NOTE — PROGRESS NOTES
[facility]  ATTENDING PHYSICIAN PROGRESS NOTE     I have examined the patient and  reviewed the record. I have reviewed all relevant labs and imaging data. The following summarizes my clinical findings and independent assessment. CC: fall    S. Patient is transferred out of SICU overnight. He is breathing comfortably. He answers questions. But slow to respond    O.  GCS 14, baseline confused  Follows commands  Heart rate is paced  Breathing comfortably no distress, minimal chest wall pain  Strength 5 out of 5 in upper and lower extremities  Abdomen soft nontender nondistended    ASSESSMENT:  Active Problems:    Fall    SDH (subdural hematoma) (HCC)    SAH (subarachnoid hemorrhage) (HCC)    Fracture of multiple ribs of right side    Supratherapeutic INR    Facial contusion  Resolved Problems:    * No resolved hospital problems. *       PLAN:  Subdural/subarachnoid hemorrhageneuro intact repeat head CT scan is stable. No anticoagulation per neurosurgery. Patient was on Coumadin which was reversed with IV vitamin K and Kcentra. Appreciate neurosurgery input    Multiple right-sided rib fracturesaggressive pain control pulmonary toilet    General diet    Alzheimer's diseasethis accounts for his baseline confusion    Right-sided kidney stone with hydronephrosiswe will consult urology    DVT Proph: SCDS/heparin 3 times daily    PT OT  AMPAC of 16  May need placement with a baseline Alzheimer's       Talya Torres MD, FACS  7/27/2021  1:20 PM    NOTE: This report was transcribed using voice recognition software. Every effort was made to ensure accuracy; however, inadvertent computerized transcription errors may be present.

## 2021-07-27 NOTE — CARE COORDINATION
Received call back from Wife - [address]. Pt lives with Wife at [facility] [address]. Pt uses a walker at home. PCP: Dr. Sha Smith. Pharmacy: [facility]. Wife/Family will transport at discharge. Wife will speak with D-I-L about [address] versus OSMIN if needed at discharge. Wife would choose [facility] for OSMIN. SW/ CM to follow for discharge needs.    Sabiha Waggoner, L.S.W.  [phone]

## 2021-07-27 NOTE — CONSULTS
7/27/2021 4:35 PM  Service: Urology  Group: [facility] (Joseph/Aaliyah/Shannan)    Kait Bryant  [id number]     Chief Complaint:    1-2 mm right UVJ calculi with mild bilateral hydronephrosis     History of Present Illness: The patient is a 79 y.o. male patient who presented to the hospital 2 days ago after suffering from a fall while using his walker. We were asked to evaluate him for findings of a 1 to 2 mm stone in the right UVJ with mild bilateral hydronephrosis. When asked he denies any flank pain. His serum creatinine is currently stable. He has no leukocytosis. No documented fevers. He has a history of Alzheimer's and is a poor historian. There is no family present currently. Past Medical History:   Diagnosis Date    Acute myocardial infarction, unspecified site 1995    Myocardial Infarction    Alzheimer's disease ([address]     Anemia associated with acute blood loss 4/11/2013    BPH associated with nocturia 4/9/2013    CAD (coronary artery disease), native coronary artery 4/9/2013    DVT, lower extremity, distal ([address] 4/9/2013    HTN, goal below 130/80 4/9/2013    Hx of blood clots     Hyperlipidemia LDL goal < 70 4/9/2013    Pulmonary emboli ([address] 4/9/2013    Retroperitoneal hematoma 4/11/2013    Shingles 2008         Past Surgical History:   Procedure Laterality Date    BACK SURGERY      CARDIAC DEFIBRILLATOR PLACEMENT      CARDIAC SURGERY      COLONOSCOPY  2010    CORONARY ARTERY BYPASS GRAFT      ECHO COMPL W DOP COLOR FLOW  4/8/2013         ECHO COMPL W DOP COLOR FLOW  4/15/2013         ECHO COMPL W DOP COLOR FLOW  12/6/2013         ENDOSCOPY, COLON, DIAGNOSTIC  2006    HERNIA REPAIR      HERNIA REPAIR  1980    PACEMAKER INSERTION      PACEMAKER PLACEMENT      defibulator no pacemaker    UPPER GASTROINTESTINAL ENDOSCOPY  12/6/13    DR. TO       Medications Prior to Admission:    Medications Prior to Admission: warfarin (COUMADIN) 2.5 MG tablet, Take 2.5 mg by mouth Tuesday, Thursday, Saturday, Sunday  warfarin (COUMADIN) 5 MG tablet, Take 5 mg by mouth Monday, Wednesday, and Friday  lisinopril (PRINIVIL;ZESTRIL) 5 MG tablet, TAKE 1 TABLET BY MOUTH  DAILY  labetalol (NORMODYNE) 100 MG tablet, TAKE 1 TABLET BY MOUTH  TWICE DAILY  memantine ER (NAMENDA XR) 28 MG CP24 extended release capsule, TAKE 1 CAPSULE BY MOUTH  DAILY  rosuvastatin (CRESTOR) 10 MG tablet, TAKE 1 TABLET BY MOUTH  DAILY  furosemide (LASIX) 20 MG tablet, Take one pill every other day  rivastigmine (EXELON) 3 MG capsule, Take 1 capsule by mouth 2 times daily  amLODIPine (NORVASC) 5 MG tablet, TAKE 1 TABLET BY MOUTH  DAILY  ranolazine (RANEXA) 500 MG extended release tablet, TAKE 1 TABLET BY MOUTH  DAILY  omeprazole (PRILOSEC) 40 MG delayed release capsule, TAKE 1 CAPSULE BY MOUTH IN  THE MORNING  vitamin B-12 (CYANOCOBALAMIN) 1000 MCG tablet, Take 1,000 mcg by mouth daily  finasteride (PROSCAR) 5 MG tablet, Take 5 mg by mouth three times a week On Monday, Wednesday, and Friday  tamsulosin (FLOMAX) 0.4 MG capsule, Take 0.4 mg by mouth daily  magnesium hydroxide (MILK OF MAGNESIA) 400 MG/5ML suspension, Take by mouth daily as needed for Constipation  acetaminophen (TYLENOL) 500 MG tablet, Take 500 mg by mouth as needed for Pain \"Seldom takes\". Cholecalciferol (VITAMIN D3) 2000 UNITS CAPS, Take 2,000 Units by mouth daily. nitroGLYCERIN (NITROSTAT) 0.4 MG SL tablet, Place 0.4 mg under the tongue every 5 minutes as needed. Allergies:    Heparin, Tape [adhesive tape], and Tape [adhesive tape]    Social History:    reports that he quit smoking about 41 years ago. His smoking use included cigarettes. He started smoking about 61 years ago. He has a 20.00 pack-year smoking history. He has never used smokeless tobacco. He reports that he does not drink alcohol and does not use drugs. Family History:   Non-contributory to this Urological problem  family history is not on file.     Review of Systems:  Unable to obtain due to confusion    Physical Exam:     Vitals:  /78   Pulse 80   Temp 98.1 °F (36.7 °C) (Axillary)   Resp 16   Ht 5' 8\" (1.727 m)   Wt 163 lb (73.9 kg)   SpO2 93%   BMI 24.78 kg/m²     General: Awake and alert, confused, no acute distress  HEENT:  Normocephalic, atraumatic. Right eye ecchymotic  Lungs:  Respirations symmetric and non-labored. Abdomen:  soft, nontender, no masses  Extremities:  No clubbing, cyanosis, or edema  Skin:  Warm and dry, no open lesions or rashes  Neuro: There are no motor or sensory deficits in the 4 quadrant extremities   Rectal: deferred  Genitourinary:  No williamson     Labs:     Recent Labs     07/25/21  1306 07/26/21  0510   WBC 7.7 8.1   RBC 3.93 3.99   HGB 12.2* 12.4*   HCT 38.4 38.8   MCV 97.7 97.2   MCH 31.0 31.1   MCHC 31.8* 32.0   RDW 14.7 14.6    153   MPV 9.5 9.0         Recent Labs     07/25/21  1306 07/26/21  0510   CREATININE 1.0 0.8       Lab Results   Component Value Date    PSA 2.88 10/07/2020    PSA 3.06 10/23/2019    PSA 5.88 (H) 10/15/2018       Imaging:   Narrative   EXAMINATION:   CT OF THE ABDOMEN AND PELVIS WITH CONTRAST 7/25/2021 8:40 pm       TECHNIQUE:   CT of the abdomen and pelvis was performed with the administration of   intravenous contrast. Multiplanar reformatted images are provided for review. Dose modulation, iterative reconstruction, and/or weight based adjustment of   the mA/kV was utilized to reduce the radiation dose to as low as reasonably   achievable.       COMPARISON:   12/05/2019       HISTORY:   ORDERING SYSTEM PROVIDED HISTORY: fall from standing   TECHNOLOGIST PROVIDED HISTORY:   Reason for exam:->fall from standing   Additional Contrast?->None   What reading provider will be dictating this exam?->CRC       FINDINGS:   There is some streak artifact due to overlying external densities.    Gallbladder is unremarkable. Lora Greening is homogeneous.  Spleen is normal in   size.  Patient motion artifact also limits assessment.  Extensive diffuse   fatty replacement of the pancreas.  No adrenal mass.  1-2 mm stone at the   right ureterovesicular junction with minimal hydronephrosis.  Duplicated left   renal collecting system also demonstrating mild hydronephrosis.  No left   ureteral stone and the left-sided hydronephrosis could be related to the   moderately distended urinary bladder.  This could be assessed with retrograde   evaluation if indicated.  Nonobstructive bilateral nephrolithiasis, measuring   3 mm on the right and 3 mm on the left.  Bilateral low-attenuation renal   lesions likely representing multiple cysts.  1 of these exophytic from the   posterior upper pole on the left is more dense than expected for a simple   cyst but is unchanged and likely represents a complex or hemorrhagic cyst   measuring 10 mm.  Inferior vena caval filter.  Scattered vascular   calcifications.       Assessment of bowel is limited without oral contrast.  Moderate hiatal   hernia.  No bowel obstruction.  Moderate stool burden.  Mild diverticulosis   without evidence of acute diverticulitis.  No free fluid, free air or   localized fluid collection.  Heterogeneous mild prominence of the prostate   gland.       Trace right pleural effusion.  Moderate to severe cardiomegaly.  Partial   visualization of cardiac pacemaker leads.  Airspace opacities involving the   right greater than left lung base are consistent with areas of atelectasis   although developing infiltrates from pneumonia are not excluded.  Numerous   old bilateral rib fractures.  Acute fractures of the lateral 5th through 9th   ribs on the right partially included in the field of view.  Degenerative   changes are present in the spine and pelvis.  Old fractures of the left   superior and inferior pubic rami.  Mild compression deformities in the lower   thoracic spine and in the upper to mid lumbar spine also favored to be old.           Impression   Acute lower lateral right rib fractures as seen on recent chest CT.       Multiple old fractures otherwise.       Mild right-sided hydronephrosis with a 1-2 mm right ureterovesicular junction   stone.       Minimal left-sided hydronephrosis without identified stone.  This could be   related to the distended urinary bladder.       Moderate hiatal hernia.       Parenchymal opacities involving the lung bases as seen on the recent chest CT.       Other chronic appearing findings.                       Assessment/plan:  Mild bilateral hydronephrosis  1 -2 mm right UVJ stone   Duplicate left renal collecting system       CT abdomen pelvis reviewed shows questionable 1 to 2 mm right UVJ calculi with mild bilateral hydronephrosis and a duplicated left renal collecting system. Bladder appears to be distended on examination which could be contributing to his bilateral hydronephrosis. His creatinine is currently stable  He has no leukocytosis  Hold on acute  intervention such as stenting at this time  Check a PVR  Continue the Flomax  Continue the Proscar  We will continue to follow        Electronically signed by AGUSTIN Alonso CNP on 7/27/2021 at 4:35 PM         The patient was seen and examined. I have reviewed the medical record in detail. I agree with the plan as outlined by KHALIDA Alonso.     Maria Guadalupe Sargent MD  7:06 PM  7/27/2021

## 2021-07-27 NOTE — TELEPHONE ENCOUNTER
Courtesy call to Dr Ozzy Alfonso from [employer] [facility] with Trauma Services -- pt is admitted, room 4506 after a fall. Discussed with Dr Ozzy Alfonso. Stated he is aware & has seen the pt.

## 2021-07-27 NOTE — PLAN OF CARE
Problem: Skin Integrity:  Goal: Will show no infection signs and symptoms  Description: Will show no infection signs and symptoms  Outcome: Met This Shift  Goal: Absence of new skin breakdown  Description: Absence of new skin breakdown  Outcome: Met This Shift     Problem: Falls - Risk of:  Goal: Will remain free from falls  Description: Will remain free from falls  Outcome: Met This Shift  Goal: Absence of physical injury  Description: Absence of physical injury  Outcome: Met This Shift     Problem: Pain:  Goal: Control of acute pain  Description: Control of acute pain  Outcome: Met This Shift

## 2021-07-28 NOTE — CARE COORDINATION
[address] [place] [address] can accept Pt tomorrow 7/29. Scheduled Physicians for  at noon for 07/29 for [facility]. Notified D-I-MADDI Keita that Pt  Will be picked around 11am for discharge to [facility].    Charly Funes, L.S.W.  [phone]

## 2021-07-28 NOTE — PROGRESS NOTES
demonstrated skill Pt requires further education in this area   partial partial x     ASSESSMENT:    Conditions Requiring Skilled Therapeutic Intervention:    [x]Decreased strength     []Decreased ROM  [x]Decreased functional mobility  [x]Decreased balance   [x]Decreased endurance   [x]Decreased posture  []Decreased sensation  []Decreased coordination   []Decreased vision  [x]Decreased safety awareness   []Increased pain       Comments:  Pt received supine in bed. Able to bring himself to EOB with some assistance. Pt stand from bed impulsively requiring assistance for balance and safety. Pt able to ambulate in hallway using FWW with unsteady gait and requiring verbal cues for FWW approximation/negotiation. Pt limited by endurance. Required standing rest during gait. SOB noted. SpO2 WNL. Ambulated back to room and sat on EOB. Performed STS exercise. Transferred to chair and placed chair alarm. Family present on exit. At this point, pt is a high fall risk and would benefit from continued PT services at d/c. Had lengthy discussion with pt's DIL. Pt has had increasing safety concerns at the independent living with his wife. He has had more falls lately. Currently his strength and balance are not at his baseline and with his history of poor safety, poor judgement, and impulsivity he would greatly benefit from rehab prior to d/c home. Pt has good rehab potential and has much room for improvement which would improve his functional mobility, independence, safety, decrease risk of falls/re-admission, and decrease caregiver burden on his elderly wife.       Treatment:  Patient practiced and was instructed in the following treatment:    · Bed mobility training - pt given verbal and tactile cues to facilitate proper sequencing and safety during rolling and supine>sit as well as provided with physical assistance to complete task   · STS and pivot transfer training - pt educated on proper hand and foot placement, safety and sequencing, and use of FWW to safely complete sit<>stand and pivot transfers with hands on assistance to complete task safely   · Gait training- pt was given verbal and tactile cues to facilitate safety/balance, FWW approximation/negotiation, upright posture during ambulation as well as provided with physical assistance to complete task. · Functional strengthening - 2 x 5 STS exercise     PHYSICAL THERAPY PLAN OF CARE:    Pt is making fair progress towards established goals. Continue PT POC.      Specific instructions for next treatment:  Progress activity, safety training, fall prevention     Time in  1105  Time out  1130    Total Treatment Time  25 minutes     CPT codes:  [] Low Complexity PT evaluation 45174  [] Moderate Complexity PT evaluation 30245  [] High Complexity PT evaluation 72404  [] PT Re-evaluation 58673  [] Gait training 77206 - minutes  [] Manual therapy 91714 - minutes  [x] Therapeutic activities 98833 25 minutes  [] Therapeutic exercises 58673 - minutes  [] Neuromuscular reeducation 19725 - minutes     Gogo Darden, SPT  Mialna Wong, PT, DPT  [id number]

## 2021-07-28 NOTE — PLAN OF CARE
Problem: Skin Integrity:  Goal: Absence of new skin breakdown  Description: Absence of new skin breakdown  Outcome: Met This Shift     Problem: Falls - Risk of:  Goal: Will remain free from falls  Description: Will remain free from falls  Outcome: Met This Shift     Problem: Falls - Risk of:  Goal: Absence of physical injury  Description: Absence of physical injury  Outcome: Met This Shift     Problem: Pain:  Goal: Control of acute pain  Description: Control of acute pain  Outcome: Met This Shift     Problem: Musculor/Skeletal Functional Status  Goal: Absence of falls  Outcome: Met This Shift

## 2021-07-28 NOTE — DISCHARGE INSTR - COC
(Boostrix, Adacel) 12/19/2017    Tetanus 02/17/2010, 02/17/2010    Zoster Live (Zostavax) 02/19/2009       Active Problems:  Patient Active Problem List   Diagnosis Code    Alzheimer's disease ([facility] G30.9, F02.80    Hyperlipidemia E78.5    Syncope R55    Abrasion- posterior scalp  T14. 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    Acute respiratory failure with hypoxemia ([facility]) J96.01    Shortness of breath R06.02    Multiple rib fractures S22.49XA    Hypoxia R09.02    Acute respiratory failure with hypoxia ([facility]) J96.01    Acute midline low back pain without sciatica M54.5    Deep venous thrombosis ([facility]) I82.409    Tinea unguium B35.1    Peripheral vascular disease, unspecified ([facility]) I73.9    Pain in toe of right foot M79.674    Pain in left toe(s) M79.675    Difficulty walking R26.2    Diverticulitis K57.92    Diverticulitis of colon K57.32    Fall W19. Armand     SDH (subdural hematoma) ([facility] S06.5X9A    SAH (subarachnoid hemorrhage) ([facility]) I60.9    Fracture of multiple ribs of right side S22.41XA    Supratherapeutic INR R79.1    Facial contusion S00.83XA       Isolation/Infection:   Isolation            No Isolation          Patient Infection Status       None to display            Nurse Assessment:  Last Vital Signs: /82   Pulse 78   Temp 97.8 °F (36.6 °C)   Resp 18   Ht 5' 8\" (1.727 m)   Wt 163 lb (73.9 kg)   SpO2 94%   BMI 24.78 kg/m²     Last documented pain score (0-10 scale): Pain Level: 0  Last Weight:   Wt Readings from Last 1 Encounters:   07/25/21 163 lb (73.9 kg)     Mental Status:  disoriented and alert    IV Access:  - None    Nursing Mobility/ADLs:  Walking   Assisted  Transfer  Assisted  Bathing  Assisted  Dressing  Assisted  Toileting  Assisted  Feeding  Independent  Med Admin  Assisted  Med Delivery   whole    Wound Care Documentation and Therapy:  Wound 07/25/21 Shoulder Left (Active)   Wound Etiology Traumatic 07/27/21 0240   Dressing Status Clean;Dry; Intact 07/28/21 0827   Wound Cleansed Cleansed with saline 07/27/21 0240   Dressing/Treatment Petroleum gauze;Dry dressing 07/27/21 0240   Wound Length (cm) 5 cm 07/28/21 0827   Wound Width (cm) 3 cm 07/28/21 0827   Wound Depth (cm) 0.1 cm 07/28/21 0827   Wound Surface Area (cm^2) 15 cm^2 07/28/21 0827   Change in Wound Size % (l*w) 0 07/28/21 0827   Wound Volume (cm^3) 1.5 cm^3 07/28/21 0827   Wound Healing % 0 07/28/21 0827   Wound Assessment Pink/red 07/28/21 0827   Drainage Amount None 07/28/21 0827   Drainage Description Sanguinous 07/27/21 0240   Odor None 07/28/21 0827   Mariann-wound Assessment Ecchymosis 07/28/21 0827   Number of days: 3       Wound 07/25/21 Arm Left;Lateral (Active)   Wound Etiology Traumatic 07/27/21 0240   Dressing Status Clean;Dry; Intact 07/28/21 0007   Wound Cleansed Cleansed with saline 07/27/21 0240   Dressing/Treatment Other (comment) 07/27/21 0240   Wound Length (cm) 0.1 cm 07/27/21 0240   Wound Width (cm) 1 cm 07/27/21 0240   Wound Depth (cm) 0.1 cm 07/27/21 0240   Wound Surface Area (cm^2) 0.1 cm^2 07/27/21 0240   Wound Volume (cm^3) 0.01 cm^3 07/27/21 0240   Wound Assessment Pink/red;Dry 07/27/21 0240   Drainage Amount None 07/27/21 0240   Odor None 07/27/21 0240   Mariann-wound Assessment Fragile;Ecchymosis 07/27/21 0240   Number of days: 3        Elimination:  Continence:   · Bowel:  Yes  · Bladder: Yes  Urinary Catheter: None   Colostomy/Ileostomy/Ileal Conduit: No       Date of Last BM: 7/28      Intake/Output Summary (Last 24 hours) at 7/28/2021 1032  Last data filed at 7/28/2021 0956  Gross per 24 hour   Intake 60 ml   Output 800 ml   Net -740 ml     I/O last 3 completed shifts:  In: -   Out: 800 [Urine:800]    Safety Concerns:     Lilibeth LEONARDO Safety Concerns:[id number]:::0}    Impairments/Disabilities:      None    Nutrition Therapy:  Current Nutrition Therapy:   - Oral Diet:  General    Routes of Feeding: Oral  Liquids: Thin Liquids  Daily Fluid Restriction: no  Last Modified Barium Swallow with Video (Video Swallowing Test): not done    Treatments at the Time of Hospital Discharge:   Respiratory Treatments:   Oxygen Therapy:  is not on home oxygen therapy. Ventilator:    - No ventilator support    Rehab Therapies: Physical Therapy and Occupational Therapy  Weight Bearing Status/Restrictions: No weight bearing restirctions  Other Medical Equipment (for information only, NOT a DME order):     Other Treatments:   Patient's personal belongings (please select all that are sent with patient):  None    RN SIGNATURE:  {Esignature:[id number]:::0}    CASE MANAGEMENT/SOCIAL WORK SECTION    Inpatient Status Date: ***    Readmission Risk Assessment Score:  Readmission Risk              Risk of Unplanned Readmission:  13           Discharging to Facility/ Agency   · Name:   · Address:  · Phone:  · Fax:    Dialysis Facility (if applicable)   · Name:  · Address:  · Dialysis Schedule:  · Phone:  · Fax:    / signature: {Esignature:[id number]:::0}    PHYSICIAN SECTION    Prognosis: {Prognosis:0168457466:::0}    Condition at Discharge: Lilibeth Brown Patient Condition:992914750:::0}    Rehab Potential (if transferring to Rehab): {Prognosis:7700484884:::0}    Recommended Labs or Other Treatments After Discharge: ***    Physician Certification: I certify the above information and transfer of Megan Marks  is necessary for the continuing treatment of the diagnosis listed and that he requires {Admit to Appropriate Level of Care:81406:::0} for {GREATER/LESS:098195464} 30 days.      Update Admission H&P: {CHP DME Changes in HandP:877438383:::0}    PHYSICIAN SIGNATURE:  Electronically signed by Elroy Smith DO on 7/28/21 at 10:32 AM EDT

## 2021-07-28 NOTE — CARE COORDINATION
Spoke with DOMINGO Gupta about Transition plan of Care. Family is thinking about Long term care after [facility] stay. Family choose [facility]. Referral made to [address]. Morgan Renner has no open beds at [facility] but has bed at [facility]. .  Checked with DOMINGO Gupta about [facility]. Alonso spoke with Pt's Wife and decided that Pt will go to long term care after [facility] stay. Family checking for LTC beds at other facilities. Family also would consider [facility]. Referral made to [facility]. [facility] will review for skilled to LTC also. Private pay at [facility] $283 per day. Notified DOMINGO Gupta of the cost and availability  Of chosen facilities. DOMINGO spoke with Wife and Son. Family chose [facility]. Notified Sachin Andrade of Family's choice. Completed [facility] Exemption, Envelope, and Ambulance Form on soft chart. Discharge Plan is to [facility] where medically stable.    Justin Gorman, L.S.W.  [phone]

## 2021-07-29 NOTE — PLAN OF CARE
Problem: Skin Integrity:  Goal: Will show no infection signs and symptoms  Description: Will show no infection signs and symptoms  7/28/2021 2352 by [facility]  Outcome: Met This Shift  7/28/2021 2213 by [facility]  Outcome: Met This Shift  Goal: Absence of new skin breakdown  Description: Absence of new skin breakdown  7/28/2021 2352 by [facility]  Outcome: Met This Shift  7/28/2021 2213 by [facility]  Outcome: Met This Shift  7/28/2021 1725 by Manny Handley RN  Outcome: Met This Shift     Problem: Falls - Risk of:  Goal: Will remain free from falls  Description: Will remain free from falls  7/28/2021 2352 by [facility]  Outcome: Met This Shift  7/28/2021 2213 by [facility]  Outcome: Met This Shift  7/28/2021 1725 by Manny Handley RN  Outcome: Met This Shift  Goal: Absence of physical injury  Description: Absence of physical injury  7/28/2021 2352 by [facility]  Outcome: Met This Shift  7/28/2021 2213 by [facility]  Outcome: Met This Shift  7/28/2021 1725 by Manny Handley RN  Outcome: Met This Shift     Problem: Pain:  Goal: Pain level will decrease  Description: Pain level will decrease  7/28/2021 2352 by [facility]  Outcome: Met This Shift  7/28/2021 2213 by [facility]  Outcome: Met This Shift  Goal: Control of acute pain  Description: Control of acute pain  7/28/2021 2352 by [facility]  Outcome: Met This Shift  7/28/2021 2213 by [facility]  Outcome: Met This Shift  7/28/2021 1725 by Manny Handley RN  Outcome: Met This Shift  Goal: Control of chronic pain  Description: Control of chronic pain  7/28/202128/2021 2352 by [facility]  Outcome: Met This Shift  7/28/2021 2213 by [facility]  Outcome: Met This Shift     Problem: Musculor/Skeletal Functional Status  Goal: Highest potential functional level  7/28/2021 2352 by [facility]  Outcome: Met This Shift  7/28/2021 2213 by [facility]  Outcome: Met This Shift  Goal: Absence of falls  7/28/2021 2352 by [facility]  Outcome: Met This Shift  7/28/2021 2213 by [facility]  Outcome: Met This Shift  7/28/2021 1725 by Ata Gutierres RN  Outcome: Met This Shift

## 2021-07-29 NOTE — PLAN OF CARE
Problem: Skin Integrity:  Goal: Will show no infection signs and symptoms  Description: Will show no infection signs and symptoms  Outcome: Met This Shift  Goal: Absence of new skin breakdown  Description: Absence of new skin breakdown  7/28/2021 2213 by [facility]  Outcome: Met This Shift  7/28/2021 1725 by Abby Elena RN  Outcome: Met This Shift     Problem: Falls - Risk of:  Goal: Will remain free from falls  Description: Will remain free from falls  7/28/2021 2213 by [facility]  Outcome: Met This Shift  7/28/2021 1725 by Abby Elena RN  Outcome: Met This Shift  Goal: Absence of physical injury  Description: Absence of physical injury  7/28/2021 2213 by [facility]  Outcome: Met This Shift  7/28/2021 1725 by Abby Elena RN  Outcome: Met This Shift     Problem: Musculor/Skeletal Functional Status  Goal: Highest potential functional level  Outcome: Met This Shift  Goal: Absence of falls  7/28/2021 2213 by [facility]  Outcome: Met This Shift  7/28/2021 1725 by Abby Elena RN  Outcome: Met This Shift

## 2021-07-29 NOTE — PROGRESS NOTES
[facility]  ATTENDING PHYSICIAN PROGRESS NOTE     I have examined the patient and  reviewed the record. I have reviewed all relevant labs and imaging data. The following summarizes my clinical findings and independent assessment. CC: fall    S. Patient was seen by urology for renal stone. No intervention  O.  GCS 14, baseline confused  Follows commands  Heart rate is paced  Breathing comfortably no distress, minimal chest wall pain  Strength 5 out of 5 in upper and lower extremities  Abdomen soft nontender nondistended    ASSESSMENT:  Active Problems:    Fall    SDH (subdural hematoma) (HCC)    SAH (subarachnoid hemorrhage) (HCC)    Fracture of multiple ribs of right side    Supratherapeutic INR    Facial contusion  Resolved Problems:    * No resolved hospital problems. *       PLAN:  Subdural/subarachnoid hemorrhageneuro intact repeat head CT scan is stable. No anticoagulation per neurosurgery. Patient was on Coumadin which was reversed with IV vitamin K and Kcentra. Appreciate neurosurgery input    Multiple right-sided rib fracturesaggressive pain control pulmonary toilet    General diet    Alzheimer's diseasethis accounts for his baseline confusion    Right-sided kidney stone with hydronephrosisappreciate urology input    DVT Proph: SCDS/heparin 3 times daily    PT OT  Discharge to [facility] today    Heidi Devi MD, FACS  7/29/2021  4:32 PM    NOTE: This report was transcribed using voice recognition software. Every effort was made to ensure accuracy; however, inadvertent computerized transcription errors may be present.

## 2021-08-09 NOTE — TELEPHONE ENCOUNTER
Spoke with Mrs Henrietta Munoz and her   is doing well in rehab and I told her I did not think Surgery is needed.

## 2021-08-09 NOTE — TELEPHONE ENCOUNTER
Wife called, wanting to make sure we were aware that pt had been hospitalized, and to give an update. States pt is doing well at [facility]. Eating well, going to therapy, has a \"patch\" med ordered for pain -- & it does help. Has a question for DR. Shah while pt was hospitalized because he had fallen and cracked 9 ribs, a nurse mentioned the possibility of pt getting surgery. Wife told her Dr Eula Jacobo previously said that pt should not have general anesthesia unless it was a life threatening situation. No mention of surgery was made after that. Additionally, wife thought no treatment was done for cracked ribs. Wife wants to know if any surgery is needed for pt's rib fractures.

## 2021-08-11 NOTE — PROGRESS NOTES
Physician Progress Note      Shanna To  CSN #:                  [id number]  :                       1940  ADMIT DATE:       2021 2:11 PM  [address] [place] DATE:        2021 1:09 PM  RESPONDING  PROVIDER #:        Ольга Mora MD          QUERY TEXT:    Patient admitted with SDH/SAH. Noted documentation of mechanical fall from   standing height. If possible, please document in progress notes and discharge   summary if you are treating/evaluating any of the following:     The medical record reflects the following:  Risk Factors: Fall, Anticoagulation therapy  Clinical Indicators: CT- Found to have small L frontal SAH and small L   subdural hemorrhage, INR on presentation 3.0, INR on presentation 3.0, NO LOC,   No neuro deficits, Fall from standing position  Treatment: Coumadin reversal with Kcentra and Vitamin K, Neurosurgery consult,   CT Head w/o contrast    Thank you,  Alley Nagel RN, BSN  Options provided:  -- Traumatic SAH/SDH without LOC  -- Nontraumatic SAH/SDH  -- Other - I will add my own diagnosis  -- Disagree - Not applicable / Not valid  -- Disagree - Clinically unable to determine / Unknown  -- Refer to Clinical Documentation Reviewer    PROVIDER RESPONSE TEXT:    This patient has a traumatic SAH/SDH without LOC    Query created by: Refugio Santos on 2021 7:43 AM      Electronically signed by:  Ольга Mora MD 2021 11:10 AM

## 2021-08-13 NOTE — TELEPHONE ENCOUNTER
Wife called, pt is on our schedule next week for an INR, but he is currently at [facility]. Per William Lam, nursing at [facility], pt is not on any blood thinners, not even on Aspirin. States pt came to them on 7/29/21 without any anticoagulant since he had a subdural / subarachnoid hematoma. Please advise.

## 2021-08-16 NOTE — TELEPHONE ENCOUNTER
Wife states Dr Juan Pablo Arana had left her a message on Friday re: blood thinner, but she would like clarification. Informed her that pt has been off of Coumadin because of brain bleed after his fall, and that Dr Juan Pablo Arana spoke with the nurse at [facility] re: resuming it. Wife will discuss with nursing staff at [facility]. Per Jayesh Kim at [facility], Dr Dunia Cid has been out of town, returning today. States their on call DR did not feel comfortable resuming med because they did not know the pt. Anthony Segovia will discuss with Dr Dunia Cid today.
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
## 2021-09-15 NOTE — PROGRESS NOTES
[facility] NEUROSURGERY  [address]  [address] [facility] [address]       Chief Complaint:   Chief Complaint   Patient presents with    Follow-up     hospital f/u,        HPI:     I had the pleasure of seeing Manny Sommers today in neurosurgical clinic. As you know, this 79year old gentleman with multiple medical problems, survived a tiny L frontal extraaxial  Hemorrhage. No seizures, numbness, weakness or tingling. He denies headache. Past Medical History:   Diagnosis Date    Acute myocardial infarction, unspecified site 1995    Myocardial Infarction    Alzheimer's disease ([address]     Anemia associated with acute blood loss 4/11/2013    BPH associated with nocturia 4/9/2013    CAD (coronary artery disease), native coronary artery 4/9/2013    DVT, lower extremity, distal ([address] 4/9/2013    HTN, goal below 130/80 4/9/2013    Hx of blood clots     Hyperlipidemia LDL goal < 70 4/9/2013    Pulmonary emboli ([address] 4/9/2013    Retroperitoneal hematoma 4/11/2013    Shingles 2008     Past Surgical History:   Procedure Laterality Date    BACK SURGERY      CARDIAC DEFIBRILLATOR PLACEMENT      CARDIAC SURGERY      COLONOSCOPY  2010    CORONARY ARTERY BYPASS GRAFT      ECHO COMPL W DOP COLOR FLOW  4/8/2013         ECHO COMPL W DOP COLOR FLOW  4/15/2013         ECHO COMPL W DOP COLOR FLOW  12/6/2013         ENDOSCOPY, COLON, DIAGNOSTIC  2006    HERNIA REPAIR      HERNIA REPAIR  1980    PACEMAKER INSERTION      PACEMAKER PLACEMENT      defibulator no pacemaker    UPPER GASTROINTESTINAL ENDOSCOPY  12/6/13    DR. TO      History reviewed. No pertinent family history.    Social History     Socioeconomic History    Marital status:      Spouse name: ivette     Number of children: 2    Years of education: Not on file    Highest education level: Not on file   Occupational History    Not on file   Tobacco Use    Smoking status: Former Smoker     Packs/day: 1.00     Years: 20.00     Pack years: 20.00     Types: Cigarettes     Start date: 1960     Quit date: 1980     Years since quittin.4    Smokeless tobacco: Never Used   Vaping Use    Vaping Use: Never used   Substance and Sexual Activity    Alcohol use: No    Drug use: No    Sexual activity: Not on file   Other Topics Concern    Not on file   Social History Narrative    ** Merged History Encounter **          Social Determinants of Health     Financial Resource Strain: Low Risk     Difficulty of Paying Living Expenses: Not hard at all   Food Insecurity: No Food Insecurity    Worried About Running Out of Food in the Last Year: Never true    Tin of Food in the Last Year: Never true   Transportation Needs:     Lack of Transportation (Medical):      Lack of Transportation (Non-Medical):    Physical Activity:     Days of Exercise per Week:     Minutes of Exercise per Session:    Stress:     Feeling of Stress :    Social Connections:     Frequency of Communication with Friends and Family:     Frequency of Social Gatherings with Friends and Family:     Attends Temple Services:     Active Member of Clubs or Organizations:     Attends Club or Organization Meetings:     Marital Status:    Intimate Partner Violence:     Fear of Current or Ex-Partner:     Emotionally Abused:     Physically Abused:     Sexually Abused:        Medications:   Current Outpatient Medications   Medication Sig Dispense Refill    sennosides-docusate sodium (SENOKOT-S) 8.6-50 MG tablet Take 1 tablet by mouth 2 times daily 30 tablet 2    lidocaine 4 % external patch Place 1 patch onto the skin daily 2 box 2    levETIRAcetam (KEPPRA) 500 MG tablet Take 1 tablet by mouth 2 times daily 8 tablet 0    lisinopril (PRINIVIL;ZESTRIL) 5 MG tablet TAKE 1 TABLET BY MOUTH  DAILY 90 tablet 3    labetalol (NORMODYNE) 100 MG tablet TAKE 1 TABLET BY MOUTH  TWICE DAILY 180 tablet 3    memantine ER (NAMENDA XR) 28 MG CP24 extended release capsule TAKE 1 CAPSULE BY MOUTH  DAILY 90 capsule 3    rosuvastatin (CRESTOR) 10 MG tablet TAKE 1 TABLET BY MOUTH  DAILY 90 tablet 3    furosemide (LASIX) 20 MG tablet Take one pill every other day 30 tablet 3    rivastigmine (EXELON) 3 MG capsule Take 1 capsule by mouth 2 times daily 180 capsule 3    amLODIPine (NORVASC) 5 MG tablet TAKE 1 TABLET BY MOUTH  DAILY 90 tablet 3    ranolazine (RANEXA) 500 MG extended release tablet TAKE 1 TABLET BY MOUTH  DAILY 90 tablet 3    omeprazole (PRILOSEC) 40 MG delayed release capsule TAKE 1 CAPSULE BY MOUTH IN  THE MORNING 90 capsule 3    vitamin B-12 (CYANOCOBALAMIN) 1000 MCG tablet Take 1,000 mcg by mouth daily      finasteride (PROSCAR) 5 MG tablet Take 5 mg by mouth three times a week On Monday, Wednesday, and Friday      tamsulosin (FLOMAX) 0.4 MG capsule Take 0.4 mg by mouth daily      magnesium hydroxide (MILK OF MAGNESIA) 400 MG/5ML suspension Take by mouth daily as needed for Constipation      acetaminophen (TYLENOL) 500 MG tablet Take 500 mg by mouth as needed for Pain \"Seldom takes\".  Cholecalciferol (VITAMIN D3) 2000 UNITS CAPS Take 2,000 Units by mouth daily.  nitroGLYCERIN (NITROSTAT) 0.4 MG SL tablet Place 0.4 mg under the tongue every 5 minutes as needed. No current facility-administered medications for this visit. Allergies:    Heparin, Tape [adhesive tape], and Tape [adhesive tape]       Review of Systems:    Denies any chest pain, shortness of breath, headache, dyspnea, recent weight loss, fevers, chills or night sweats. Physical Examination:    /60   Pulse 73   Temp 97.3 °F (36.3 °C)   Resp 18   Ht 5' 8\" (1.727 m)   Wt 163 lb (73.9 kg)   SpO2 93%   BMI 24.78 kg/m²      On focused neurological examination, he  is awake alert oriented and conversant. Speech is clear and fluent.   Pupils are equal and reactive to light bilaterally, extraocular movements are intact, visual fields are full to confrontation. His  face is symmetric and grossly intact to fine touch. Uvula and tongue are both midline. Shoulder shrug is symmetric and strong. Motor examination reveals preserved power in the upper and lower extremities at 5 out of 5 throughout. Reflexes are symmetric and brisk. Plantar responses are downgoing. There is no clonus. Patient is intact to fine touch in all dermatomes throughout. ASSESSMENT:    Intracranial hemorrhage      MEDICAL DECISION MAKING & PLAN:    No follow up is needed unless the patient develops new neurosurgical issues. Thank you so much for allowing us to participate in the care of this patient.     Electronically signed by Ralph Lee MD on 9/15/2021 at 12:47 PM

## 2021-09-28 NOTE — ED NOTES
Pt to ed via ems from [facility] with report of fall. Unwitnessed. Abrasions noted to forehead. Pt alert to self and opens eyes to speech. Report from ems that this is patients baseline. VSS. Pt unable to verbalize or localize pain at present. Respirations easy. No s/s of acute distress.       Marisela Boogie RN  09/28/21 [id number]

## 2021-09-28 NOTE — ED PROVIDER NOTES
HPI:  9/28/21,   Time: 5:37 PM EDT        José Sahu is a 79 y.o. male presenting to the ED for unwitnessed fall. Patient presents via EMS from [facility] [place]. Per staff report patient had unwitnessed fall but it was reported that patient did not have LOC. Patient was superficial abrasion to forehead and skin tear to left shoulder. History otherwise limited as patient AAO x1 at baseline and unable to provide further history. ROS:   Unable to obtain full review of systems due to patient's baseline mental status      --------------------------------------------- PAST HISTORY ---------------------------------------------  Past Medical History:  has a past medical history of Acute myocardial infarction, unspecified site, Alzheimer's disease ([address] Anemia associated with acute blood loss, BPH associated with nocturia, CAD (coronary artery disease), native coronary artery, DVT, lower extremity, distal ([address] HTN, goal below 130/80, Hx of blood clots, Hyperlipidemia LDL goal < 70, Pulmonary emboli ([address] Retroperitoneal hematoma, and Shingles. Past Surgical History:  has a past surgical history that includes hernia repair; back surgery; Coronary artery bypass graft; Cardiac surgery; hernia repair (1980); Endoscopy, colon, diagnostic (2006); Colonoscopy (2010); ECHO Compl W Dop Color Flow (4/8/2013); ECHO Compl W Dop Color Flow (4/15/2013); ECHO Compl W Dop Color Flow (12/6/2013); Upper gastrointestinal endoscopy (12/6/13); Pacemaker insertion; Cardiac defibrillator placement; and pacemaker placement. Social History:  reports that he quit smoking about 41 years ago. His smoking use included cigarettes. He started smoking about 61 years ago. He has a 20.00 pack-year smoking history. He has never used smokeless tobacco. He reports that he does not drink alcohol and does not use drugs. Family History: family history is not on file. The patients home medications have been reviewed.     Allergies: Heparin, Tape Brayden Charan tape], and Tape [adhesive tape]    -------------------------------------------------- RESULTS -------------------------------------------------  All laboratory and radiology results have been personally reviewed by myself   LABS:  Results for orders placed or performed during the hospital encounter of 09/28/21   CBC Auto Differential   Result Value Ref Range    WBC 8.0 4.5 - 11.5 E9/L    RBC 4.26 3.80 - 5.80 E12/L    Hemoglobin 13.6 12.5 - 16.5 g/dL    Hematocrit 42.7 37.0 - 54.0 %    .2 (H) 80.0 - 99.9 fL    MCH 31.9 26.0 - 35.0 pg    MCHC 31.9 (L) 32.0 - 34.5 %    RDW 14.7 11.5 - 15.0 fL    Platelets 768 624 - 828 E9/L    MPV 9.4 7.0 - 12.0 fL    Neutrophils % 81.9 (H) 43.0 - 80.0 %    Immature Granulocytes % 4.4 0.0 - 5.0 %    Lymphocytes % 7.1 (L) 20.0 - 42.0 %    Monocytes % 6.0 2.0 - 12.0 %    Eosinophils % 0.4 0.0 - 6.0 %    Basophils % 0.2 0.0 - 2.0 %    Neutrophils Absolute 6.59 1.80 - 7.30 E9/L    Immature Granulocytes # 0.35 E9/L    Lymphocytes Absolute 0.57 (L) 1.50 - 4.00 E9/L    Monocytes Absolute 0.48 0.10 - 0.95 E9/L    Eosinophils Absolute 0.03 (L) 0.05 - 0.50 E9/L    Basophils Absolute 0.02 0.00 - 0.20 E9/L    Anisocytosis 1+     Poikilocytes 1+     Carefree Cells 1+    Comprehensive Metabolic Panel w/ Reflex to MG   Result Value Ref Range    Sodium 142 132 - 146 mmol/L    Potassium reflex Magnesium 4.9 3.5 - 5.0 mmol/L    Chloride 108 (H) 98 - 107 mmol/L    CO2 23 22 - 29 mmol/L    Anion Gap 11 7 - 16 mmol/L    Glucose 95 74 - 99 mg/dL    BUN 14 6 - 23 mg/dL    CREATININE 0.8 0.7 - 1.2 mg/dL    GFR Non-African American >60 >=60 mL/min/1.73    GFR African American >60     Calcium 9.1 8.6 - 10.2 mg/dL    Total Protein 6.0 (L) 6.4 - 8.3 g/dL    Albumin 3.8 3.5 - 5.2 g/dL    Total Bilirubin 0.9 0.0 - 1.2 mg/dL    Alkaline Phosphatase 100 40 - 129 U/L    ALT 14 0 - 40 U/L    AST 15 0 - 39 U/L   Protime-INR   Result Value Ref Range    Protime 14.3 (H) 9.3 - 12.4 sec    INR 1.3    APTT Result Value Ref Range    aPTT 27.9 24.5 - 35.1 sec   TYPE AND SCREEN   Result Value Ref Range    ABO/Rh O POS     Antibody Screen NEG        RADIOLOGY:  Interpreted by Radiologist.  CT Head WO Contrast   Final Result   Addendum 1 of 1   ADDENDUM:   called         Final   Small mixed density subdural hematoma along the anterior interhemispheric   fissure and right frontal area which may represent combination of late acute,   subacute and chronic subdural hematoma. There is no midline shift. Diffuse atrophy with small vessel ischemic disease. CT cervical spine. There is no acute displaced fracture in the cervical spine. Mild 15%   compression deformity of the superior endplate of T1 is noted which is   unchanged. There is diffuse degenerative changes from C2-T1 with osteophytes   and multilevel disc bulges. Impression      No acute fracture or dislocation in the cervical spine. Diffuse degenerative changes with multilevel disc bulges from C2-T1. Old mild compression deformity of the superior endplate of T1 without change. Critical results were called by Dr. Brown Gone to to Dr. Tracie Pham on   9/28/2021 at 18:52. CT Cervical Spine WO Contrast   Final Result   Addendum 1 of 1   ADDENDUM:   called         Final   Small mixed density subdural hematoma along the anterior interhemispheric   fissure and right frontal area which may represent combination of late acute,   subacute and chronic subdural hematoma. There is no midline shift. Diffuse atrophy with small vessel ischemic disease. CT cervical spine. There is no acute displaced fracture in the cervical spine. Mild 15%   compression deformity of the superior endplate of T1 is noted which is   unchanged. There is diffuse degenerative changes from C2-T1 with osteophytes   and multilevel disc bulges. Impression      No acute fracture or dislocation in the cervical spine.       Diffuse degenerative changes with multilevel disc bulges from C2-T1. Old mild compression deformity of the superior endplate of T1 without change. Critical results were called by Dr. Cedeno Fix to to Dr. Addy Johnson on   9/28/2021 at 18:52. XR CHEST PORTABLE   Final Result   1. Somewhat limited exam      2. At least mild cardiomegaly, and equivalent central pulmonary vascular   congestion, with minimal to mild bibasilar subsegmental atelectasis versus   infiltrate, worse on the left. 3.  Otherwise, no significant change. Heddy  RECOMMENDATION:   1. Recommend follow-up thoracic imaging, as directed clinically. .         XR PELVIS (1-2 VIEWS)   Final Result   1. Somewhat limited exam.      2.  Questionable, non-displaced to minimally-displaced fractures involving   the left superior and inferior pubic rami, which could be artifactual to   patient positioning. 3.  Lesser incidental findings, as described. Please see above comments. .      RECOMMENDATION:   1. Recommend noncontrast CT of the pelvis for further evaluation. .         XR SHOULDER LEFT (MIN 2 VIEWS)   Final Result   No evidence of acute fracture or dislocation of the left shoulder. Prominent degenerative changes and chronic rotator cuff tear.             ------------------------- NURSING NOTES AND VITALS REVIEWED ---------------------------   The nursing notes within the ED encounter and vital signs as below have been reviewed.    /72   Pulse 68   Temp 97.7 °F (36.5 °C) (Oral)   Resp 16   Ht 5' 8\" (1.727 m)   Wt 163 lb (73.9 kg)   SpO2 95%   BMI 24.78 kg/m²   Oxygen Saturation Interpretation: Normal    ---------------------------------------------------PHYSICAL EXAM--------------------------------------    GEN: No acute distress, elderly appearing, well nourished   HENT: Normocephalic, atraumatic, oral mucosa moist  EYES: No scleral injection, no scleral icterus, PERRL   PULM: Lungs clear to ascultation bilaterally, no wheezes, no crackles  CARDIO: Regular rate, regular rhythm, normal S1/S2  ABD: Soft, non-tender, no rigidity  MSK: No deformities, no edema, palpable pulses all extremities   SKIN: + Multiple small superficial abrasions to forehead. +2 cm skin tear noted to left shoulder. No repairable lacerations. No other bruising noted. NEURO: AAO x1 -baseline per report, moving all extremities, grossly nonfocal        ------------------------------ ED COURSE/MEDICAL DECISION MAKING----------------------  Medications   Tetanus-Diphth-Acell Pertussis (BOOSTRIX) injection 0.5 mL (0.5 mLs IntraMUSCular Given 9/28/21 2242)   phytonadione ADULT (VITAMIN K) inj 10 mg/mL (10 mg SubCUTAneous Given 9/28/21 1958)         ED Course and Medical Decision Making:   Patient presents for unwitnessed fall. History dementia at baseline. At baseline mental status per report. Patient with acute on chronic small subdural hematoma noted on imaging, no traumatic injuries noted. Patient hemodynamically stable throughout ED stay. Patient is on Coumadin, was given vitamin K for reversal.  INR only 1.3 therefore Kcentra was held due to subtherapeutic INR. Patient transferred to [facility] for evaluation by trauma surgery and ongoing evaluation and management. Spoke with ED physician at [facility], case discussed, accepted for transfer. Patient hemodynamically stable for transfer. Son agreeable to plan.       --------------------------------- ADDITIONAL PROVIDER NOTES ---------------------------------    This patient's ED course included: re-evaluation prior to disposition and a personal history and physicial eaxmination    This patient has remained hemodynamically stable during their ED course. Counseling:   The emergency provider has spoken with the patient and son and discussed todays results, in addition to providing specific details for the plan of care

## 2021-09-29 PROBLEM — S06.5XAA SUBDURAL HEMATOMA: Status: ACTIVE | Noted: 2021-01-01

## 2021-09-29 NOTE — ED NOTES
Pt stood bedside x 1 to urinate. Pt unsteady on his feet and confused.       Jumana Varghese RN  09/29/21 [id number]

## 2021-09-29 NOTE — ED NOTES
Brief changed and gianni care performed. Pt resting comfortably.       Shari Alvarez RN  09/29/21 0237

## 2021-09-29 NOTE — ED PROVIDER NOTES
[address]      Pt Name: Sanya Larson  MRN: [id number]  [place] 1940  Date of evaluation: 9/29/2021      CHIEF COMPLAINT       Chief Complaint   Patient presents with   Angela Salazar Consultation     transfer from [address] [place] [address]. HPI  Sanya Larson is a 79 y.o. male with history of dementia, coronary artery disease who presents from transfer from [facility] after being found on the ground at nursing home. Baseline patient oriented just to self. Found to have acute versus chronic subdural hematoma on CT imaging at [facility].  Patient on Coumadin but INR 1.3 no medications given to reverse prior to arrival.  Patient complains of mild headache. History is limited secondary to his dementia. Review of Systems   Unable to perform ROS: Dementia        Physical Exam  Vitals and nursing note reviewed. Constitutional:       General: He is not in acute distress. Appearance: He is well-developed. Comments: Awake and alert. Sitting in the gurney in no obvious distress. HENT:      Head: Normocephalic. Comments: Abrasion to the forehead with mild swelling. Mouth/Throat:      Mouth: Mucous membranes are moist.      Pharynx: No oropharyngeal exudate. Eyes:      General: No scleral icterus. Pupils: Pupils are equal, round, and reactive to light. Cardiovascular:      Rate and Rhythm: Normal rate and regular rhythm. Heart sounds: Normal heart sounds. No murmur heard. Comments: 2+ radial and dorsal pedis pulses bilaterally  Pulmonary:      Effort: Pulmonary effort is normal. No respiratory distress. Breath sounds: Normal breath sounds. No wheezing. Abdominal:      Palpations: Abdomen is soft. Tenderness: There is no abdominal tenderness. There is no guarding or rebound. Musculoskeletal:         General: No tenderness or deformity. Normal range of motion. Cervical back: Normal range of motion and neck supple. Right lower leg: No edema. Left lower leg: No edema. Skin:     General: Skin is warm and dry. Capillary Refill: Capillary refill takes less than 2 seconds. Findings: No rash. Neurological:      General: No focal deficit present. Mental Status: He is alert. Cranial Nerves: No cranial nerve deficit. Sensory: No sensory deficit. Motor: No weakness or abnormal muscle tone. Comments: Oriented to person but not place or time. Moving all extremities. Psychiatric:         Mood and Affect: Mood normal.         Behavior: Behavior normal.          Procedures     MDM   This is a 57-year-old male with history of dementia, pulmonary embolism on Coumadin who presents from [facility] as transfer after being found to have a acute versus subacute versus chronic subdural hematoma after being found down at nursing home earlier today. Patient at baseline wanted this self but not place or time. Moving all extremities. Reviewed previous imaging from [facility]. Consulted trauma surgery saw the patient in the ED. Discussed the case with Dr. Dali Ellison who accepts the patient for further evaluation in the hospital for subdural.  INR subtherapeutic and therefore no need for medication reversal.                --------------------------------------------- PAST HISTORY ---------------------------------------------  Past Medical History:  has a past medical history of Acute myocardial infarction, unspecified site, Alzheimer's disease ([address] Anemia associated with acute blood loss, BPH associated with nocturia, CAD (coronary artery disease), native coronary artery, DVT, lower extremity, distal ([address] HTN, goal below 130/80, Hx of blood clots, Hyperlipidemia LDL goal < 70, Pulmonary emboli ([address] Retroperitoneal hematoma, and Shingles.     Past Surgical History:  has a past surgical history that includes hernia repair; back surgery; Coronary artery bypass graft; Cardiac surgery; hernia repair (1980); Endoscopy, colon, diagnostic (2006); Colonoscopy (2010); ECHO Compl W Dop Color Flow (4/8/2013); ECHO Compl W Dop Color Flow (4/15/2013); ECHO Compl W Dop Color Flow (12/6/2013); Upper gastrointestinal endoscopy (12/6/13); Pacemaker insertion; Cardiac defibrillator placement; and pacemaker placement. Social History:  reports that he quit smoking about 41 years ago. His smoking use included cigarettes. He started smoking about 61 years ago. He has a 20.00 pack-year smoking history. He has never used smokeless tobacco. He reports that he does not drink alcohol and does not use drugs. Family History: family history is not on file. The patients home medications have been reviewed. Allergies: Heparin, Tape [adhesive tape], and Tape [adhesive tape]    -------------------------------------------------- RESULTS -------------------------------------------------    LABS:  Results for orders placed or performed during the hospital encounter of 09/29/21   COVID-19, Rapid    Specimen: Nasopharyngeal Swab   Result Value Ref Range    SARS-CoV-2, NAAT DETECTED (A) Not Detected       RADIOLOGY:  CT HEAD WO CONTRAST   Final Result   Previously noted small amount of subdural blood along the anterior falx and   in the right frontal region is not definitely seen. No acute intracranial   hemorrhage or mass effect. Chronic small vessel ischemic disease. XR HIP 2-3 VW W PELVIS LEFT   Final Result   Chronic appearing findings as detailed above. However if there is high   clinical concern for acute process, CT would offer further assessment. EKG:  This EKG is signed and interpreted by me.     Rate: 85bpm  Rhythm: atrial sensed ventriclar paced rythm  Interpretation: atrial sensed ventricular paced rhythm  Comparison: stable as compared to patient's most recent EKG      ------------------------- NURSING NOTES AND VITALS REVIEWED ---------------------------  Date / Time Roomed:  9/29/2021  1:55 AM  ED Bed Assignment:  18A/18A-18    The nursing notes within the ED encounter and vital signs as below have been reviewed. Patient Vitals for the past 24 hrs:   BP Temp Pulse Resp SpO2 Height Weight   09/29/21 0214 110/74 -- 76 16 93 % -- --   09/29/21 0156 116/68 97.9 °F (36.6 °C) 89 16 93 % 5' 8\" (1.727 m) 163 lb (73.9 kg)       Oxygen Saturation Interpretation: Normal    ------------------------------------------ PROGRESS NOTES ------------------------------------------    Counseling:  I have spoken with the patient and discussed todays results, in addition to providing specific details for the plan of care and counseling regarding the diagnosis and prognosis. Their questions are answered at this time and they are agreeable with the plan of admission.    --------------------------------- ADDITIONAL PROVIDER NOTES ---------------------------------  Consultations:   Spoke with Dr. Jodelle Saint. Discussed case. They will admit the patient. This patient's ED course included: a personal history and physicial examination, re-evaluation prior to disposition and multiple bedside re-evaluations    This patient has remained hemodynamically stable during their ED course. Diagnosis:  1. Injury of head, initial encounter    2. Subdural hematoma (HCC)    3. COVID-19 virus infection        Disposition:  Patient's disposition: Admit to med/surg floor  Patient's condition is stable. Ebony Pickett DO  Resident  09/29/21 8380    ATTENDING PROVIDER ATTESTATION:     I have personally performed and/or participated in the history, exam, medical decision making, and procedures and agree with all pertinent clinical information. I have also reviewed and agree with the past medical, family and social history unless otherwise noted.     I have discussed this patient in detail with the resident, and provided the instruction and education regarding history of fall. My findings/Plan: I was the primary provider for patient. Patient presenting here because of fall. Patient was seen at outlying facility and transferred here. Patient only oriented to person. Patient unable to give history as to how he had fallen. CT showed subdural acute and subacute as well as chronic. Patient does have noted abrasions to his forehead. Patient moving all extremities heart lung exam normal abdomen is soft and nontender. Labs from outlying facility noted reviewed as well as CTs. Trauma service was consulted and requested that patient be admitted medically. Patient vital signs noted. We did speak to his PCP. Patient will be admitted to monitored bed for further evaluation and treatment.          Delia Prasad MD  09/29/21 [address], [place]  09/29/21 7470

## 2021-09-29 NOTE — ED NOTES
Patient responds to voice. Follows directions when asked. Wound noted to left shoulder and forehead. Pt states that he is cold, assumed fetal position and warm blanket provided. Pt placed on monitor, will continue to monitor.       Pepe Asher RN  09/29/21 0305

## 2021-09-29 NOTE — ED NOTES
Report received,  Assumed care of pt at this time. Pt resting comfortably with eyes closes. NAD noted.  LIZ Pineda RN  09/29/21 6804

## 2021-09-29 NOTE — PROGRESS NOTES
This RN assumed care, edematous left forearm noted with infiltrated IV. IV removed and arm elevated. Patient resting comfortably in bed, follows commands, oriented to self only. Voices no complaints.

## 2021-09-29 NOTE — ED NOTES
Called by [facility] regarding repeat CT head normal and trauma signing off recommending dc  [facility] evaluated, they recommended dc back to nursing home  He has covid 19 but is not hypoxemic, breathing normally, not in distress.  No indication for admission  He is at neurologic baseline as well     Briseyda Hayes MD  09/29/21 7698

## 2021-09-29 NOTE — PROGRESS NOTES
Attending Attestation   Patient seen and examined, agree with resident note, for remaining HP/Consult details please see resident HP/Consult note. TRAUMA SURGERY  ATTENDING PROGRESS NOTE      CC: found down     S:   confused, mild R arm pain at IV site. Otherwise denies pain     O:   @BP (!) 142/62   Pulse 80   Temp 98.5 °F (36.9 °C) (Oral)   Resp 20   Ht 5' 8\" (1.727 m)   Wt 163 lb (73.9 kg)   SpO2 97%   BMI 24.78 kg/m² @    Gen - no apparent distress   Neuro - Awake, alert, attentive    HEENT - PERRL 3mm   Lungs - non labored, BS clear b/l    Heart - RR no extra heart sounds    Abdomen - SNT  Spine -   Non tender CTL   Ext- rom wnl     A/P: found down presumed fall,       Active Problems:    Subdural hematoma (HCC)  Resolved Problems:    * No resolved hospital problems. *      - Repeat head imaging not convincing for any ICH,   - Trauma will s/o please don't hesitate to call if there are any concerns. Cooper Snell MD FACS               TRAUMA HISTORY & PHYSICAL  Surgical Resident/Advance Practice Nurse  9/29/2021  2:48 AM     PRIMARY SURVEY     CHIEF COMPLAINT:  Trauma consult. Injury occurred earlier this afternoon. Pt is a 79year old male that was transferred form [address] [place] [address] after sustaining an unwitnessed fall at his SNF. He is only oriented to self and unable to provide a history so most of it is obtained per chart review. He was reported to have an unwitnessed fall without LOC. He was found to have a superficial abrasion to the forehead and skin tear to the left shoulder.  He does not complain of any pain at all.     AIRWAY:   Airway Normal  EMS ETT Absent  Noisy respirations Absent  Retractions: Absent  Vomiting/bleeding: Absent        BREATHING:    Midaxillary breath sound left:  Normal  Midaxillary breath sound right:  Normal     Cough sound intensity:  good   FiO2: Room Air  [facility] Unable to perform due to mental status       CIRCULATION:   Femerol pulse intensity: Strong  Palpebral conjunctiva: Pink             Vitals:     09/29/21 0214   BP: 110/74   Pulse: 76   Resp: 16   Temp:     SpO2: 93%         Vitals        Vitals:     09/29/21 0156 09/29/21 0214   BP: 116/68 110/74   Pulse: 89 76   Resp: 16 16   Temp: 97.9 °F (36.6 °C)     SpO2: 93% 93%   Weight: 163 lb (73.9 kg)     Height: 5' 8\" (1.727 m)              FAST EXAM: Deferred     Central Nervous System     GCS Initial 15 minutes   Eye  Motor  Verbal 4 - Opens eyes on own  6 - Follows simple motor commands  4 - Seems confused, disoriented 4 - Opens eyes on own  6 - Follows simple motor commands  4 - Seems confused, disoriented      Neuromuscular blockade: No  Pupil size:      Left 3 mm                          Right 3 mm  Pupil reaction: Yes     Wiggles fingers: Left Yes Right Yes  Wiggles toes: Left Yes   Right Yes     Hand grasp:   Left  Present                            Right  Present  Plantar flexion:          Left  Present                                       Right   Present     Loss of consciousness:  No  History Obtained From:  Patient & EMS  Private Medical Doctor: Davis Davidson MD        Pre-exisiting Medical History:  yes     Conditions: Alzheimer's, HTN, BPH, CAD, Constipation     Medications: Senokot, Lisinopril, Crestor, Labetalol, Norvasc, Rivastigmine, Ranexa, Flomax, Nitrostat     Allergies: Heparin, adhesive tape     Social History:   Tobacco use:   Former smoker years ago  Alcohol use:  none  Illicit drug use:  no history of illicit drug use     Past Surgical History:  Hernia repair, back surgery, CABG, pacemaker     Anticoagulant use:  No (none per the chart)  Antiplatelet use:    No (none per the chart)     NSAID use in last 72 hours: unknown  Taken PCN in past:  unknown  Last food/drink: Unknown  Last tetanus: Unknown     Family History:   Not pertinent to presenting problem.       Complaints:   Head:  None  Neck:   None  Chest:   None  Back:   None  Abdomen:   None  Extremities:   None  Comments: No complaints     Review of systems:  All negative unless otherwise noted.         SECONDARY SURVEY  Head/scalp: Abrasion to forehead     Face: Atraumatic     Eyes/ears/nose: Atraumatic     Pharynx/mouth: Atraumatic     Neck: Atraumatic      Cervical spine tenderness:   Cervical collar not indicated  Pain:  none  ROM:  Full     Chest wall:  Atraumatic     Heart:  Regular rate & rhythm     Abdomen: Atraumatic. Soft ND  Tenderness:  none     Pelvis: Atraumatic  Tenderness: none     Thoracolumbar spine: Atraumatic  Tenderness:  none     Genitourinary:  Atraumatic. No blood or urine noted     Rectum: Atraumatic. No blood noted.       Perineum: Atraumatic. No blood or urine noted.       Extremities:   RUE Atraumatic  LUE Abrasion/skin tear to left shoulder  RLE Atraumatic  LLE Atraumatic  Sensory normal  Motor normal     Distal Pulses  Left arm normal  Right arm normal  Left leg normal  Right leg normal     Capillary refill  Left arm normal  Right arm normal  Left leg normal  Right leg normal     Procedures in ED:  None     In the event of Emergency Blood Transfusion:  Due to the critical condition of this patient, I request the immediate release of blood products for emergency transfusion secondary to shock.  I understand the increased risks incurred by the lack of complete transfusion testing.       Radiology: Chest Xray , Pelvic Xray , CT Head  and CT Cervical spine      Consultations:  Neurosurgery     Admission/Diagnosis: Chronic SDH     Plan of Treatment:  Admit to medicine  TEG  Repeat head CT- showed no ICH  L hip XR- negative for fracture  Okay to DC from trauma POV

## 2021-09-29 NOTE — Clinical Note
Patient Class: Observation [104]   REQUIRED: Diagnosis: Subdural hematoma ([facility] [[id number]]   Estimated Length of Stay: Estimated stay of less than 2 midnights   Admitting Provider: Dali Allan [[id number]]   Telemetry/Cardiac Monitoring Required?: No

## 2021-09-29 NOTE — CONSULTS
TRAUMA HISTORY & PHYSICAL  Surgical Resident/Advance Practice Nurse  9/29/2021  2:48 AM    PRIMARY SURVEY    CHIEF COMPLAINT:  Trauma consult. Injury occurred earlier this afternoon. Pt is a 79year old male that was transferred form [facility] after sustaining an unwitnessed fall at his SNF. He is only oriented to self and unable to provide a history so most of it is obtained per chart review. He was reported to have an unwitnessed fall without LOC. He was found to have a superficial abrasion to the forehead and skin tear to the left shoulder. He does not complain of any pain at all.     AIRWAY:   Airway Normal  EMS ETT Absent  Noisy respirations Absent  Retractions: Absent  Vomiting/bleeding: Absent      BREATHING:    Midaxillary breath sound left:  Normal  Midaxillary breath sound right:  Normal    Cough sound intensity:  good   FiO2: Room Air  [facility] Unable to perform due to mental status      CIRCULATION:   Femerol pulse intensity: Strong  Palpebral conjunctiva: Pink       Vitals:    09/29/21 0214   BP: 110/74   Pulse: 76   Resp: 16   Temp:    SpO2: 93%       Vitals:    09/29/21 0156 09/29/21 0214   BP: 116/68 110/74   Pulse: 89 76   Resp: 16 16   Temp: 97.9 °F (36.6 °C)    SpO2: 93% 93%   Weight: 163 lb (73.9 kg)    Height: 5' 8\" (1.727 m)         FAST EXAM: Deferred    Central Nervous System    GCS Initial 15 minutes   Eye  Motor  Verbal 4 - Opens eyes on own  6 - Follows simple motor commands  4 - Seems confused, disoriented 4 - Opens eyes on own  6 - Follows simple motor commands  4 - Seems confused, disoriented     Neuromuscular blockade: No  Pupil size:  Left 3 mm    Right 3 mm  Pupil reaction: Yes    Wiggles fingers: Left Yes Right Yes  Wiggles toes: Left Yes   Right Yes    Hand grasp:   Left  Present      Right  Present  Plantar flexion: Left  Present      Right   Present    Loss of consciousness:  No  History Obtained From:  Patient & EMS  Private Medical Doctor: Diandra Lyon MD      Pre-exisiting Medical History:  yes    Conditions: Alzheimer's, HTN, BPH, CAD, Constipation    Medications: Senokot, Lisinopril, Crestor, Labetalol, Norvasc, Rivastigmine, Ranexa, Flomax, Nitrostat    Allergies: Heparin, adhesive tape    Social History:   Tobacco use: Former smoker years ago  Alcohol use:  none  Illicit drug use:  no history of illicit drug use    Past Surgical History:  Hernia repair, back surgery, CABG, pacemaker    Anticoagulant use:  No (none per the chart)  Antiplatelet use:    No (none per the chart)    NSAID use in last 72 hours: unknown  Taken PCN in past:  unknown  Last food/drink: Unknown  Last tetanus: Unknown    Family History:   Not pertinent to presenting problem. Complaints:   Head:  None  Neck:   None  Chest:   None  Back:   None  Abdomen:   None  Extremities:   None  Comments: No complaints    Review of systems:  All negative unless otherwise noted. SECONDARY SURVEY  Head/scalp: Abrasion to forehead    Face: Atraumatic    Eyes/ears/nose: Atraumatic    Pharynx/mouth: Atraumatic    Neck: Atraumatic     Cervical spine tenderness:   Cervical collar not indicated  Pain:  none  ROM:  Full    Chest wall:  Atraumatic    Heart:  Regular rate & rhythm    Abdomen: Atraumatic. Soft ND  Tenderness:  none    Pelvis: Atraumatic  Tenderness: none    Thoracolumbar spine: Atraumatic  Tenderness:  none    Genitourinary:  Atraumatic. No blood or urine noted    Rectum: Atraumatic. No blood noted. Perineum: Atraumatic. No blood or urine noted.       Extremities:   RUE Atraumatic  LUE Abrasion/skin tear to left shoulder  RLE Atraumatic  LLE Atraumatic  Sensory normal  Motor normal    Distal Pulses  Left arm normal  Right arm normal  Left leg normal  Right leg normal    Capillary refill  Left arm normal  Right arm normal  Left leg normal  Right leg normal    Procedures in ED:  None    In the event of Emergency Blood Transfusion:  Due to the critical condition of this patient, I request the immediate release of blood products for emergency transfusion secondary to shock. I understand the increased risks incurred by the lack of complete transfusion testing.       Radiology: Chest Xray , Pelvic Xray , CT Head  and CT Cervical spine     Consultations:  Neurosurgery    Admission/Diagnosis: Chronic SDH    Plan of Treatment:  Admit to medicine  TEG  Repeat head CT- showed no ICH  L hip XR- negative for fracture  Okay to DC from trauma POV    Plan discussed with Dr. Toy Beltrán at 9/29/2021 on 2:48 AM    Electronically signed by Neeraj Quevedo MD on 9/29/2021 at 2:48 AM

## 2021-09-29 NOTE — ED NOTES
[facility] contacted for transport back to [facility]. ETA 2-3 hours at this time. Report called to [facility].       Audi Morales RN  09/29/21 5298

## 2021-09-30 NOTE — ED NOTES
Bed: 13  Expected date: 9/30/21  Expected time:   Means of arrival:   Comments:     Adrian Chand RN  09/30/21 1301

## 2021-09-30 NOTE — ED NOTES
Update from [facility] approx 60-90 min     Flora Crespo, [address] [facility]  09/30/21 3495

## 2021-09-30 NOTE — ED NOTES
This nurse rounding on pt , noted dry brief at this time, offered toileting d/t physician arrivaling appr 15 minutes, pt denies needing to use urinal. Bed alarm maintained for safety. Pt resting .      Jose Fischer LPN  22/42/81 [id number]

## 2021-09-30 NOTE — ED PROVIDER NOTES
Chief Complaint   Patient presents with    Aphasia     seen last night for possible brain bleed @ [facility]       Patient is an 54-year-old male presents today for concerns of slurred speech. Per nursing home he did fall the other day, was evaluated at [facility] and was discharged home. They are concerned that he may have fallen again. On arrival he is alert to person, this is his baseline per EMS. He is moving all extremities equally, following commands. Symptoms not made better worse by anything specific, has not tried a medication for the symptoms. The history is provided by the patient and the EMS personnel. No  was used. Review of Systems   Unable to perform ROS: Mental status change    at baseline    Physical Exam  Vitals and nursing note reviewed. Constitutional:       General: He is not in acute distress. Appearance: Normal appearance. He is well-developed. He is not ill-appearing. HENT:      Head: Normocephalic and atraumatic. Eyes:      Pupils: Pupils are equal, round, and reactive to light. Cardiovascular:      Rate and Rhythm: Normal rate and regular rhythm. Pulses: Normal pulses. Heart sounds: Normal heart sounds. No murmur heard. Pulmonary:      Effort: Pulmonary effort is normal. No respiratory distress. Breath sounds: Normal breath sounds. No wheezing or rales. Abdominal:      General: Bowel sounds are normal.      Palpations: Abdomen is soft. Tenderness: There is no abdominal tenderness. There is no guarding or rebound. Musculoskeletal:      Cervical back: Normal range of motion and neck supple. No rigidity or tenderness. Right lower leg: No edema. Left lower leg: No edema. Skin:     General: Skin is warm and dry. Capillary Refill: Capillary refill takes less than 2 seconds. Neurological:      General: No focal deficit present. Mental Status: He is alert. He is disoriented.       Cranial Nerves: No cranial nerve deficit. Coordination: Coordination normal.      Comments: Alert to person only-baseline per EMS  Moving all extremities equally and following commands          Procedures     Labs Reviewed   CBC WITH AUTO DIFFERENTIAL - Abnormal; Notable for the following components:       Result Value    MCHC 31.7 (*)     Neutrophils % 92.3 (*)     Lymphocytes % 4.3 (*)     Neutrophils Absolute 9.57 (*)     Lymphocytes Absolute 0.42 (*)     Eosinophils Absolute 0.00 (*)     All other components within normal limits   COMPREHENSIVE METABOLIC PANEL W/ REFLEX TO MG FOR LOW K - Abnormal; Notable for the following components:    Glucose 143 (*)     Total Protein 5.9 (*)     All other components within normal limits   TROPONIN - Abnormal; Notable for the following components:    Troponin, High Sensitivity 85 (*)     All other components within normal limits   URINALYSIS - Abnormal; Notable for the following components:    Color, UA ANALISA (*)     Clarity, UA CLOUDY (*)     Ketones, Urine TRACE (*)     Blood, Urine LARGE (*)     Protein, UA 30 (*)     All other components within normal limits   LACTIC ACID, PLASMA - Abnormal; Notable for the following components:    Lactic Acid 4.4 (*)     All other components within normal limits    Narrative:     CALL  [place]  H34 tel. ,  Chemistry results called to and read back by analisa ramey, 09/30/2021 14:45, by  [facility] - Abnormal; Notable for the following components:    Protime 13.4 (*)     All other components within normal limits   TROPONIN - Abnormal; Notable for the following components:    Troponin, High Sensitivity 69 (*)     All other components within normal limits   POCT GLUCOSE - Abnormal; Notable for the following components:    Meter Glucose 124 (*)     All other components within normal limits   POCT GLUCOSE - Normal   CULTURE, BLOOD 1   CULTURE, BLOOD 2   MICROSCOPIC URINALYSIS     CT Head WO Contrast   Final Result   1.   Slightly limited exam, negative for acute intracranial process, within   the limits of this non-contrast CT exam.      2.  Overall grossly stable sequela of atherosclerotic arterial and chronic   microvascular ischemic disease, as well as age-appropriate, generalized   parenchymal volume loss. 3.  Paranasal sinus, right mastoid air cell complex, and bilateral external   auditory canal disease, as described. .      RECOMMENDATIONS:   1. If hyperacute/acute infarction is clinically suspected, and/or if   unexplained symptoms persist, consider CT-Perfusion/CT angiography vs MRI of   the brain, without and with contrast, for further evaluation, as directed by   the neurologic exam.      .         CT CERVICAL SPINE WO CONTRAST   Final Result   1. There is no acute compression fracture or subluxation of the cervical   spine. 2. Advanced multilevel degenerative disc and degenerative joint disease. XR CHEST PORTABLE   Final Result   1. Possible infiltrate seen along the inferior border of the cardiac pacer. Visualization is limited due to the overlying cardiac pacer. PA and lateral   views or CT of the chest is recommended for further evaluation. MDM  Number of Diagnoses or Management Options  Fall from ground level  Slurred speech  Diagnosis management comments: Patient is an 79 male presents today for her slurred speech. On arrival he is alert to person, moving all extremities to command, no weakness in the upper or lower extremities. He has mildly slurred speech, however is intact. UA shows no evidence urine infection, creatinine at baseline. Labs were otherwise within normal limits. CT head shows no acute abnormalities, no active hemorrhage. Chest x-ray and cervical spine CT are within normal limits as well. Patient is DNR. With patient being at baseline per EMS and neurovascular intact with normal work-up in the department will be discharged back to nursing home.        Amount and/or Complexity of Data Reviewed  Clinical lab tests: reviewed  Tests in the radiology section of CPT®: reviewed                --------------------------------------------- PAST HISTORY ---------------------------------------------  Past Medical History:  has a past medical history of Acute myocardial infarction, unspecified site, Alzheimer's disease ([facility] Anemia associated with acute blood loss, BPH associated with nocturia, CAD (coronary artery disease), native coronary artery, DVT, lower extremity, distal ([facility] [address] HTN, goal below 130/80, Hx of blood clots, Hyperlipidemia LDL goal < 70, Pulmonary emboli ([facility] [address] Retroperitoneal hematoma, and Shingles. Past Surgical History:  has a past surgical history that includes hernia repair; back surgery; Coronary artery bypass graft; Cardiac surgery; hernia repair (1980); Endoscopy, colon, diagnostic (2006); Colonoscopy (2010); ECHO Compl W Dop Color Flow (4/8/2013); ECHO Compl W Dop Color Flow (4/15/2013); ECHO Compl W Dop Color Flow (12/6/2013); Upper gastrointestinal endoscopy (12/6/13); Pacemaker insertion; Cardiac defibrillator placement; and pacemaker placement. Social History:  reports that he quit smoking about 41 years ago. His smoking use included cigarettes. He started smoking about 61 years ago. He has a 20.00 pack-year smoking history. He has never used smokeless tobacco. He reports that he does not drink alcohol and does not use drugs. Family History: family history is not on file. The patients home medications have been reviewed.     Allergies: Heparin, Tape [adhesive tape], and Tape [adhesive tape]    -------------------------------------------------- RESULTS -------------------------------------------------  Labs:  Results for orders placed or performed during the hospital encounter of 09/30/21   CBC Auto Differential   Result Value Ref Range    WBC 10.4 4.5 - 11.5 E9/L    RBC 4.45 3.80 - 5.80 E12/L    Hemoglobin 14.0 12.5 - 16.5 g/dL    Hematocrit 44.1 37.0 - 54.0 %    MCV 99.1 80.0 - 99.9 fL    MCH 31.5 26.0 - 35.0 pg    MCHC 31.7 (L) 32.0 - 34.5 %    RDW 15.0 11.5 - 15.0 fL    Platelets 866 373 - 455 E9/L    MPV 9.7 7.0 - 12.0 fL    Neutrophils % 92.3 (H) 43.0 - 80.0 %    Lymphocytes % 4.3 (L) 20.0 - 42.0 %    Monocytes % 3.4 2.0 - 12.0 %    Eosinophils % 0.2 0.0 - 6.0 %    Basophils % 0.5 0.0 - 2.0 %    Neutrophils Absolute 9.57 (H) 1.80 - 7.30 E9/L    Lymphocytes Absolute 0.42 (L) 1.50 - 4.00 E9/L    Monocytes Absolute 0.31 0.10 - 0.95 E9/L    Eosinophils Absolute 0.00 (L) 0.05 - 0.50 E9/L    Basophils Absolute 0.00 0.00 - 0.20 E9/L    Anisocytosis 1+     Poikilocytes 2+    Comprehensive Metabolic Panel w/ Reflex to MG   Result Value Ref Range    Sodium 143 132 - 146 mmol/L    Potassium reflex Magnesium 4.5 3.5 - 5.0 mmol/L    Chloride 107 98 - 107 mmol/L    CO2 26 22 - 29 mmol/L    Anion Gap 10 7 - 16 mmol/L    Glucose 143 (H) 74 - 99 mg/dL    BUN 19 6 - 23 mg/dL    CREATININE 1.2 0.7 - 1.2 mg/dL    GFR Non-African American 58 >=60 mL/min/1.73    GFR African American >60     Calcium 9.4 8.6 - 10.2 mg/dL    Total Protein 5.9 (L) 6.4 - 8.3 g/dL    Albumin 3.8 3.5 - 5.2 g/dL    Total Bilirubin 0.9 0.0 - 1.2 mg/dL    Alkaline Phosphatase 106 40 - 129 U/L    ALT 13 0 - 40 U/L    AST 13 0 - 39 U/L   Troponin   Result Value Ref Range    Troponin, High Sensitivity 85 (H) 0 - 11 ng/L   Urinalysis, reflex to microscopic   Result Value Ref Range    Color, UA IRINA (A) Straw/Yellow    Clarity, UA CLOUDY (A) Clear    Glucose, Ur Negative Negative mg/dL    Bilirubin Urine Negative Negative    Ketones, Urine TRACE (A) Negative mg/dL    Specific Gravity, UA >=1.030 1.005 - 1.030    Blood, Urine LARGE (A) Negative    pH, UA 6.0 5.0 - 9.0    Protein, UA 30 (A) Negative mg/dL    Urobilinogen, Urine 1.0 <2.0 E.U./dL    Nitrite, Urine Negative Negative    Leukocyte Esterase, Urine Negative Negative   Lactic Acid, Plasma   Result Value Ref Range    Lactic Acid 4.4 (HH) 0.5 - 2.2 mmol/L Protime-INR   Result Value Ref Range    Protime 13.4 (H) 9.3 - 12.4 sec    INR 1.2    Troponin   Result Value Ref Range    Troponin, High Sensitivity 69 (H) 0 - 11 ng/L   Microscopic Urinalysis   Result Value Ref Range    WBC, UA NONE 0 - 5 /HPF    RBC, UA >20 0 - 2 /HPF    Bacteria, UA NONE SEEN None Seen /HPF   POCT Glucose   Result Value Ref Range    Glucose 124 mg/dL   POCT Glucose   Result Value Ref Range    Meter Glucose 124 (H) 74 - 99 mg/dL       Radiology:  CT Head WO Contrast   Final Result   1. Slightly limited exam, negative for acute intracranial process, within   the limits of this non-contrast CT exam.      2.  Overall grossly stable sequela of atherosclerotic arterial and chronic   microvascular ischemic disease, as well as age-appropriate, generalized   parenchymal volume loss. 3.  Paranasal sinus, right mastoid air cell complex, and bilateral external   auditory canal disease, as described. .      RECOMMENDATIONS:   1. If hyperacute/acute infarction is clinically suspected, and/or if   unexplained symptoms persist, consider CT-Perfusion/CT angiography vs MRI of   the brain, without and with contrast, for further evaluation, as directed by   the neurologic exam.      .         CT CERVICAL SPINE WO CONTRAST   Final Result   1. There is no acute compression fracture or subluxation of the cervical   spine. 2. Advanced multilevel degenerative disc and degenerative joint disease. XR CHEST PORTABLE   Final Result   1. Possible infiltrate seen along the inferior border of the cardiac pacer. Visualization is limited due to the overlying cardiac pacer.   PA and lateral   views or CT of the chest is recommended for further evaluation.             ------------------------- NURSING NOTES AND VITALS REVIEWED ---------------------------  Date / Time Roomed:  9/30/2021 12:40 PM  ED Bed Assignment:  13/13    The nursing notes within the ED encounter and vital signs as below have been reviewed. BP 92/66   Pulse 88   Temp 98.2 °F (36.8 °C)   Resp 20   Ht 5' 8\" (1.727 m)   Wt 163 lb (73.9 kg)   SpO2 97%   BMI 24.78 kg/m²   Oxygen Saturation Interpretation: Normal      ------------------------------------------ PROGRESS NOTES ------------------------------------------  I have spoken with the patient and discussed todays results, in addition to providing specific details for the plan of care and counseling regarding the diagnosis and prognosis. Their questions are answered at this time and they are agreeable with the plan. I discussed at length with them reasons for immediate return here for re evaluation. They will followup with primary care by calling their office tomorrow. --------------------------------- ADDITIONAL PROVIDER NOTES ---------------------------------  At this time the patient is without objective evidence of an acute process requiring hospitalization or inpatient management. They have remained hemodynamically stable throughout their entire ED visit and are stable for discharge with outpatient follow-up. The plan has been discussed in detail and they are aware of the specific conditions for emergent return, as well as the importance of follow-up. Discharge Medication List as of 9/30/2021  5:03 PM          Diagnosis:  1. Slurred speech    2. Fall from ground level        Disposition:  Patient's disposition: Discharge to home  Patient's condition is stable.             Edgar Villa,   Resident  09/30/21 [id number]

## 2021-09-30 NOTE — ED NOTES
Physician ambulance here to  pt to take back to [facility], this nurse called nurse to nurse spoke with Clarence Chin.      Bjorn Ellington LPN  53/11/52 [id number]

## 2021-10-04 ENCOUNTER — TELEPHONE (OUTPATIENT)
Dept: PRIMARY CARE CLINIC | Age: 81
End: 2021-10-04

## 2021-10-05 LAB
BLOOD CULTURE, ROUTINE: NORMAL
CULTURE, BLOOD 2: NORMAL

## 2022-12-02 NOTE — PROGRESS NOTES
Blood work, as ordered, was drawn during [address] sent to lab. Dr Morgan Cheng notified that no urine was obtained. Pt has been notified and stated she has been very fatigue and asked if there is anything that can help with this. And asked if it can pertain to the thyroid issue. Also asked if there was anything that can be prescribed to help with pain until seen by pain management in January.

## 2024-12-07 NOTE — PROGRESS NOTES
Called and spoke to pt wife told her to have pt hold the warfarin until Sunday and then start up at 2.5 mg daily and to recheck in two weeks.  Scheduled appt for 3/30/21 to recheck pt inr Improved